# Patient Record
Sex: MALE | Race: WHITE | Employment: FULL TIME | URBAN - METROPOLITAN AREA
[De-identification: names, ages, dates, MRNs, and addresses within clinical notes are randomized per-mention and may not be internally consistent; named-entity substitution may affect disease eponyms.]

---

## 2017-03-20 ENCOUNTER — HOSPITAL ENCOUNTER (OUTPATIENT)
Dept: CT IMAGING | Age: 72
Discharge: HOME | End: 2017-03-20
Attending: UROLOGY
Payer: COMMERCIAL

## 2017-03-20 DIAGNOSIS — C67.9 BLADDER CARCINOMA (HCC): ICD-10-CM

## 2017-03-20 LAB — CREAT BLD-MCNC: 1 MG/DL (ref 0.6–1.3)

## 2017-03-20 PROCEDURE — 74178 CT ABD&PLV WO CNTR FLWD CNTR: CPT

## 2017-03-20 PROCEDURE — 82565 ASSAY OF CREATININE: CPT

## 2017-03-20 PROCEDURE — 74011250636 HC RX REV CODE- 250/636: Performed by: UROLOGY

## 2017-03-20 PROCEDURE — 74011636320 HC RX REV CODE- 636/320: Performed by: UROLOGY

## 2017-03-20 RX ORDER — SODIUM CHLORIDE 0.9 % (FLUSH) 0.9 %
10 SYRINGE (ML) INJECTION
Status: COMPLETED | OUTPATIENT
Start: 2017-03-20 | End: 2017-03-20

## 2017-03-20 RX ORDER — SODIUM CHLORIDE 9 MG/ML
50 INJECTION, SOLUTION INTRAVENOUS
Status: COMPLETED | OUTPATIENT
Start: 2017-03-20 | End: 2017-03-20

## 2017-03-20 RX ADMIN — SODIUM CHLORIDE 50 ML/HR: 900 INJECTION, SOLUTION INTRAVENOUS at 16:41

## 2017-03-20 RX ADMIN — Medication 10 ML: at 16:41

## 2017-03-20 RX ADMIN — IOPAMIDOL 100 ML: 612 INJECTION, SOLUTION INTRAVENOUS at 16:41

## 2017-05-10 ENCOUNTER — HOSPITAL ENCOUNTER (OUTPATIENT)
Dept: GENERAL RADIOLOGY | Age: 72
Discharge: HOME | End: 2017-05-10
Attending: UROLOGY
Payer: COMMERCIAL

## 2017-05-10 ENCOUNTER — HOSPITAL ENCOUNTER (OUTPATIENT)
Dept: PREADMISSION TESTING | Age: 72
Discharge: HOME | End: 2017-05-10
Payer: COMMERCIAL

## 2017-05-10 VITALS
SYSTOLIC BLOOD PRESSURE: 131 MMHG | WEIGHT: 200 LBS | DIASTOLIC BLOOD PRESSURE: 73 MMHG | HEIGHT: 68 IN | TEMPERATURE: 98 F | BODY MASS INDEX: 30.31 KG/M2 | HEART RATE: 59 BPM | OXYGEN SATURATION: 98 %

## 2017-05-10 LAB
ALBUMIN SERPL BCP-MCNC: 3.7 G/DL (ref 3.5–5)
ALBUMIN/GLOB SERPL: 1.1 {RATIO} (ref 1.1–2.2)
ALP SERPL-CCNC: 84 U/L (ref 45–117)
ALT SERPL-CCNC: 24 U/L (ref 12–78)
ANION GAP BLD CALC-SCNC: 5 MMOL/L (ref 5–15)
APPEARANCE UR: CLEAR
APTT PPP: 25.5 SEC (ref 22.1–32.5)
AST SERPL W P-5'-P-CCNC: 18 U/L (ref 15–37)
ATRIAL RATE: 63 BPM
BACTERIA URNS QL MICRO: NEGATIVE /HPF
BASOPHILS # BLD AUTO: 0 K/UL (ref 0–0.1)
BASOPHILS # BLD: 1 % (ref 0–1)
BILIRUB SERPL-MCNC: 0.6 MG/DL (ref 0.2–1)
BILIRUB UR QL: NEGATIVE
BUN SERPL-MCNC: 12 MG/DL (ref 6–20)
BUN/CREAT SERPL: 11 (ref 12–20)
CALCIUM SERPL-MCNC: 9 MG/DL (ref 8.5–10.1)
CALCULATED P AXIS, ECG09: 76 DEGREES
CALCULATED R AXIS, ECG10: 10 DEGREES
CALCULATED T AXIS, ECG11: 50 DEGREES
CHLORIDE SERPL-SCNC: 106 MMOL/L (ref 97–108)
CO2 SERPL-SCNC: 29 MMOL/L (ref 21–32)
COLOR UR: ABNORMAL
CREAT SERPL-MCNC: 1.07 MG/DL (ref 0.7–1.3)
DIAGNOSIS, 93000: NORMAL
EOSINOPHIL # BLD: 0.1 K/UL (ref 0–0.4)
EOSINOPHIL NFR BLD: 1 % (ref 0–7)
EPITH CASTS URNS QL MICRO: ABNORMAL /LPF
ERYTHROCYTE [DISTWIDTH] IN BLOOD BY AUTOMATED COUNT: 14.1 % (ref 11.5–14.5)
GLOBULIN SER CALC-MCNC: 3.4 G/DL (ref 2–4)
GLUCOSE SERPL-MCNC: 100 MG/DL (ref 65–100)
GLUCOSE UR STRIP.AUTO-MCNC: NEGATIVE MG/DL
HCT VFR BLD AUTO: 42.7 % (ref 36.6–50.3)
HGB BLD-MCNC: 14 G/DL (ref 12.1–17)
HGB UR QL STRIP: ABNORMAL
INR PPP: 1 (ref 0.9–1.1)
KETONES UR QL STRIP.AUTO: NEGATIVE MG/DL
LEUKOCYTE ESTERASE UR QL STRIP.AUTO: ABNORMAL
LYMPHOCYTES # BLD AUTO: 25 % (ref 12–49)
LYMPHOCYTES # BLD: 1.1 K/UL (ref 0.8–3.5)
MCH RBC QN AUTO: 28.7 PG (ref 26–34)
MCHC RBC AUTO-ENTMCNC: 32.8 G/DL (ref 30–36.5)
MCV RBC AUTO: 87.5 FL (ref 80–99)
MONOCYTES # BLD: 0.4 K/UL (ref 0–1)
MONOCYTES NFR BLD AUTO: 10 % (ref 5–13)
NEUTS SEG # BLD: 2.7 K/UL (ref 1.8–8)
NEUTS SEG NFR BLD AUTO: 63 % (ref 32–75)
NITRITE UR QL STRIP.AUTO: NEGATIVE
P-R INTERVAL, ECG05: 164 MS
PH UR STRIP: 6 [PH] (ref 5–8)
PLATELET # BLD AUTO: 196 K/UL (ref 150–400)
POTASSIUM SERPL-SCNC: 4.5 MMOL/L (ref 3.5–5.1)
PROT SERPL-MCNC: 7.1 G/DL (ref 6.4–8.2)
PROT UR STRIP-MCNC: NEGATIVE MG/DL
PROTHROMBIN TIME: 10.4 SEC (ref 9–11.1)
Q-T INTERVAL, ECG07: 412 MS
QRS DURATION, ECG06: 78 MS
QTC CALCULATION (BEZET), ECG08: 421 MS
RBC # BLD AUTO: 4.88 M/UL (ref 4.1–5.7)
RBC #/AREA URNS HPF: ABNORMAL /HPF (ref 0–5)
SODIUM SERPL-SCNC: 140 MMOL/L (ref 136–145)
SP GR UR REFRACTOMETRY: 1.02 (ref 1–1.03)
THERAPEUTIC RANGE,PTTT: NORMAL SECS (ref 58–77)
UROBILINOGEN UR QL STRIP.AUTO: 0.2 EU/DL (ref 0.2–1)
VENTRICULAR RATE, ECG03: 63 BPM
WBC # BLD AUTO: 4.2 K/UL (ref 4.1–11.1)
WBC URNS QL MICRO: ABNORMAL /HPF (ref 0–4)

## 2017-05-10 PROCEDURE — 71020 XR CHEST PA LAT: CPT

## 2017-05-10 PROCEDURE — 85610 PROTHROMBIN TIME: CPT | Performed by: UROLOGY

## 2017-05-10 PROCEDURE — 87086 URINE CULTURE/COLONY COUNT: CPT | Performed by: UROLOGY

## 2017-05-10 PROCEDURE — 81001 URINALYSIS AUTO W/SCOPE: CPT | Performed by: UROLOGY

## 2017-05-10 PROCEDURE — 85025 COMPLETE CBC W/AUTO DIFF WBC: CPT | Performed by: UROLOGY

## 2017-05-10 PROCEDURE — 93005 ELECTROCARDIOGRAM TRACING: CPT

## 2017-05-10 PROCEDURE — 36415 COLL VENOUS BLD VENIPUNCTURE: CPT | Performed by: UROLOGY

## 2017-05-10 PROCEDURE — 80053 COMPREHEN METABOLIC PANEL: CPT | Performed by: UROLOGY

## 2017-05-10 PROCEDURE — 85730 THROMBOPLASTIN TIME PARTIAL: CPT | Performed by: UROLOGY

## 2017-05-10 RX ORDER — TIMOLOL MALEATE 6.8 MG/ML
1 SOLUTION/ DROPS OPHTHALMIC DAILY
COMMUNITY

## 2017-05-11 LAB
BACTERIA SPEC CULT: NORMAL
CC UR VC: NORMAL
SERVICE CMNT-IMP: NORMAL

## 2017-05-20 ENCOUNTER — ANESTHESIA EVENT (OUTPATIENT)
Dept: SURGERY | Age: 72
End: 2017-05-20
Payer: COMMERCIAL

## 2017-05-22 ENCOUNTER — HOSPITAL ENCOUNTER (OUTPATIENT)
Age: 72
Setting detail: OBSERVATION
Discharge: HOME | End: 2017-05-23
Attending: UROLOGY | Admitting: UROLOGY
Payer: COMMERCIAL

## 2017-05-22 ENCOUNTER — ANESTHESIA (OUTPATIENT)
Dept: SURGERY | Age: 72
End: 2017-05-22
Payer: COMMERCIAL

## 2017-05-22 PROCEDURE — 74011000258 HC RX REV CODE- 258: Performed by: UROLOGY

## 2017-05-22 PROCEDURE — 99218 HC RM OBSERVATION: CPT

## 2017-05-22 PROCEDURE — 88305 TISSUE EXAM BY PATHOLOGIST: CPT | Performed by: UROLOGY

## 2017-05-22 PROCEDURE — 77030011640 HC PAD GRND REM COVD -A: Performed by: UROLOGY

## 2017-05-22 PROCEDURE — 74011250637 HC RX REV CODE- 250/637: Performed by: UROLOGY

## 2017-05-22 PROCEDURE — 74011000250 HC RX REV CODE- 250

## 2017-05-22 PROCEDURE — 77030008684 HC TU ET CUF COVD -B: Performed by: ANESTHESIOLOGY

## 2017-05-22 PROCEDURE — 77030013079 HC BLNKT BAIR HGGR 3M -A: Performed by: ANESTHESIOLOGY

## 2017-05-22 PROCEDURE — 77030012893

## 2017-05-22 PROCEDURE — 76010000149 HC OR TIME 1 TO 1.5 HR: Performed by: UROLOGY

## 2017-05-22 PROCEDURE — 77030018846 HC SOL IRR STRL H20 ICUM -A: Performed by: UROLOGY

## 2017-05-22 PROCEDURE — 77030018830 HC SOL IRR GLYC ICUM-A: Performed by: UROLOGY

## 2017-05-22 PROCEDURE — 74011250636 HC RX REV CODE- 250/636

## 2017-05-22 PROCEDURE — 77030032490 HC SLV COMPR SCD KNE COVD -B: Performed by: UROLOGY

## 2017-05-22 PROCEDURE — 76060000033 HC ANESTHESIA 1 TO 1.5 HR: Performed by: UROLOGY

## 2017-05-22 PROCEDURE — 77030021707 HC SET IRR FLD WRMR 3M -B: Performed by: UROLOGY

## 2017-05-22 PROCEDURE — 77030026438 HC STYL ET INTUB CARD -A: Performed by: ANESTHESIOLOGY

## 2017-05-22 PROCEDURE — 77030011274 HC ELECTRD CUT LP RWOL -F: Performed by: UROLOGY

## 2017-05-22 PROCEDURE — 76210000016 HC OR PH I REC 1 TO 1.5 HR: Performed by: UROLOGY

## 2017-05-22 RX ORDER — LATANOPROST 50 UG/ML
1 SOLUTION/ DROPS OPHTHALMIC
Status: DISCONTINUED | OUTPATIENT
Start: 2017-05-22 | End: 2017-05-23 | Stop reason: HOSPADM

## 2017-05-22 RX ORDER — MIDAZOLAM HYDROCHLORIDE 1 MG/ML
1 INJECTION, SOLUTION INTRAMUSCULAR; INTRAVENOUS AS NEEDED
Status: DISCONTINUED | OUTPATIENT
Start: 2017-05-22 | End: 2017-05-22 | Stop reason: HOSPADM

## 2017-05-22 RX ORDER — SODIUM CHLORIDE 9 MG/ML
25 INJECTION, SOLUTION INTRAVENOUS CONTINUOUS
Status: DISCONTINUED | OUTPATIENT
Start: 2017-05-22 | End: 2017-05-22 | Stop reason: HOSPADM

## 2017-05-22 RX ORDER — ROPIVACAINE HYDROCHLORIDE 5 MG/ML
30 INJECTION, SOLUTION EPIDURAL; INFILTRATION; PERINEURAL AS NEEDED
Status: DISCONTINUED | OUTPATIENT
Start: 2017-05-22 | End: 2017-05-22 | Stop reason: HOSPADM

## 2017-05-22 RX ORDER — FENTANYL CITRATE 50 UG/ML
25 INJECTION, SOLUTION INTRAMUSCULAR; INTRAVENOUS
Status: DISCONTINUED | OUTPATIENT
Start: 2017-05-22 | End: 2017-05-22 | Stop reason: HOSPADM

## 2017-05-22 RX ORDER — DIPHENHYDRAMINE HYDROCHLORIDE 50 MG/ML
12.5 INJECTION, SOLUTION INTRAMUSCULAR; INTRAVENOUS AS NEEDED
Status: DISCONTINUED | OUTPATIENT
Start: 2017-05-22 | End: 2017-05-22 | Stop reason: SDUPTHER

## 2017-05-22 RX ORDER — SODIUM CHLORIDE, SODIUM LACTATE, POTASSIUM CHLORIDE, CALCIUM CHLORIDE 600; 310; 30; 20 MG/100ML; MG/100ML; MG/100ML; MG/100ML
100 INJECTION, SOLUTION INTRAVENOUS CONTINUOUS
Status: DISCONTINUED | OUTPATIENT
Start: 2017-05-22 | End: 2017-05-22 | Stop reason: HOSPADM

## 2017-05-22 RX ORDER — ONDANSETRON 2 MG/ML
INJECTION INTRAMUSCULAR; INTRAVENOUS AS NEEDED
Status: DISCONTINUED | OUTPATIENT
Start: 2017-05-22 | End: 2017-05-22 | Stop reason: HOSPADM

## 2017-05-22 RX ORDER — SODIUM CHLORIDE 0.9 % (FLUSH) 0.9 %
5-10 SYRINGE (ML) INJECTION EVERY 8 HOURS
Status: DISCONTINUED | OUTPATIENT
Start: 2017-05-22 | End: 2017-05-23 | Stop reason: HOSPADM

## 2017-05-22 RX ORDER — HYDROCODONE BITARTRATE AND ACETAMINOPHEN 5; 325 MG/1; MG/1
1 TABLET ORAL
Status: DISCONTINUED | OUTPATIENT
Start: 2017-05-22 | End: 2017-05-23 | Stop reason: HOSPADM

## 2017-05-22 RX ORDER — SODIUM CHLORIDE, SODIUM LACTATE, POTASSIUM CHLORIDE, CALCIUM CHLORIDE 600; 310; 30; 20 MG/100ML; MG/100ML; MG/100ML; MG/100ML
INJECTION, SOLUTION INTRAVENOUS
Status: DISCONTINUED | OUTPATIENT
Start: 2017-05-22 | End: 2017-05-22 | Stop reason: HOSPADM

## 2017-05-22 RX ORDER — ONDANSETRON 2 MG/ML
4 INJECTION INTRAMUSCULAR; INTRAVENOUS AS NEEDED
Status: DISCONTINUED | OUTPATIENT
Start: 2017-05-22 | End: 2017-05-22 | Stop reason: SDUPTHER

## 2017-05-22 RX ORDER — FENTANYL CITRATE 50 UG/ML
INJECTION, SOLUTION INTRAMUSCULAR; INTRAVENOUS AS NEEDED
Status: DISCONTINUED | OUTPATIENT
Start: 2017-05-22 | End: 2017-05-22 | Stop reason: HOSPADM

## 2017-05-22 RX ORDER — BACITRACIN ZINC 500 UNIT/G
OINTMENT (GRAM) TOPICAL 3 TIMES DAILY
Status: DISCONTINUED | OUTPATIENT
Start: 2017-05-22 | End: 2017-05-23 | Stop reason: HOSPADM

## 2017-05-22 RX ORDER — SODIUM CHLORIDE 0.9 % (FLUSH) 0.9 %
5-10 SYRINGE (ML) INJECTION AS NEEDED
Status: DISCONTINUED | OUTPATIENT
Start: 2017-05-22 | End: 2017-05-22 | Stop reason: HOSPADM

## 2017-05-22 RX ORDER — DIPHENHYDRAMINE HYDROCHLORIDE 50 MG/ML
12.5 INJECTION, SOLUTION INTRAMUSCULAR; INTRAVENOUS AS NEEDED
Status: DISCONTINUED | OUTPATIENT
Start: 2017-05-22 | End: 2017-05-22 | Stop reason: HOSPADM

## 2017-05-22 RX ORDER — SODIUM CHLORIDE 0.9 % (FLUSH) 0.9 %
5-10 SYRINGE (ML) INJECTION AS NEEDED
Status: DISCONTINUED | OUTPATIENT
Start: 2017-05-22 | End: 2017-05-23 | Stop reason: HOSPADM

## 2017-05-22 RX ORDER — HYDROMORPHONE HYDROCHLORIDE 1 MG/ML
0.5 INJECTION, SOLUTION INTRAMUSCULAR; INTRAVENOUS; SUBCUTANEOUS
Status: DISCONTINUED | OUTPATIENT
Start: 2017-05-22 | End: 2017-05-22 | Stop reason: HOSPADM

## 2017-05-22 RX ORDER — NALOXONE HYDROCHLORIDE 0.4 MG/ML
0.4 INJECTION, SOLUTION INTRAMUSCULAR; INTRAVENOUS; SUBCUTANEOUS AS NEEDED
Status: DISCONTINUED | OUTPATIENT
Start: 2017-05-22 | End: 2017-05-23 | Stop reason: HOSPADM

## 2017-05-22 RX ORDER — LIDOCAINE HYDROCHLORIDE 20 MG/ML
INJECTION, SOLUTION EPIDURAL; INFILTRATION; INTRACAUDAL; PERINEURAL AS NEEDED
Status: DISCONTINUED | OUTPATIENT
Start: 2017-05-22 | End: 2017-05-22 | Stop reason: HOSPADM

## 2017-05-22 RX ORDER — SODIUM CHLORIDE 0.9 % (FLUSH) 0.9 %
5-10 SYRINGE (ML) INJECTION AS NEEDED
Status: DISCONTINUED | OUTPATIENT
Start: 2017-05-22 | End: 2017-05-22 | Stop reason: SDUPTHER

## 2017-05-22 RX ORDER — CEFAZOLIN SODIUM 1 G/3ML
INJECTION, POWDER, FOR SOLUTION INTRAMUSCULAR; INTRAVENOUS AS NEEDED
Status: DISCONTINUED | OUTPATIENT
Start: 2017-05-22 | End: 2017-05-22 | Stop reason: HOSPADM

## 2017-05-22 RX ORDER — FENTANYL CITRATE 50 UG/ML
50 INJECTION, SOLUTION INTRAMUSCULAR; INTRAVENOUS AS NEEDED
Status: DISCONTINUED | OUTPATIENT
Start: 2017-05-22 | End: 2017-05-22 | Stop reason: HOSPADM

## 2017-05-22 RX ORDER — LIDOCAINE HYDROCHLORIDE 10 MG/ML
0.1 INJECTION, SOLUTION EPIDURAL; INFILTRATION; INTRACAUDAL; PERINEURAL AS NEEDED
Status: DISCONTINUED | OUTPATIENT
Start: 2017-05-22 | End: 2017-05-22 | Stop reason: HOSPADM

## 2017-05-22 RX ORDER — SODIUM CHLORIDE, SODIUM LACTATE, POTASSIUM CHLORIDE, CALCIUM CHLORIDE 600; 310; 30; 20 MG/100ML; MG/100ML; MG/100ML; MG/100ML
100 INJECTION, SOLUTION INTRAVENOUS CONTINUOUS
Status: DISCONTINUED | OUTPATIENT
Start: 2017-05-22 | End: 2017-05-22 | Stop reason: SDUPTHER

## 2017-05-22 RX ORDER — SODIUM CHLORIDE 0.9 % (FLUSH) 0.9 %
5-10 SYRINGE (ML) INJECTION EVERY 8 HOURS
Status: DISCONTINUED | OUTPATIENT
Start: 2017-05-22 | End: 2017-05-22 | Stop reason: HOSPADM

## 2017-05-22 RX ORDER — FENTANYL CITRATE 50 UG/ML
25 INJECTION, SOLUTION INTRAMUSCULAR; INTRAVENOUS
Status: DISCONTINUED | OUTPATIENT
Start: 2017-05-22 | End: 2017-05-22 | Stop reason: SDUPTHER

## 2017-05-22 RX ORDER — OXYBUTYNIN CHLORIDE 5 MG/1
5 TABLET ORAL 3 TIMES DAILY
Status: DISCONTINUED | OUTPATIENT
Start: 2017-05-22 | End: 2017-05-23 | Stop reason: HOSPADM

## 2017-05-22 RX ORDER — MIDAZOLAM HYDROCHLORIDE 1 MG/ML
0.5 INJECTION, SOLUTION INTRAMUSCULAR; INTRAVENOUS
Status: DISCONTINUED | OUTPATIENT
Start: 2017-05-22 | End: 2017-05-22 | Stop reason: HOSPADM

## 2017-05-22 RX ORDER — SUCCINYLCHOLINE CHLORIDE 20 MG/ML
INJECTION INTRAMUSCULAR; INTRAVENOUS AS NEEDED
Status: DISCONTINUED | OUTPATIENT
Start: 2017-05-22 | End: 2017-05-22 | Stop reason: HOSPADM

## 2017-05-22 RX ORDER — DEXTROSE MONOHYDRATE AND SODIUM CHLORIDE 5; .45 G/100ML; G/100ML
75 INJECTION, SOLUTION INTRAVENOUS CONTINUOUS
Status: DISCONTINUED | OUTPATIENT
Start: 2017-05-22 | End: 2017-05-23 | Stop reason: HOSPADM

## 2017-05-22 RX ORDER — MIDAZOLAM HYDROCHLORIDE 1 MG/ML
0.5 INJECTION, SOLUTION INTRAMUSCULAR; INTRAVENOUS
Status: DISCONTINUED | OUTPATIENT
Start: 2017-05-22 | End: 2017-05-22 | Stop reason: SDUPTHER

## 2017-05-22 RX ORDER — TIMOLOL MALEATE 5 MG/ML
1 SOLUTION/ DROPS OPHTHALMIC DAILY
Status: DISCONTINUED | OUTPATIENT
Start: 2017-05-23 | End: 2017-05-23 | Stop reason: HOSPADM

## 2017-05-22 RX ORDER — ROCURONIUM BROMIDE 10 MG/ML
INJECTION, SOLUTION INTRAVENOUS AS NEEDED
Status: DISCONTINUED | OUTPATIENT
Start: 2017-05-22 | End: 2017-05-22 | Stop reason: HOSPADM

## 2017-05-22 RX ORDER — MORPHINE SULFATE 10 MG/ML
2 INJECTION, SOLUTION INTRAMUSCULAR; INTRAVENOUS
Status: DISCONTINUED | OUTPATIENT
Start: 2017-05-22 | End: 2017-05-22 | Stop reason: SDUPTHER

## 2017-05-22 RX ORDER — ONDANSETRON 2 MG/ML
4 INJECTION INTRAMUSCULAR; INTRAVENOUS AS NEEDED
Status: DISCONTINUED | OUTPATIENT
Start: 2017-05-22 | End: 2017-05-22 | Stop reason: HOSPADM

## 2017-05-22 RX ORDER — MORPHINE SULFATE 10 MG/ML
2 INJECTION, SOLUTION INTRAMUSCULAR; INTRAVENOUS
Status: DISCONTINUED | OUTPATIENT
Start: 2017-05-22 | End: 2017-05-22 | Stop reason: HOSPADM

## 2017-05-22 RX ORDER — CEFAZOLIN SODIUM IN 0.9 % NACL 2 G/50 ML
2 INTRAVENOUS SOLUTION, PIGGYBACK (ML) INTRAVENOUS
Status: DISCONTINUED | OUTPATIENT
Start: 2017-05-22 | End: 2017-05-22 | Stop reason: HOSPADM

## 2017-05-22 RX ORDER — PROPOFOL 10 MG/ML
INJECTION, EMULSION INTRAVENOUS AS NEEDED
Status: DISCONTINUED | OUTPATIENT
Start: 2017-05-22 | End: 2017-05-22 | Stop reason: HOSPADM

## 2017-05-22 RX ORDER — HYDROMORPHONE HYDROCHLORIDE 1 MG/ML
0.5 INJECTION, SOLUTION INTRAMUSCULAR; INTRAVENOUS; SUBCUTANEOUS
Status: DISCONTINUED | OUTPATIENT
Start: 2017-05-22 | End: 2017-05-22 | Stop reason: SDUPTHER

## 2017-05-22 RX ADMIN — SUCCINYLCHOLINE CHLORIDE 140 MG: 20 INJECTION INTRAMUSCULAR; INTRAVENOUS at 10:38

## 2017-05-22 RX ADMIN — FENTANYL CITRATE 50 MCG: 50 INJECTION, SOLUTION INTRAMUSCULAR; INTRAVENOUS at 10:22

## 2017-05-22 RX ADMIN — DEXTROSE MONOHYDRATE AND SODIUM CHLORIDE 75 ML/HR: 5; .45 INJECTION, SOLUTION INTRAVENOUS at 19:00

## 2017-05-22 RX ADMIN — PROPOFOL 20 MG: 10 INJECTION, EMULSION INTRAVENOUS at 10:28

## 2017-05-22 RX ADMIN — PROPOFOL 20 MG: 10 INJECTION, EMULSION INTRAVENOUS at 10:22

## 2017-05-22 RX ADMIN — PROPOFOL 150 MG: 10 INJECTION, EMULSION INTRAVENOUS at 10:38

## 2017-05-22 RX ADMIN — OXYBUTYNIN CHLORIDE 5 MG: 5 TABLET ORAL at 21:50

## 2017-05-22 RX ADMIN — Medication: at 21:47

## 2017-05-22 RX ADMIN — CEFAZOLIN SODIUM 2 G: 1 INJECTION, POWDER, FOR SOLUTION INTRAMUSCULAR; INTRAVENOUS at 10:40

## 2017-05-22 RX ADMIN — ONDANSETRON 4 MG: 2 INJECTION INTRAMUSCULAR; INTRAVENOUS at 11:25

## 2017-05-22 RX ADMIN — OXYBUTYNIN CHLORIDE 5 MG: 5 TABLET ORAL at 19:00

## 2017-05-22 RX ADMIN — Medication: at 18:00

## 2017-05-22 RX ADMIN — FENTANYL CITRATE 50 MCG: 50 INJECTION, SOLUTION INTRAMUSCULAR; INTRAVENOUS at 10:38

## 2017-05-22 RX ADMIN — Medication 10 ML: at 21:49

## 2017-05-22 RX ADMIN — LIDOCAINE HYDROCHLORIDE 80 MG: 20 INJECTION, SOLUTION EPIDURAL; INFILTRATION; INTRACAUDAL; PERINEURAL at 10:38

## 2017-05-22 RX ADMIN — SODIUM CHLORIDE, SODIUM LACTATE, POTASSIUM CHLORIDE, CALCIUM CHLORIDE: 600; 310; 30; 20 INJECTION, SOLUTION INTRAVENOUS at 10:00

## 2017-05-22 RX ADMIN — ROCURONIUM BROMIDE 10 MG: 10 INJECTION, SOLUTION INTRAVENOUS at 10:38

## 2017-05-22 NOTE — PERIOP NOTES
Patient: Deannie Meckel. MRN: [id number]  SSN: [id number]   YOB: 1945  Age: 70 y.o. Sex: male     Patient is status post Procedure(s):  Transurethral Resection of Large Bladder Tumor      .     Surgeon(s) and Role:     * Toni Mccracken MD - Primary    Local/Dose/Irrigation: None                                         Dressing/Packing:     Splint/Cast:  ]    Other:

## 2017-05-22 NOTE — IP AVS SNAPSHOT
[address] 
[phone] Patient: Avni Whyte. MRN: [id number] ABI:1/[birth year]15 You are allergic to the following Allergen Reactions Nsaids (Non-Steroidal Anti-Inflammatory Drug) Other (comments) As per physcian order due to glaucoma and CKD Recent Documentation Height Weight BMI Smoking Status 1.727 m 90.7 kg 30.41 kg/m2 Never Smoker Emergency Contacts Name Discharge Info Relation Home Work Mobile RuiMilagros hamlin DISCHARGE CAREGIVER [3] Spouse [3] [phone] About your hospitalization You were admitted on:  May 22, 2017 You last received care in the:  [place] [address] You were discharged on:  May 23, 2017 Unit phone number:  [phone] Why you were hospitalized Your primary diagnosis was:  Not on File Providers Seen During Your Hospitalizations Provider Role Specialty Primary office phone Debora Stout MD Attending Provider Urology [phone] Your Primary Care Physician (PCP) Primary Care Physician Office Phone Office Fax Kermit Donnelly [phone] Follow-up Information Follow up With Details Comments Contact Info Stefan Jovel MD Go on 5/30/2017 For hospital discharge follow-up appointment at 11:15 AM. [address] [place] [address] 
[address] [facility] [address] 
[phone] Your Appointments Tuesday May 30, 2017 11:15 AM EDT ACUTE CARE with Stefan Jovel MD  
[address] [id number] [phone] Current Discharge Medication List  
  
START taking these medications Dose & Instructions Dispensing Information Comments Morning Noon Evening Bedtime  
 cephALEXin 500 mg capsule Commonly known as:  Baldev Haney Your last dose was: Your next dose is:    
   
   
 Dose:  500 mg Take 1 Cap by mouth four (4) times daily for 5 days. Quantity:  20 Cap Refills:  0 HYDROcodone-acetaminophen 7.5-325 mg per tablet Commonly known as:  Madhuri Staple Your last dose was: Your next dose is:    
   
   
 Dose:  1 Tab Take 1 Tab by mouth every six (6) hours as needed for Pain. Max Daily Amount: 4 Tabs. Quantity:  20 Tab Refills:  o CONTINUE these medications which have NOT CHANGED Dose & Instructions Dispensing Information Comments Morning Noon Evening Bedtime ISTALOL 0.5 % Drpd ophthalmic solution Generic drug:  timolol maleate Your last dose was: Your next dose is:    
   
   
 Dose:  1 Drop Administer 1 Drop to right eye daily. Refills:  0 MULTIPLE VITAMIN PO Your last dose was: Your next dose is:    
   
   
 Dose:  1 Tab Take 1 Tab by mouth daily. Refills:  0  
     
   
   
   
  
 simvastatin 20 mg tablet Commonly known as:  ZOCOR Your last dose was: Your next dose is: TAKE 1 TABLET BY MOUTH AT BEDTIME Quantity:  90 Tab Refills:  3 TRAVATAN Z 0.004 % ophthalmic solution Generic drug:  travoprost  
   
Your last dose was: Your next dose is:    
   
   
 Dose:  1 Drop Administer 1 Drop to both eyes every evening. Refills:  0  
     
   
   
   
  
 VITAMIN D3 1,000 unit tablet Generic drug:  cholecalciferol Your last dose was: Your next dose is:    
   
   
 Dose:  2000 Units Take 2,000 Units by mouth daily. Refills:  0 ZyrTEC 10 mg tablet Generic drug:  cetirizine Your last dose was: Your next dose is:    
   
   
 Dose:  10 mg Take 10 mg by mouth daily. Refills:  0 STOP taking these medications aspirin delayed-release 81 mg tablet Where to Get Your Medications Information on where to get these meds will be given to you by the nurse or doctor. ! Ask your nurse or doctor about these medications  
  cephALEXin 500 mg capsule HYDROcodone-acetaminophen 7.5-325 mg per tablet Discharge Instructions Patient Discharge Instructions Michelle Pedro. / [id number] : 1945 Admitted 2017 Discharged: 2017 Take Home Medications · It is important that you take the medication exactly as they are prescribed. · Keep your medication in the bottles provided by the pharmacist and keep a list of the medication names, dosages, and times to be taken in your wallet. · Do not take other medications without consulting your doctor. What to do at [facility] Recommended activity: No Lifting for 6 weeks. Follow-up with [facility]. Call for an appointment (if not already scheduled)            [phone]. Information obtained by : 
I understand that if any problems occur once I am at home I am to contact my physician. I understand and acknowledge receipt of the instructions indicated above. Physician's or R.N.'s Signature                                                                  Date/Time Patient or Representative Signature                                                          Date/Time DISCHARGE SUMMARY from Nurse The following personal items are in your possession at time of discharge: 
 
Dental Appliances: None Visual Aid: Glasses Home Medications: None Jewelry: None Clothing: At bedside Other Valuables: None PATIENT INSTRUCTIONS: 
 
After general anesthesia or intravenous sedation, for 24 hours or while taking prescription Narcotics: · Limit your activities · Do not drive and operate hazardous machinery · Do not make important personal or business decisions · Do  not drink alcoholic beverages · If you have not urinated within 8 hours after discharge, please contact your surgeon on call. Report the following to your surgeon: 
· Excessive pain, swelling, redness or odor of or around the surgical area · Temperature over 100.5 · Nausea and vomiting lasting longer than 4 hours or if unable to take medications · Any signs of decreased circulation or nerve impairment to extremity: change in color, persistent  numbness, tingling, coldness or increase pain · Any questions These are general instructions for a healthy lifestyle: No smoking/ No tobacco products/ Avoid exposure to second hand smoke Surgeon General's Warning:  Quitting smoking now greatly reduces serious risk to your health. Obesity, smoking, and sedentary lifestyle greatly increases your risk for illness A healthy diet, regular physical exercise & weight monitoring are important for maintaining a healthy lifestyle You may be retaining fluid if you have a history of heart failure or if you experience any of the following symptoms:  Weight gain of 3 pounds or more overnight or 5 pounds in a week, increased swelling in our hands or feet or shortness of breath while lying flat in bed. Please call your doctor as soon as you notice any of these symptoms; do not wait until your next office visit. Recognize signs and symptoms of STROKE: 
 
F-face looks uneven A-arms unable to move or move unevenly S-speech slurred or non-existent T-time-call 911 as soon as signs and symptoms begin-DO NOT go Back to bed or wait to see if you get better-TIME IS BRAIN.  
 
Warning Signs of HEART ATTACK  
 
 Call 911 if you have these symptoms: 
? Chest discomfort. Most heart attacks involve discomfort in the center of the chest that lasts more than a few minutes, or that goes away and comes back. It can feel like uncomfortable pressure, squeezing, fullness, or pain. ? Discomfort in other areas of the upper body. Symptoms can include pain or discomfort in one or both arms, the back, neck, jaw, or stomach. ? Shortness of breath with or without chest discomfort. ? Other signs may include breaking out in a cold sweat, nausea, or lightheadedness. Don't wait more than five minutes to call [address]! Fast action can save your life. Calling 911 is almost always the fastest way to get lifesaving treatment. Emergency Medical Services staff can begin treatment when they arrive  up to an hour sooner than if someone gets to the hospital by car. The discharge information has been reviewed with the patient. The patient verbalized understanding. Discharge medications reviewed with the patient and appropriate educational materials and side effects teaching were provided. Discharge Orders None Introducing [facility] SERVICES! [facility] introduces Intrallect patient portal. Now you can access parts of your medical record, email your doctor's office, and request medication refills online. 1. In your internet browser, go to [email or web address] 2. Click on the First Time User? Click Here link in the Sign In box. You will see the New Member Sign Up page. 3. Enter your Intrallect Access Code exactly as it appears below. You will not need to use this code after youve completed the sign-up process. If you do not sign up before the expiration date, you must request a new code. · Intrallect Access Code: [email or web address] Expires: 6/13/2017  3:46 PM 
 
4.  Enter the last four digits of your Social Security Number (xxxx) and Date of Birth (mm/dd/yyyy) as indicated and click Submit. You will be taken to the next sign-up page. 5. Create a [email or web address] ID. This will be your [email or web address] login ID and cannot be changed, so think of one that is secure and easy to remember. 6. Create a [email or web address] password. You can change your password at any time. 7. Enter your Password Reset Question and Answer. This can be used at a later time if you forget your password. 8. Enter your e-mail address. You will receive e-mail notification when new information is available in [address] 9. Click Sign Up. You can now view and download portions of your medical record. 10. Click the Download Summary menu link to download a portable copy of your medical information. If you have questions, please visit the Frequently Asked Questions section of the [email or web address] website. Remember, [email or web address] is NOT to be used for urgent needs. For medical emergencies, dial 911. Now available from your iPhone and Android! General Information Please provide this summary of care documentation to your next provider. Patient Signature:  ____________________________________________________________ Date:  ____________________________________________________________  
  
Nguyễn Rell Provider Signature:  ____________________________________________________________ Date:  ____________________________________________________________

## 2017-05-22 NOTE — PERIOP NOTES
Called to the Surgical Waiting and spoke with the patient's wife to let them know that we had started the procedure at [phone]. Also that we would update them as needed.

## 2017-05-22 NOTE — BRIEF OP NOTE
BRIEF OPERATIVE NOTE    Date of Procedure: 5/22/2017   Preoperative Diagnosis: BLADDER CA  Postoperative Diagnosis: BLADDER CA    Procedure(s):  Transurethral Resection of Large Bladder Tumor        Surgeon(s) and Role:     * Brittney Liu MD - Primary         Assistant Staff:       Surgical Staff:  Circ-1: Pavan Perez, RN  Scrub RN-1: Haydee Segovia RN  Event Time In   Incision Start 1040   Incision Close 1115     Anesthesia: General   Estimated Blood Loss: 100  Specimens:   ID Type Source Tests Collected by Time Destination   1 : Dome Lesion Preservative Bladder  Brittney Liu MD 5/22/2017 1046 Pathology   2 : Resection of [facility] Preservative Bladder  Brittney Liu MD 5/22/2017 1059 Pathology   3 : Left Lateral Wall Preservative Bladder  Brittney Liu MD 5/22/2017 1110 Pathology   4 : Floor of BLadder Preservative Bladder  Brittney Liu MD 5/22/2017 1110 Pathology   5 : Right Floor of Bladder Preservative Bladder  Brittney Liu MD 5/22/2017 1111 Pathology   6 : Right Posterior Wall Preservative Bladder  Brittney Liu MD 5/22/2017 1111 Pathology   7 : Prostatic Urethra @ 5 'o clock Allison Pritchard MD 5/22/2017 1112 Pathology      Findings: multiple tumors bladder.   large   Complications: 0  Implants: * No implants in log *

## 2017-05-22 NOTE — OP NOTES
[address] [facility] [address], [address]   OP NOTE       Name:  Aidee Yap   MR#:  [id number]   :  1945   Account #:  [de-identified]    Surgery Date:  2017   Date of Adm:  2017       PREOPERATIVE DIAGNOSIS: Transitional cell carcinoma of the   bladder. POSTOPERATIVE DIAGNOSIS: Transitional cell carcinoma of the   bladder. PROCEDURES PERFORMED: Repeat Transurethral resection of   large bladder tumor. COMPLICATIONS: None. SPECIMENS REMOVED: Bladder lesion labeled by site, prostatic   urethra. COMPLICATIONS: None. ESTIMATED BLOOD LOSS: 100 mL. ANESTHESIA: General.      DESCRIPTION OF PROCEDURE: After anesthesia, the patient was   prepped and draped in lithotomy. The 21 cystoscope was inserted and   the bladder was carefully examined. There was erythema and   irregularity involving the prostatic urethra. There were reddened   irregular appearing areas in the posterior wall floor, right floor   dome, and left lateral wall. The largest was the lesion in the dome,   which had some papillary configuration, some irregularity, all of these   were associated with some necrosis related to previous transurethral   resection and previous intravesical BCG. Using the rigid cold cup   biopsy forceps, some samples were taken from the dome lesion and   then the resectoscope was inserted and the areas in question were all   resected deeply in to perivesical fat in some areas and muscularis   propria. Hemostasis was obtained with electrocautery. A large sample   of the prostatic urethra was then taken using pure cutting current in the   5 o'clock position and this was associated with a significant amount of   bleeding, which was then controlled using electrocautery. All the   pieces were irrigated and submitted based on their anatomic location. Good quality hemostasis was obtained, and a 24-Nauruan Gamble was   inserted and irrigation which returned clear. The patient was then   reacted from anesthesia and transferred to recovery in stable   condition.         MD Emanuel Mai / Reggie.Osvaldo   D:  05/22/2017   11:38   T:  05/22/2017   12:04   Job #:  [id number]

## 2017-05-22 NOTE — PERIOP NOTES
TRANSFER - OUT REPORT:    Verbal report given to Yuliya(name) on [employer].  being transferred to [facility](unit) for routine post - op       Report consisted of patients Situation, Background, Assessment and   Recommendations(SBAR). Time Pre op antibiotic given:1040  Anesthesia Stop time: 3265  Gamble Present on Transfer to floor:yes  Order for Gamble on Chart:yes    Information from the following report(s) SBAR, Kardex, OR Summary, Procedure Summary, Intake/Output, MAR, Recent Results and Med Rec Status was reviewed with the receiving nurse. Opportunity for questions and clarification was provided. Is the patient on 02?  no       L/Min        Other     Is the patient on a monitor? NO    Is the nurse transporting with the patient? NO    Surgical Waiting Area notified of patient's transfer from PACU? YES      The following personal items collected during your admission accompanied patient upon transfer:   Dental Appliance:    Vision: Visual Aid: Glasses  Hearing Aid:    Jewelry:    Clothing:    Other Valuables:    Valuables sent to safe:        Glasses on patient. Clothes sent to floor.

## 2017-05-22 NOTE — IP AVS SNAPSHOT
Current Discharge Medication List  
  
START taking these medications Dose & Instructions Dispensing Information Comments Morning Noon Evening Bedtime  
 cephALEXin 500 mg capsule Commonly known as:  Salimacorazon Reza Your last dose was: Your next dose is:    
   
   
 Dose:  500 mg Take 1 Cap by mouth four (4) times daily for 5 days. Quantity:  20 Cap Refills:  0 HYDROcodone-acetaminophen 7.5-325 mg per tablet Commonly known as:  Susana Elsa Your last dose was: Your next dose is:    
   
   
 Dose:  1 Tab Take 1 Tab by mouth every six (6) hours as needed for Pain. Max Daily Amount: 4 Tabs. Quantity:  20 Tab Refills:  o CONTINUE these medications which have NOT CHANGED Dose & Instructions Dispensing Information Comments Morning Noon Evening Bedtime ISTALOL 0.5 % Drpd ophthalmic solution Generic drug:  timolol maleate Your last dose was: Your next dose is:    
   
   
 Dose:  1 Drop Administer 1 Drop to right eye daily. Refills:  0 MULTIPLE VITAMIN PO Your last dose was: Your next dose is:    
   
   
 Dose:  1 Tab Take 1 Tab by mouth daily. Refills:  0  
     
   
   
   
  
 simvastatin 20 mg tablet Commonly known as:  ZOCOR Your last dose was: Your next dose is: TAKE 1 TABLET BY MOUTH AT BEDTIME Quantity:  90 Tab Refills:  3 TRAVATAN Z 0.004 % ophthalmic solution Generic drug:  travoprost  
   
Your last dose was: Your next dose is:    
   
   
 Dose:  1 Drop Administer 1 Drop to both eyes every evening. Refills:  0  
     
   
   
   
  
 VITAMIN D3 1,000 unit tablet Generic drug:  cholecalciferol Your last dose was: Your next dose is:    
   
   
 Dose:  2000 Units Take 2,000 Units by mouth daily. Refills:  0 ZyrTEC 10 mg tablet Generic drug:  cetirizine Your last dose was: Your next dose is:    
   
   
 Dose:  10 mg Take 10 mg by mouth daily. Refills:  0 STOP taking these medications   
 aspirin delayed-release 81 mg tablet Where to Get Your Medications Information on where to get these meds will be given to you by the nurse or doctor. ! Ask your nurse or doctor about these medications  
  cephALEXin 500 mg capsule HYDROcodone-acetaminophen 7.5-325 mg per tablet

## 2017-05-22 NOTE — H&P
Urology History and Physical    Patient: Claudell Mohair. 70 y.o. male     Referring Physician:  No ref. provider found    Chief Complaint: No chief complaint on file. History of Present Illness: tcc bladder for repeat tur see office notes    History:    Past Medical History:   Diagnosis Date    Allergic rhinitis 1/30/14    [facility] at [facility]    Arthritis     HANDS    Bladder cancer ([facility] [address] 06/02/2016    Dr. Mica James 8/10/16 f/u note path report too    Chronic kidney disease     Chronic kidney disease, stage II (mild)     Diverticula of colon 7/18/12    mia monterroso    Diverticulosis of colon     Elevated PSA 10/2011    Dr Mica James    Glaucoma     Hypercholesterolemia     near syncope 10/06/15    notes from [place] [address] Other ill-defined conditions     in clinical trial at [place] [address] for prostate cancer    Pneumonia     [facility]2012      6/3/16 [facility] notes  [facility] 8/10/16 note    Prostatitis     Lud[place]    Prostatitis 07/2014    dionteeman    Tinea cruris 3/09    and rosacea    Varicose veins     Vitamin D deficiency      Family History   Problem Relation Age of Onset    Heart Disease Mother     Cancer Mother      lung    Asthma Mother     Diabetes Father     Thyroid Disease Sister     Cancer Sister 61     breast and throat    Stroke Paternal Grandfather     MS Sister     Cancer Sister      THYROID    Anesth Problems Neg Hx      Social History     Social History    Marital status:      Spouse name: N/A    Number of children: N/A    Years of education: N/A     Occupational History    Not on file. Social History Main Topics    Smoking status: Never Smoker    Smokeless tobacco: Never Used    Alcohol use No    Drug use: No    Sexual activity: Yes     Partners: Female     Other Topics Concern    Not on file     Social History Narrative       Allergies:    Allergies   Allergen Reactions    Nsaids (Non-Steroidal Anti-Inflammatory Drug) Other (comments)     As per physcian order due to glaucoma and CKD       Current Medications:  Current Facility-Administered Medications   Medication Dose Route Frequency    ceFAZolin in 0.9% NS (ANCEF) IVPB soln 2 g  2 g IntraVENous ON CALL TO OR    lactated ringers infusion  100 mL/hr IntraVENous CONTINUOUS    0.9% sodium chloride infusion  25 mL/hr IntraVENous CONTINUOUS    sodium chloride (NS) flush 5-10 mL  5-10 mL IntraVENous Q8H    sodium chloride (NS) flush 5-10 mL  5-10 mL IntraVENous PRN    lidocaine (PF) (XYLOCAINE) 10 mg/mL (1 %) injection 0.1 mL  0.1 mL SubCUTAneous PRN    fentaNYL citrate (PF) injection 50 mcg  50 mcg IntraVENous PRN    midazolam (VERSED) injection 1 mg  1 mg IntraVENous PRN    midazolam (VERSED) injection 1 mg  1 mg IntraVENous PRN    ropivacaine (PF) (NAROPIN) 5 mg/mL (0.5 %) injection 150 mg  30 mL Intra artICUlar PRN    lactated ringers infusion  100 mL/hr IntraVENous CONTINUOUS    0.9% sodium chloride infusion  25 mL/hr IntraVENous CONTINUOUS    sodium chloride (NS) flush 5-10 mL  5-10 mL IntraVENous Q8H    sodium chloride (NS) flush 5-10 mL  5-10 mL IntraVENous PRN    lidocaine (PF) (XYLOCAINE) 10 mg/mL (1 %) injection 0.1 mL  0.1 mL SubCUTAneous PRN    fentaNYL citrate (PF) injection 50 mcg  50 mcg IntraVENous PRN    midazolam (VERSED) injection 1 mg  1 mg IntraVENous PRN    midazolam (VERSED) injection 1 mg  1 mg IntraVENous PRN    ropivacaine (PF) (NAROPIN) 5 mg/mL (0.5 %) injection 150 mg  30 mL Intra artICUlar PRN        Physical Exam:  Blood pressure 135/67, pulse 64, temperature 98.3 °F (36.8 °C), resp. rate 16, height 5' 8\" (1.727 m), weight 90.7 kg (200 lb), SpO2 (!) 64 %. GENERAL: alert, cooperative, no distress, appears stated age, LUNG: clear to auscultation bilaterally, HEART: regular rate and rhythm, S1, S2 normal, no murmur, click, rub or gallop, ABDOMEN: soft, non-tender.  Bowel sounds normal. No masses,  no organomegaly, EXTREMITIES:  extremities normal, atraumatic, no cyanosis or edema    Findings/Diagnosis: tcc bladder    Alerts:    Hospital Problems  Date Reviewed: 8/26/2016    None          Laboratory:    No results for input(s): HGB, HCT, WBC, PLT, INR, BUN, CREA, K, CRCLT, HGBEXT, HCTEXT, PLTEXT in the last 72 hours. No lab exists for component: PTT, PT, INREXT      Plan of Care/Planned Procedure:  Risks, benefits, and alternatives reviewed with patient and he agrees to proceed with the procedure. Full dictated report to follow.

## 2017-05-22 NOTE — ANESTHESIA PREPROCEDURE EVALUATION
Anesthetic History   No history of anesthetic complications            Review of Systems / Medical History  Patient summary reviewed, nursing notes reviewed and pertinent labs reviewed    Pulmonary  Within defined limits                 Neuro/Psych   Within defined limits           Cardiovascular  Within defined limits                Exercise tolerance: >4 METS     GI/Hepatic/Renal  Within defined limits       Renal disease: CRI       Endo/Other  Within defined limits      Arthritis and cancer     Other Findings              Physical Exam    Airway  Mallampati: II  TM Distance: 4 - 6 cm  Neck ROM: normal range of motion   Mouth opening: Normal     Cardiovascular  Regular rate and rhythm,  S1 and S2 normal,  no murmur, click, rub, or gallop             Dental  No notable dental hx       Pulmonary  Breath sounds clear to auscultation               Abdominal  GI exam deferred       Other Findings            Anesthetic Plan    ASA: 2  Anesthesia type: general          Induction: Intravenous  Anesthetic plan and risks discussed with: Patient

## 2017-05-22 NOTE — ANESTHESIA POSTPROCEDURE EVALUATION
Post-Anesthesia Evaluation and Assessment    Patient: Tayla Ta. MRN: [id number]  SSN: [id number]    YOB: 1945  Age: 70 y.o. Sex: male       Cardiovascular Function/Vital Signs  Visit Vitals    /72 (BP 1 Location: Left arm, BP Patient Position: At rest;Supine)    Pulse 72    Temp 36.5 °C (97.7 °F)    Resp 9    Ht 5' 8\" (1.727 m)    Wt 90.7 kg (200 lb)    SpO2 99%    BMI 30.41 kg/m2       Patient is status post general anesthesia for Procedure(s):  Transurethral Resection of Large Bladder Tumor      . Nausea/Vomiting: None    Postoperative hydration reviewed and adequate. Pain:  Pain Scale 1: Numeric (0 - 10) (05/22/17 1138)  Pain Intensity 1: 0 (05/22/17 1138)   Managed    Neurological Status:   Neuro (WDL): Within Defined Limits (05/22/17 1138)   At baseline    Mental Status and Level of Consciousness: Arousable    Pulmonary Status:   O2 Device: Room air (05/22/17 1215)   Adequate oxygenation and airway patent    Complications related to anesthesia: None    Post-anesthesia assessment completed.  No concerns    Signed By: Mau Mccoy MD     May 22, 2017

## 2017-05-23 VITALS
RESPIRATION RATE: 18 BRPM | DIASTOLIC BLOOD PRESSURE: 70 MMHG | SYSTOLIC BLOOD PRESSURE: 133 MMHG | WEIGHT: 200 LBS | BODY MASS INDEX: 30.31 KG/M2 | HEART RATE: 69 BPM | OXYGEN SATURATION: 94 % | HEIGHT: 68 IN | TEMPERATURE: 98.4 F

## 2017-05-23 LAB
ANION GAP BLD CALC-SCNC: 7 MMOL/L (ref 5–15)
BUN SERPL-MCNC: 8 MG/DL (ref 6–20)
BUN/CREAT SERPL: 8 (ref 12–20)
CALCIUM SERPL-MCNC: 8.4 MG/DL (ref 8.5–10.1)
CHLORIDE SERPL-SCNC: 106 MMOL/L (ref 97–108)
CO2 SERPL-SCNC: 27 MMOL/L (ref 21–32)
CREAT SERPL-MCNC: 1.04 MG/DL (ref 0.7–1.3)
GLUCOSE SERPL-MCNC: 112 MG/DL (ref 65–100)
HGB BLD-MCNC: 14.4 G/DL (ref 12.1–17)
POTASSIUM SERPL-SCNC: 3.9 MMOL/L (ref 3.5–5.1)
SODIUM SERPL-SCNC: 140 MMOL/L (ref 136–145)

## 2017-05-23 PROCEDURE — 77010033678 HC OXYGEN DAILY

## 2017-05-23 PROCEDURE — 99218 HC RM OBSERVATION: CPT

## 2017-05-23 PROCEDURE — 77030010545

## 2017-05-23 PROCEDURE — 80048 BASIC METABOLIC PNL TOTAL CA: CPT | Performed by: UROLOGY

## 2017-05-23 PROCEDURE — 77030018836 HC SOL IRR NACL ICUM -A

## 2017-05-23 PROCEDURE — 74011250637 HC RX REV CODE- 250/637: Performed by: UROLOGY

## 2017-05-23 PROCEDURE — 85018 HEMOGLOBIN: CPT | Performed by: UROLOGY

## 2017-05-23 PROCEDURE — 36415 COLL VENOUS BLD VENIPUNCTURE: CPT | Performed by: UROLOGY

## 2017-05-23 RX ORDER — CEPHALEXIN 500 MG/1
500 CAPSULE ORAL 4 TIMES DAILY
Qty: 20 CAP | Refills: 0 | Status: SHIPPED | OUTPATIENT
Start: 2017-05-23 | End: 2017-05-28

## 2017-05-23 RX ORDER — HYDROCODONE BITARTRATE AND ACETAMINOPHEN 7.5; 325 MG/1; MG/1
1 TABLET ORAL
Qty: 20 TAB | Status: SHIPPED | OUTPATIENT
Start: 2017-05-23 | End: 2017-06-14 | Stop reason: ALTCHOICE

## 2017-05-23 RX ADMIN — OXYBUTYNIN CHLORIDE 5 MG: 5 TABLET ORAL at 09:28

## 2017-05-23 RX ADMIN — Medication 10 ML: at 05:10

## 2017-05-23 NOTE — PROGRESS NOTES
Chart reviewed. Pt has past medical history of arthritis, bladder CA, CKD, diverticula of colon, glaucoma, hypercholesterolemia, pneumonia, prostate CA. Pt is POD1 of a repeat transurethral resection of large bladder tumor by Dr. Yefri Ragsdale. CM met with pt to introduce role of CM and discuss discharge needs. Pt lives with his wife [place] in a private residence in [place]. Pt is independent with ADLs and uses no DME. Confirmed insurance is [facility]. Pt gets medications at [facility] at [facility] [employer], [facility] [employer]. Confirmed insurance is [facility]. PCP is Alfredo Allan MD [phone]) with [facility] who pt stated he last saw in July. CM will schedule hospital discharge follow-up appointment with PCP. CM provided pt with observation notice. Obtained signature and placed on pt hard chart. Opportunity for questions and clarification provided. Wife will transport pt home via car at discharge. CM will be available if needs arise. Cm scheduled follow-up appointment with PCP for Tuesday, May 30th at 11:15 AM.    Care Management Interventions  PCP Verified by CM: Yes Alfredo Allan MD)  Mode of Transport at Discharge:  Other (see comment) (family)  Transition of Care Consult (CM Consult): Discharge Planning  MyChart Signup: No  Discharge Durable Medical Equipment: No  Physical Therapy Consult: No  Occupational Therapy Consult: No  Speech Therapy Consult: No  Current Support Network: Lives with Spouse, Own Home  Confirm Follow Up Transport: Family  Plan discussed with Pt/Family/Caregiver: Yes  Discharge Location  Discharge Placement: Home     MOJGAN Alvarez/EUFEMIA

## 2017-05-23 NOTE — PROGRESS NOTES
1203 Patient and family educated with parks teaching, discharge instructions, Rx. Patient and wife verbalized understanding with adequate teach back. Patient signed a copy of the discharge instructions that were then placed on the hard chart.

## 2017-05-23 NOTE — PROGRESS NOTES
Problem: Discharge Planning  Goal: *Discharge to safe environment  Outcome: Progressing Towards Goal  Discharge disposition: Home.      MOJGAN Vidal/EUFEMIA

## 2017-05-23 NOTE — DISCHARGE INSTRUCTIONS
Patient Discharge Instructions    Winsome Shyann / [id number] : 1945    Admitted 2017 Discharged: 2017     Take Home Medications       · It is important that you take the medication exactly as they are prescribed. · Keep your medication in the bottles provided by the pharmacist and keep a list of the medication names, dosages, and times to be taken in your wallet. · Do not take other medications without consulting your doctor. What to do at Home      Recommended activity: No Lifting for 6 weeks. Follow-up with [place]  [facility]. Call for an appointment (if not already scheduled)            [phone]. Information obtained by :  I understand that if any problems occur once I am at home I am to contact my physician. I understand and acknowledge receipt of the instructions indicated above. Physician's or R.N.'s Signature                                                                  Date/Time                                                                                                                                              Patient or Representative Signature                                                          Date/Time      DISCHARGE SUMMARY from Nurse    The following personal items are in your possession at time of discharge:    Dental Appliances: None  Visual Aid: Glasses     Home Medications: None  Jewelry: None  Clothing:  At bedside  Other Valuables: None             PATIENT INSTRUCTIONS:    After general anesthesia or intravenous sedation, for 24 hours or while taking prescription Narcotics:  · Limit your activities  · Do not drive and operate hazardous machinery  · Do not make important personal or business decisions  · Do  not drink alcoholic beverages  · If you have not urinated within 8 hours after discharge, please contact your surgeon on call. Report the following to your surgeon:  · Excessive pain, swelling, redness or odor of or around the surgical area  · Temperature over 100.5  · Nausea and vomiting lasting longer than 4 hours or if unable to take medications  · Any signs of decreased circulation or nerve impairment to extremity: change in color, persistent  numbness, tingling, coldness or increase pain  · Any questions      These are general instructions for a healthy lifestyle:    No smoking/ No tobacco products/ Avoid exposure to second hand smoke    Surgeon General's Warning:  Quitting smoking now greatly reduces serious risk to your health. Obesity, smoking, and sedentary lifestyle greatly increases your risk for illness    A healthy diet, regular physical exercise & weight monitoring are important for maintaining a healthy lifestyle    You may be retaining fluid if you have a history of heart failure or if you experience any of the following symptoms:  Weight gain of 3 pounds or more overnight or 5 pounds in a week, increased swelling in our hands or feet or shortness of breath while lying flat in bed. Please call your doctor as soon as you notice any of these symptoms; do not wait until your next office visit. Recognize signs and symptoms of STROKE:    F-face looks uneven    A-arms unable to move or move unevenly    S-speech slurred or non-existent    T-time-call 911 as soon as signs and symptoms begin-DO NOT go       Back to bed or wait to see if you get better-TIME IS BRAIN. Warning Signs of HEART ATTACK     Call 911 if you have these symptoms:   Chest discomfort. Most heart attacks involve discomfort in the center of the chest that lasts more than a few minutes, or that goes away and comes back. It can feel like uncomfortable pressure, squeezing, fullness, or pain.  Discomfort in other areas of the upper body.  Symptoms can include pain or discomfort in one or both arms, the back, neck, jaw, or stomach.  Shortness of breath with or without chest discomfort.  Other signs may include breaking out in a cold sweat, nausea, or lightheadedness. Don't wait more than five minutes to call 911 - MINUTES MATTER! Fast action can save your life. Calling 911 is almost always the fastest way to get lifesaving treatment. Emergency Medical Services staff can begin treatment when they arrive -- up to an hour sooner than if someone gets to the hospital by car. The discharge information has been reviewed with the patient. The patient verbalized understanding. Discharge medications reviewed with the patient and appropriate educational materials and side effects teaching were provided.

## 2017-05-24 NOTE — PROGRESS NOTES
Spiritual Care Partner Volunteer visited patient in [address] [place] [address] on 5/23/17. Documented by:  Xiomy Pang M.Div.    Paging Service [phone])

## 2017-06-14 ENCOUNTER — OFFICE VISIT (OUTPATIENT)
Dept: FAMILY MEDICINE CLINIC | Age: 72
End: 2017-06-14

## 2017-06-14 VITALS
DIASTOLIC BLOOD PRESSURE: 71 MMHG | TEMPERATURE: 97.1 F | HEART RATE: 63 BPM | WEIGHT: 199.6 LBS | HEIGHT: 68 IN | OXYGEN SATURATION: 95 % | RESPIRATION RATE: 16 BRPM | BODY MASS INDEX: 30.25 KG/M2 | SYSTOLIC BLOOD PRESSURE: 126 MMHG

## 2017-06-14 DIAGNOSIS — E78.00 PURE HYPERCHOLESTEROLEMIA: Primary | ICD-10-CM

## 2017-06-14 DIAGNOSIS — C67.9 MALIGNANT NEOPLASM OF URINARY BLADDER, UNSPECIFIED SITE (HCC): ICD-10-CM

## 2017-06-14 RX ORDER — SIMVASTATIN 20 MG/1
TABLET, FILM COATED ORAL
Qty: 90 TAB | Refills: 0 | Status: SHIPPED | OUTPATIENT
Start: 2017-06-14 | End: 2017-08-30 | Stop reason: SDUPTHER

## 2017-06-14 NOTE — PROGRESS NOTES
Chief Complaint   Patient presents with   [facility] Follow Up     [facility] on 5/22/17 had surgery for bladder cancer. 1. Have you been to the ER, urgent care clinic since your last visit? Hospitalized since your last visit? Yes When: 5/22/17 Where: [facility] Reason for visit: Bladder surgery. 2. Have you seen or consulted any other health care providers outside of the [address] [place] [address] since your last visit? Include any pap smears or colon screening. Yes When: 3/17 Where: [facility] in [place] Reason for visit: Tests for prostate cancer. I have reviewed Health Maintenance with the patient and updated. Advance Care Planning information reviewed and given to the patient at a previous visit. The patient was counseled on the dangers of tobacco use, and Patient is a non smoker. Reviewed strategies to maximize success, including Continue not to smoke.

## 2017-06-14 NOTE — PROGRESS NOTES
Discharged 5/23. Saw urol 5/30. To start BCG July 7. Sees urol in f/u in Sept.  Has [facility] and lab appt in August.  Goes to [facility] annually to get MRI and monitor prostate cancer. Visit Vitals    /71 (BP 1 Location: Right arm, BP Patient Position: Sitting)    Pulse 63    Temp 97.1 °F (36.2 °C) (Oral)    Resp 16    Ht 5' 8\" (1.727 m)    Wt 199 lb 9.6 oz (90.5 kg)    SpO2 95%    BMI 30.35 kg/m2       Patient alert and cooperative. Reviewed above. Assessment:  1. Recent bladder cancer diagnosis. 2. Previous prostate cancer diagnosis, being followed by active surveillance at [place] [address]. Plan:  1. Patient to start BCG treatments next month for bladder cancer. 2. Has physical and labs scheduled in August.  3. Follow otherwise here prn.

## 2017-08-30 ENCOUNTER — OFFICE VISIT (OUTPATIENT)
Dept: FAMILY MEDICINE CLINIC | Age: 72
End: 2017-08-30

## 2017-08-30 VITALS
SYSTOLIC BLOOD PRESSURE: 126 MMHG | HEART RATE: 69 BPM | HEIGHT: 67 IN | RESPIRATION RATE: 16 BRPM | DIASTOLIC BLOOD PRESSURE: 73 MMHG | WEIGHT: 197.1 LBS | OXYGEN SATURATION: 95 % | BODY MASS INDEX: 30.93 KG/M2 | TEMPERATURE: 96.9 F

## 2017-08-30 DIAGNOSIS — E78.00 PURE HYPERCHOLESTEROLEMIA: ICD-10-CM

## 2017-08-30 DIAGNOSIS — Z00.00 LABORATORY EXAM ORDERED AS PART OF ROUTINE GENERAL MEDICAL EXAMINATION: ICD-10-CM

## 2017-08-30 DIAGNOSIS — C61 PROSTATE CANCER (HCC): ICD-10-CM

## 2017-08-30 DIAGNOSIS — B35.3 TINEA PEDIS, UNSPECIFIED LATERALITY: ICD-10-CM

## 2017-08-30 DIAGNOSIS — E55.9 VITAMIN D DEFICIENCY: ICD-10-CM

## 2017-08-30 DIAGNOSIS — N18.2 CHRONIC KIDNEY DISEASE, STAGE 2, MILDLY DECREASED GFR: ICD-10-CM

## 2017-08-30 DIAGNOSIS — H40.9 GLAUCOMA, UNSPECIFIED GLAUCOMA, UNSPECIFIED LATERALITY: ICD-10-CM

## 2017-08-30 DIAGNOSIS — Z00.00 PHYSICAL EXAM: Primary | ICD-10-CM

## 2017-08-30 DIAGNOSIS — C67.9 MALIGNANT NEOPLASM OF URINARY BLADDER, UNSPECIFIED SITE (HCC): ICD-10-CM

## 2017-08-30 DIAGNOSIS — B35.1 TOENAIL FUNGUS: ICD-10-CM

## 2017-08-30 RX ORDER — SIMVASTATIN 20 MG/1
TABLET, FILM COATED ORAL
Qty: 90 TAB | Refills: 3 | Status: SHIPPED | OUTPATIENT
Start: 2017-08-30 | End: 2018-10-17 | Stop reason: SDUPTHER

## 2017-08-30 RX ORDER — CHOLECALCIFEROL (VITAMIN D3) 125 MCG
1 CAPSULE ORAL DAILY
COMMUNITY
End: 2019-11-26 | Stop reason: ALTCHOICE

## 2017-08-30 RX ORDER — ASPIRIN 81 MG/1
81 TABLET ORAL DAILY
COMMUNITY

## 2017-08-30 NOTE — PROGRESS NOTES
Chief Complaint   Patient presents with    Complete Physical    Labs     Fasting     1. Have you been to the ER, urgent care clinic since your last visit? Hospitalized since your last visit? Yes When: 5/17 Where: [facility] Reason for visit: Bladder surgery for Cancer    2. Have you seen or consulted any other health care providers outside of the [address] [place] [address] since your last visit? Include any pap smears or colon screening. Yes When: 8/17 Where: [facility] Reason for visit: Prostate Cancer    I have reviewed Health Maintenance with the patient and updated. Advance Care Planning information reviewed and given to the patient. The patient was counseled on the dangers of tobacco use, and Patient is a non smoker. Reviewed strategies to maximize success, including Continue not to smoke. Complete Physical Exam Male  Pre-Visit Questions:    1. Do you follow a low fat or low salt diet ? n  2. Do you follow an exercise program? y  3. Have you had your tetanus booster in the last 10 years? y  3. Have you ever had a Pneumonia vaccine? y  11. Do you smoke? n  6. Do you consider yourself overweight? y  7. Do you perform Testicular self exam?y  8. Is there a family history of CAD< age 48? n  5. Is there a family history of Cancer? y  8. Do you have any Cancer risks? y  6. Have you had a colonoscopy? y  15. Have you been to your eye doctor past year?   y  15. Have you been to your dentist in the last 6 months?  y  15.   Have you had your flu shot for this season?  y

## 2017-08-30 NOTE — PROGRESS NOTES
HISTORY OF PRESENT ILLNESS  Richard Lindquist is a 70 y.o. male. HPI   Here for [facility]. Eye doctor 2x/yr for glaucoma. Dentist 2 x annually. [facility] for f/u bladder cancer, prostate cancer. Colonoscopy '12. Knows due 5 yr repeat. Exercises daily walking. 4 bladder surgeries, 2 BCG txs since 8/2016. No other signif hx past yr. Patient Active Problem List   Diagnosis Code    Glaucoma H40.9    Pure hypercholesterolemia E78.00    Vitamin D deficiency E55.9    Chronic kidney disease, stage 2, mildly decreased GFR N18.2    Tinea pedis B35.3    Toenail fungus B35.1    Prostate cancer ([facility] C61    Malignant neoplasm of urinary bladder (HCC) C67.9     Current Outpatient Prescriptions   Medication Sig Dispense Refill    aspirin delayed-release 81 mg tablet Take  by mouth daily.  PRENATAL VITS/IRON FUM/FOLIC (M-VIT PO) Take  by mouth daily. Multi vit      cholecalciferol, vitamin D3, (VITAMIN D3) 2,000 unit tab Take  by mouth daily.  simvastatin (ZOCOR) 20 mg tablet TAKE 1 TABLET BY MOUTH AT BEDTIME 90 Tab 0    timolol maleate (ISTALOL) 0.5 % drpd ophthalmic solution Administer 1 Drop to right eye daily.  travoprost (TRAVATAN Z) 0.004 % ophthalmic solution Administer 1 Drop to both eyes every evening.  cetirizine (ZYRTEC) 10 mg tablet Take 10 mg by mouth daily.        Allergies   Allergen Reactions    Nsaids (Non-Steroidal Anti-Inflammatory Drug) Other (comments)     As per physcian order due to glaucoma and CKD     Past Medical History:   Diagnosis Date    Allergic rhinitis 1/30/14    [facility]    Arthritis     HANDS    Bladder cancer ([facility] 06/02/2016    Dr. Jonnathan Mckeon 8/10/16 f/u note path report too    Bladder cancer ([facility] 06/2016    Chronic kidney disease     Chronic kidney disease, stage II (mild)     Diverticula of colon 7/18/12    mia [place]    Diverticulosis of colon     Elevated PSA 10/2011    Dr Jonnathan Mckeon    Glaucoma     9/21/16 note from [facility] Hypercholesterolemia     near syncope 10/06/15    notes from [facility] Other ill-defined conditions     in clinical trial at [place] [address] for prostate cancer    Pneumonia     Prostate cancer (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/2014    luderica    Tinea cruris 3/09    and rosacea    Varicose veins     Vitamin D deficiency      Past Surgical History:   Procedure Laterality Date    ABDOMEN SURGERY PROC UNLISTED  00    umbilical hernia    CT HEART W/O CONT WITH CALCIUM  3/3/04    score 65.57    ENDOSCOPY, COLON, DIAGNOSTIC  8/06    5 yrs    HX APPENDECTOMY      HX OTHER SURGICAL  7/18/12    colonoscopy 5 year repeat    HX OTHER SURGICAL  4/2012    cyst on groin removed Dr Felix Meade    [facility]  10/21/15    echocardiogram due to syncope    HX UROLOGICAL  '90    hydrocele    HX UROLOGICAL      TURB X 3    HX UROLOGICAL  05/2017    Biopsy and tumor [facility] bladder [facility]     Family History   Problem Relation Age of Onset    Heart Disease Mother     Cancer Mother      lung    Asthma Mother     Diabetes Father     Thyroid Disease Sister     Cancer Sister 61     breast and throat    Stroke Paternal Grandfather     MS Sister     Cancer Sister      THYROID    Anesth Problems Neg Hx      Social History   Substance Use Topics    Smoking status: Never Smoker    Smokeless tobacco: Never Used    Alcohol use No      Lab Results  Component Value Date/Time   WBC 4.2 05/10/2017 09:02 AM   HGB 14.4 05/23/2017 03:11 AM   HCT 42.7 05/10/2017 09:02 AM   PLATELET 578 35/63/8484 09:02 AM   MCV 87.5 05/10/2017 09:02 AM     Lab Results  Component Value Date/Time   Glucose 112 05/23/2017 03:11 AM   LDL, calculated 71 06/29/2016 08:34 AM   Creatinine (POC) 1.0 03/20/2017 04:19 PM   Creatinine 1.04 05/23/2017 03:11 AM      Lab Results  Component Value Date/Time   Cholesterol, total 136 06/29/2016 08:34 AM   HDL Cholesterol 43 06/29/2016 08:34 AM   LDL, calculated 71 06/29/2016 08:34 AM   Triglyceride 110 06/29/2016 08:34 AM   Lab Results  Component Value Date/Time   ALT (SGPT) 24 05/10/2017 09:02 AM   AST (SGOT) 18 05/10/2017 09:02 AM   Alk. phosphatase 84 05/10/2017 09:02 AM   Bilirubin, direct 0.13 03/28/2012 08:00 AM   Bilirubin, total 0.6 05/10/2017 09:02 AM   Albumin 3.7 05/10/2017 09:02 AM   Protein, total 7.1 05/10/2017 09:02 AM   INR 1.0 05/10/2017 09:02 AM   Prothrombin time 10.4 05/10/2017 09:02 AM   PLATELET 135 15/88/4988 09:02 AM       Lab Results  Component Value Date/Time   GFR est non-AA >60 05/23/2017 03:11 AM   GFRNA, POC >60 03/20/2017 04:19 PM   GFR est AA >60 05/23/2017 03:11 AM   GFRAA, POC >60 03/20/2017 04:19 PM   Creatinine 1.04 05/23/2017 03:11 AM   Creatinine (POC) 1.0 03/20/2017 04:19 PM   BUN 8 05/23/2017 03:11 AM   Sodium 140 05/23/2017 03:11 AM   Potassium 3.9 05/23/2017 03:11 AM   Chloride 106 05/23/2017 03:11 AM   CO2 27 05/23/2017 03:11 AM   Magnesium 1.7 02/02/2014 04:10 PM   Lab Results  Component Value Date/Time   TSH 1.80 10/09/2015 08:25 PM      Lab Results   Component Value Date/Time    Glucose 112 05/23/2017 03:11 AM      Lab Results   Component Value Date/Time    Prostate Specific Ag 4.4 10/05/2012 08:59 AM    Prostate Specific Ag 5.2 10/03/2011 09:02 AM       Fasting for labs. Review of Systems   Constitutional: Positive for weight loss. HENT: Negative. Eyes: Negative. Respiratory: Negative. Cardiovascular: Negative. Gastrointestinal: Negative. Genitourinary: Negative. Musculoskeletal: Negative. Skin: Negative. Neurological: Negative. Endo/Heme/Allergies: Positive for environmental allergies. Psychiatric/Behavioral: Negative.         Physical Exam   Vitals:    08/30/17 0946   BP: 126/73   Pulse: 69   Resp: 16   Temp: 96.9 °F (36.1 °C)   TempSrc: Oral   SpO2: 95%   Weight: 197 lb 1.6 oz (89.4 kg)   Height: 5' 8\" (1.727 m)       General  alert, cooperative, no distress, appears stated age   Head  Normocephalic, without obvious abnormality, atraumatic   Eyes  conjunctivae/corneas clear. PERRL. Fundi benign   Ears  Canals with cerumen, TMs clear   Nose Passages patent. Mucosa normal. No drainage or sinus tenderness. Throat Lips, mucosa, and tongue normal. Teeth and gums normal.  Post pharynx neg. Neck supple, nontender, no adenopathy, thyroid: not enlarged, no masses/nodules, no carotid bruits   Back   symmetric, no curvature. Dec ROM. No CVA tenderness   Lungs   clear to auscultation bilaterally   Chest wall  no tenderness   Heart  regular rate and rhythm, no murmur, click, rub or gallop   Abdomen   soft, non-tender. Bowel sounds normal. No masses,  No organomegaly. Ventral hernia. Genitalia  Deferred per urol   Rectal  Deferred per urol   Extremities extremities normal, atraumatic, no cyanosis or edema. Unchanged fibroma right forearm. Pulses 2+ and symmetric   Skin [place] t. Pedis. Neurologic Romberg neg, nml heel, toe and Tandem gait. DTRs 2+ symmetric except absent Achilles. ASSESSMENT and PLAN    ICD-10-CM ICD-9-CM    1. Physical exam Z00.00 V70.9 VITAMIN D, 25 HYDROXY      TSH 3RD GENERATION      LIPID PANEL   2. Pure hypercholesterolemia E78.00 272.0 simvastatin (ZOCOR) 20 mg tablet      LIPID PANEL   3. Malignant neoplasm of urinary bladder, unspecified site (HCC) C67.9 188.9    4. Toenail fungus B35.1 110.1    5. [facility] C61 185    6. Tinea pedis, unspecified laterality B35.3 110.4    7. Chronic kidney disease, stage 2, mildly decreased GFR N18.2 585.2 VITAMIN D, 25 HYDROXY   8. Vitamin D deficiency E55.9 268.9 VITAMIN D, 25 HYDROXY   9. Glaucoma, unspecified glaucoma, unspecified laterality H40.9 365.9    10.  Laboratory exam ordered as part of routine general medical examination Z00.00 V72.62 VITAMIN D, 25 HYDROXY      TSH 3RD GENERATION      LIPID PANEL     Follow-up Disposition: Not on File

## 2017-08-30 NOTE — MR AVS SNAPSHOT
Visit Information Date & Time Provider Department Dept. Phone Encounter #  
 8/30/2017  9:40 AM Darinel Olivas MD [facility] [phone] [id number] Upcoming Health Maintenance Date Due INFLUENZA AGE 9 TO ADULT 11/28/2017* MEDICARE YEARLY EXAM 8/31/2018 GLAUCOMA SCREENING Q2Y 9/21/2018 COLONOSCOPY 7/18/2022 DTaP/Tdap/Td series (2 - Td) 10/5/2022 *Topic was postponed. The date shown is not the original due date. Allergies as of 8/30/2017  Review Complete On: 8/30/2017 By: Darinel Olivas MD  
  
 Severity Noted Reaction Type Reactions Nsaids (Non-steroidal Anti-inflammatory Drug)  02/02/2014    Other (comments) As per physcian order due to glaucoma and CKD Current Immunizations  Reviewed on 8/30/2017 Name Date Influenza High Dose Vaccine PF 11/19/2016, 10/3/2015 Influenza Vaccine 11/22/2013 Influenza Vaccine Split 9/30/2010 Pneumococcal Conjugate (PCV-13) 11/1/2015 Pneumococcal Polysaccharide (PPSV-23) 9/30/2010, 9/12/2005 TD Vaccine 8/23/2002 TDAP Vaccine 10/5/2012 Zoster 9/30/2009 Reviewed by Naty Marte RN on 8/30/2017 at  9:55 AM  
You Were Diagnosed With   
  
 Codes Comments Physical exam    -  Primary ICD-10-CM: Z00.00 ICD-9-CM: V70.9 Pure hypercholesterolemia     ICD-10-CM: E78.00 ICD-9-CM: 272.0 Malignant neoplasm of urinary bladder, unspecified site [facility])     ICD-10-CM: C67.9 ICD-9-CM: 188. 9 Toenail fungus     ICD-10-CM: B35.1 ICD-9-CM: 110.1 Prostate cancer [facility])     ICD-10-CM: L65 ICD-9-CM: 580 Tinea pedis, unspecified laterality     ICD-10-CM: B35.3 ICD-9-CM: 110.4 Chronic kidney disease, stage 2, mildly decreased GFR     ICD-10-CM: N18.2 ICD-9-CM: 896. 2 Vitamin D deficiency     ICD-10-CM: E55.9 ICD-9-CM: 268.9 Glaucoma, unspecified glaucoma, unspecified laterality     ICD-10-CM: H40.9 ICD-9-CM: 365.9 Vitals BP Pulse Temp Resp Height(growth percentile) Weight(growth percentile) 126/73 (BP 1 Location: Right arm, BP Patient Position: Sitting) 69 96.9 °F (36.1 °C) (Oral) 16 5' 8\" (1.727 m) 197 lb 1.6 oz (89.4 kg) SpO2 BMI Smoking Status 95% 29.97 kg/m2 Never Smoker Vitals History BMI and BSA Data Body Mass Index Body Surface Area  
 29.97 kg/m 2 2.07 m 2 Preferred Pharmacy Pharmacy Name Phone [facility][id number] - [address] [phone] Your Updated Medication List  
  
   
This list is accurate as of: 8/30/17 10:19 AM.  Always use your most recent med list.  
  
  
  
  
 aspirin delayed-release 81 mg tablet Take  by mouth daily. ISTALOL 0.5 % Drpd ophthalmic solution Generic drug:  timolol maleate Administer 1 Drop to right eye daily. M-VIT PO Take  by mouth daily. Multi vit  
  
 simvastatin 20 mg tablet Commonly known as:  ZOCOR  
TAKE 1 TABLET BY MOUTH AT BEDTIME  
  
 TRAVATAN Z 0.004 % ophthalmic solution Generic drug:  travoprost  
Administer 1 Drop to both eyes every evening. VITAMIN D3 2,000 unit Tab Generic drug:  cholecalciferol (vitamin D3) Take  by mouth daily. ZyrTEC 10 mg tablet Generic drug:  cetirizine Take 10 mg by mouth daily. Introducing [facility]! Misha Mccloud introduces [employer] patient portal. Now you can access parts of your medical record, email your doctor's office, and request medication refills online. 1. In your internet browser, go to [email or web address] 2. Click on the First Time User? Click Here link in the Sign In box. You will see the New Member Sign Up page. 3. Enter your [employer] Access Code exactly as it appears below. You will not need to use this code after youve completed the sign-up process. If you do not sign up before the expiration date, you must request a new code. · [employer]ch[employer]ch[employer] publishing Access Code: [email or web address] Expires: 11/28/2017 10:19 AM 
 
4. Enter the last four digits of your Social Security Number (xxxx) and Date of Birth (mm/dd/yyyy) as indicated and click Submit. You will be taken to the next sign-up page. 5. Create a [employer] publishing ID. This will be your [employer] publishing login ID and cannot be changed, so think of one that is secure and easy to remember. 6. Create a [employer] publishing password. You can change your password at any time. 7. Enter your Password Reset Question and Answer. This can be used at a later time if you forget your password. 8. Enter your e-mail address. You will receive e-mail notification when new information is available in [address] 9. Click Sign Up. You can now view and download portions of your medical record. 10. Click the Download Summary menu link to download a portable copy of your medical information. If you have questions, please visit the Frequently Asked Questions section of the [employer] publishing website. Remember, [employer] is NOT to be used for urgent needs. For medical emergencies, dial 911. Now available from your iPhone and Android! Please provide this summary of care documentation to your next provider. Your primary care clinician is listed as [id number] If you have any questions after today's visit, please call [phone].

## 2017-08-30 NOTE — LETTER
9/5/2017 4:55 PM 
 
Mr. Jonny Mcmillan. [address] [id number] Dear Jonny Mcmillan.: 
 
Please find your most recent results below. Resulted Orders VITAMIN D, 25 HYDROXY Result Value Ref Range VITAMIN D, 25-HYDROXY 31.2 30.0 - 100.0 ng/mL Comment:  
   Vitamin D deficiency has been defined by the [address] [place] [address] practice guideline as a 
level of serum 25-OH vitamin D less than 20 ng/mL (1,2). The Endocrine Society went on to further define vitamin D 
insufficiency as a level between 21 and 29 ng/mL (2). 1. IOM ([place] of Medicine). 2010. Dietary reference 
   intakes for calcium and D. [address] [facility]: The 
   [employer]. 2. Gurinder MF, Megan GRACIA, Kannan ALMANZA, et al. 
   Evaluation, treatment, and prevention of vitamin D 
   deficiency: an Endocrine Society clinical practice 
   guideline. JCEM. 2011 Jul; 96(7):1911-30. Narrative Performed at:  [address]  [id number] : Brionna Lowe MD, Phone:  [phone] TSH 3RD GENERATION Result Value Ref Range TSH 1.640 0.450 - 4.500 uIU/mL Narrative Performed at:  [address]  [id number] : Brionna Lowe MD, Phone:  [phone] LIPID PANEL Result Value Ref Range Cholesterol, total 148 100 - 199 mg/dL Triglyceride 131 0 - 149 mg/dL HDL Cholesterol 46 >39 mg/dL VLDL, calculated 26 5 - 40 mg/dL LDL, calculated 76 0 - 99 mg/dL Narrative Performed at:  [address]  [id number] : Brionna Lowe MD, Phone:  [phone] CVD REPORT Result Value Ref Range INTERPRETATION Note Comment:  
   Supplement report is available. Narrative Performed at:  [address] [address] [place], [place]  [id number] : Tabatha Lucero PhD, Phone:  [phone] Please call me if you have any questions: [phone] Sincerely, Karl Howard MD

## 2017-08-31 LAB
25(OH)D3+25(OH)D2 SERPL-MCNC: 31.2 NG/ML (ref 30–100)
CHOLEST SERPL-MCNC: 148 MG/DL (ref 100–199)
HDLC SERPL-MCNC: 46 MG/DL
INTERPRETATION, 910389: NORMAL
LDLC SERPL CALC-MCNC: 76 MG/DL (ref 0–99)
TRIGL SERPL-MCNC: 131 MG/DL (ref 0–149)
TSH SERPL DL<=0.005 MIU/L-ACNC: 1.64 UIU/ML (ref 0.45–4.5)
VLDLC SERPL CALC-MCNC: 26 MG/DL (ref 5–40)

## 2018-11-21 DIAGNOSIS — E78.00 PURE HYPERCHOLESTEROLEMIA: ICD-10-CM

## 2018-11-21 RX ORDER — SIMVASTATIN 20 MG/1
TABLET, FILM COATED ORAL
Qty: 15 TAB | Refills: 0 | Status: SHIPPED | OUTPATIENT
Start: 2018-11-21 | End: 2018-12-06 | Stop reason: SDUPTHER

## 2018-12-06 DIAGNOSIS — E78.00 PURE HYPERCHOLESTEROLEMIA: ICD-10-CM

## 2018-12-06 RX ORDER — SIMVASTATIN 20 MG/1
TABLET, FILM COATED ORAL
Qty: 15 TAB | Refills: 0 | Status: SHIPPED | OUTPATIENT
Start: 2018-12-06 | End: 2018-12-19 | Stop reason: SDUPTHER

## 2018-12-06 NOTE — TELEPHONE ENCOUNTER
PCP: Divine Nieto MD    Last appt: 8/30/2017  No future appointments.     Requested Prescriptions     Pending Prescriptions Disp Refills    simvastatin (ZOCOR) 20 mg tablet [Pharmacy Med Name: SIMVASTATIN 20 MG TABLET] 15 Tab 0     Sig: TAKE 1 TABLET BY MOUTH EVERYDAY AT BEDTIME(NEEDS APPT)       Prior labs and Blood pressures:  BP Readings from Last 3 Encounters:   08/30/17 126/73   06/14/17 126/71   05/23/17 133/70     Lab Results   Component Value Date/Time    Sodium 140 05/23/2017 03:11 AM    Potassium 3.9 05/23/2017 03:11 AM    Chloride 106 05/23/2017 03:11 AM    CO2 27 05/23/2017 03:11 AM    Anion gap 7 05/23/2017 03:11 AM    Glucose 112 (H) 05/23/2017 03:11 AM    BUN 8 05/23/2017 03:11 AM    Creatinine 1.04 05/23/2017 03:11 AM    BUN/Creatinine ratio 8 (L) 05/23/2017 03:11 AM    GFR est AA >60 05/23/2017 03:11 AM    GFR est non-AA >60 05/23/2017 03:11 AM    Calcium 8.4 (L) 05/23/2017 03:11 AM     No results found for: HBA1C, HGBE8, TTB7ZPVX, PNL2FQVD  Lab Results   Component Value Date/Time    Cholesterol, total 148 08/30/2017 10:28 AM    HDL Cholesterol 46 08/30/2017 10:28 AM    LDL, calculated 76 08/30/2017 10:28 AM    VLDL, calculated 26 08/30/2017 10:28 AM    Triglyceride 131 08/30/2017 10:28 AM     Lab Results   Component Value Date/Time    Vitamin D 25-Hydroxy 25.5 (L) 10/03/2011 09:02 AM    VITAMIN D, 25-HYDROXY 31.2 08/30/2017 10:28 AM       Lab Results   Component Value Date/Time    TSH 1.640 08/30/2017 10:28 AM

## 2018-12-18 DIAGNOSIS — C61 PROSTATE CANCER (HCC): ICD-10-CM

## 2018-12-18 DIAGNOSIS — N18.2 CHRONIC KIDNEY DISEASE, STAGE 2, MILDLY DECREASED GFR: ICD-10-CM

## 2018-12-18 DIAGNOSIS — E55.9 VITAMIN D DEFICIENCY: ICD-10-CM

## 2018-12-18 DIAGNOSIS — E78.00 PURE HYPERCHOLESTEROLEMIA: ICD-10-CM

## 2018-12-18 DIAGNOSIS — C67.9 MALIGNANT NEOPLASM OF URINARY BLADDER, UNSPECIFIED SITE (HCC): Primary | ICD-10-CM

## 2018-12-20 LAB
25(OH)D3+25(OH)D2 SERPL-MCNC: 29.9 NG/ML (ref 30–100)
ALBUMIN SERPL-MCNC: 4.1 G/DL (ref 3.5–4.8)
ALBUMIN/GLOB SERPL: 1.4 {RATIO} (ref 1.2–2.2)
ALP SERPL-CCNC: 82 IU/L (ref 39–117)
ALT SERPL-CCNC: 28 IU/L (ref 0–44)
APPEARANCE UR: CLEAR
AST SERPL-CCNC: 26 IU/L (ref 0–40)
BACTERIA #/AREA URNS HPF: NORMAL /[HPF]
BASOPHILS # BLD AUTO: 0 X10E3/UL (ref 0–0.2)
BASOPHILS NFR BLD AUTO: 1 %
BILIRUB SERPL-MCNC: 0.4 MG/DL (ref 0–1.2)
BILIRUB UR QL STRIP: NEGATIVE
BUN SERPL-MCNC: 14 MG/DL (ref 8–27)
BUN/CREAT SERPL: 12 (ref 10–24)
CALCIUM SERPL-MCNC: 9.4 MG/DL (ref 8.6–10.2)
CASTS URNS QL MICRO: NORMAL /LPF
CHLORIDE SERPL-SCNC: 105 MMOL/L (ref 96–106)
CHOLEST SERPL-MCNC: 157 MG/DL (ref 100–199)
CO2 SERPL-SCNC: 24 MMOL/L (ref 20–29)
COLOR UR: YELLOW
CREAT SERPL-MCNC: 1.13 MG/DL (ref 0.76–1.27)
EOSINOPHIL # BLD AUTO: 0.1 X10E3/UL (ref 0–0.4)
EOSINOPHIL NFR BLD AUTO: 2 %
EPI CELLS #/AREA URNS HPF: NORMAL /HPF
ERYTHROCYTE [DISTWIDTH] IN BLOOD BY AUTOMATED COUNT: 13.9 % (ref 12.3–15.4)
GLOBULIN SER CALC-MCNC: 2.9 G/DL (ref 1.5–4.5)
GLUCOSE SERPL-MCNC: 99 MG/DL (ref 65–99)
GLUCOSE UR QL: NEGATIVE
HCT VFR BLD AUTO: 44.5 % (ref 37.5–51)
HDLC SERPL-MCNC: 34 MG/DL
HGB BLD-MCNC: 14.6 G/DL (ref 13–17.7)
HGB UR QL STRIP: ABNORMAL
IMM GRANULOCYTES # BLD: 0 X10E3/UL (ref 0–0.1)
IMM GRANULOCYTES NFR BLD: 0 %
INTERPRETATION, 910389: NORMAL
KETONES UR QL STRIP: NEGATIVE
LDLC SERPL CALC-MCNC: 55 MG/DL (ref 0–99)
LEUKOCYTE ESTERASE UR QL STRIP: NEGATIVE
LYMPHOCYTES # BLD AUTO: 1.4 X10E3/UL (ref 0.7–3.1)
LYMPHOCYTES NFR BLD AUTO: 32 %
MCH RBC QN AUTO: 28.9 PG (ref 26.6–33)
MCHC RBC AUTO-ENTMCNC: 32.8 G/DL (ref 31.5–35.7)
MCV RBC AUTO: 88 FL (ref 79–97)
MICRO URNS: ABNORMAL
MONOCYTES # BLD AUTO: 0.3 X10E3/UL (ref 0.1–0.9)
MONOCYTES NFR BLD AUTO: 7 %
MUCOUS THREADS URNS QL MICRO: PRESENT
NEUTROPHILS # BLD AUTO: 2.6 X10E3/UL (ref 1.4–7)
NEUTROPHILS NFR BLD AUTO: 58 %
NITRITE UR QL STRIP: NEGATIVE
PH UR STRIP: 5 [PH] (ref 5–7.5)
PLATELET # BLD AUTO: 219 X10E3/UL (ref 150–379)
POTASSIUM SERPL-SCNC: 4.2 MMOL/L (ref 3.5–5.2)
PROT SERPL-MCNC: 7 G/DL (ref 6–8.5)
PROT UR QL STRIP: NEGATIVE
PSA SERPL-MCNC: 8.5 NG/ML (ref 0–4)
RBC # BLD AUTO: 5.05 X10E6/UL (ref 4.14–5.8)
RBC #/AREA URNS HPF: NORMAL /HPF
SODIUM SERPL-SCNC: 143 MMOL/L (ref 134–144)
SP GR UR: 1.03 (ref 1–1.03)
TRIGL SERPL-MCNC: 339 MG/DL (ref 0–149)
UROBILINOGEN UR STRIP-MCNC: 0.2 MG/DL (ref 0.2–1)
VLDLC SERPL CALC-MCNC: 68 MG/DL (ref 5–40)
WBC # BLD AUTO: 4.4 X10E3/UL (ref 3.4–10.8)
WBC #/AREA URNS HPF: NORMAL /HPF

## 2018-12-20 NOTE — PROGRESS NOTES
Spoke with patient after obtaining 2 patient identifiers  Patient made aware of lab results with no questions. Patient verbalized understanding.

## 2019-03-06 ENCOUNTER — OFFICE VISIT (OUTPATIENT)
Dept: FAMILY MEDICINE CLINIC | Age: 74
End: 2019-03-06

## 2019-03-06 VITALS
DIASTOLIC BLOOD PRESSURE: 71 MMHG | RESPIRATION RATE: 18 BRPM | OXYGEN SATURATION: 96 % | SYSTOLIC BLOOD PRESSURE: 143 MMHG | BODY MASS INDEX: 32.47 KG/M2 | HEIGHT: 67 IN | HEART RATE: 80 BPM | TEMPERATURE: 99 F | WEIGHT: 206.9 LBS

## 2019-03-06 DIAGNOSIS — N18.2 CHRONIC KIDNEY DISEASE, STAGE 2, MILDLY DECREASED GFR: ICD-10-CM

## 2019-03-06 DIAGNOSIS — Z12.11 COLON CANCER SCREENING: ICD-10-CM

## 2019-03-06 DIAGNOSIS — C67.9 MALIGNANT NEOPLASM OF URINARY BLADDER, UNSPECIFIED SITE (HCC): ICD-10-CM

## 2019-03-06 DIAGNOSIS — E78.00 PURE HYPERCHOLESTEROLEMIA: ICD-10-CM

## 2019-03-06 DIAGNOSIS — R68.89 FLU-LIKE SYMPTOMS: ICD-10-CM

## 2019-03-06 DIAGNOSIS — C61 PROSTATE CANCER (HCC): ICD-10-CM

## 2019-03-06 DIAGNOSIS — Z00.00 PHYSICAL EXAM: Primary | ICD-10-CM

## 2019-03-06 LAB
FLUAV+FLUBV AG NOSE QL IA.RAPID: NEGATIVE POS/NEG
FLUAV+FLUBV AG NOSE QL IA.RAPID: NEGATIVE POS/NEG
VALID INTERNAL CONTROL?: YES

## 2019-03-06 RX ORDER — SIMVASTATIN 20 MG/1
TABLET, FILM COATED ORAL
Qty: 90 TAB | Refills: 3 | Status: SHIPPED | OUTPATIENT
Start: 2019-03-06 | End: 2019-11-20

## 2019-03-06 NOTE — PROGRESS NOTES
HISTORY OF PRESENT ILLNESS  Joyce Cole is a 68 y.o. male. HPI   Here for [facility]. Yest developed HA, scratchy throat, cough. Eye doctor past yr. Dentist 2 x annually. [facility] for prostate cancer annually. Urol for blaader cancer q 3 mos. Does BCG txs. Colonoscopy '12. Knows overdue 5 yr f/u. Walks daily. No signif hx past yr. Derm annually for AKs. Patient Active Problem List   Diagnosis Code    Glaucoma H40.9    Pure hypercholesterolemia E78.00    Vitamin D deficiency E55.9    Chronic kidney disease, stage 2, mildly decreased GFR N18.2    Tinea pedis B35.3    Toenail fungus B35.1    Prostate cancer ([facility] C61    Malignant neoplasm of urinary bladder (HCC) C67.9     Current Outpatient Medications   Medication Sig Dispense Refill    simvastatin (ZOCOR) 20 mg tablet TAKE 1 TABLET BY MOUTH EVERYDAY AT BEDTIME(NEEDS APPT) 90 Tab 3    aspirin delayed-release 81 mg tablet Take  by mouth daily.  PRENATAL VITS/IRON FUM/FOLIC (M-VIT PO) Take  by mouth daily. Multi vit      cholecalciferol, vitamin D3, (VITAMIN D3) 2,000 unit tab Take  by mouth daily.  timolol maleate (ISTALOL) 0.5 % drpd ophthalmic solution Administer 1 Drop to right eye daily.  travoprost (TRAVATAN Z) 0.004 % ophthalmic solution Administer 1 Drop to both eyes every evening.  cetirizine (ZYRTEC) 10 mg tablet Take 10 mg by mouth daily.        Allergies   Allergen Reactions    Nsaids (Non-Steroidal Anti-Inflammatory Drug) Other (comments)     As per physcian order due to glaucoma and CKD     Past Medical History:   Diagnosis Date    Allergic rhinitis 1/30/14    [facility]    Arthritis     HANDS    Bladder cancer ([facility] 06/02/2016    Dr. Kanika Meade 8/10/16 f/u note path report too    Bladder cancer ([facility] 06/2016    Chronic kidney disease     Chronic kidney disease, stage II (mild)     Diverticula of colon 7/18/12    mia monterroso    Diverticulosis of colon     Elevated PSA 10/2011    Dr Kanika Meade  Glaucoma     9/21/16 note from [address] [facility] Hypercholesterolemia     near syncope 10/06/15    notes from [facility] Other ill-defined conditions(799.89)     in clinical trial at [place] [address] for prostate cancer    Pneumonia     Prostate cancer ([facility] 11/2012      6/3/16 [facility] notes  [facility] 8/10/16 note    Prostatitis     Lud[place]    Prostatitis 07/2014    ludeman    Tinea cruris 3/09    and rosacea    Varicose veins     Vitamin D deficiency      Past Surgical History:   Procedure Laterality Date    ABDOMEN SURGERY PROC UNLISTED  00    umbilical hernia    CT HEART W/O CONT WITH CALCIUM  3/3/04    score 65.57    ENDOSCOPY, COLON, DIAGNOSTIC  8/06    5 yrs    HX APPENDECTOMY      HX OTHER SURGICAL  7/18/12    colonoscopy 5 year repeat    HX OTHER SURGICAL  4/2012    cyst on groin removed Dr Alexsander Cali    [facility]  10/21/15    echocardiogram due to syncope    HX UROLOGICAL  '90    hydrocele    HX UROLOGICAL      TURB X 3    HX UROLOGICAL  05/2017    Biopsy and tumor removed from his bladder at [address] [place] [address]     Family History   Problem Relation Age of Onset    Heart Disease Mother     Cancer Mother         lung    Asthma Mother     Diabetes Father     Thyroid Disease Sister     Cancer Sister 61        breast and throat    Stroke Paternal Grandfather     MS Sister     Cancer Sister         THYROID    Anesth Problems Neg Hx      Social History     Tobacco Use    Smoking status: Never Smoker    Smokeless tobacco: Never Used   Substance Use Topics    Alcohol use: No      Lab Results   Component Value Date/Time    WBC 4.4 12/19/2018 08:11 AM    HGB 14.6 12/19/2018 08:11 AM    HCT 44.5 12/19/2018 08:11 AM    PLATELET 495 72/80/1202 08:11 AM    MCV 88 12/19/2018 08:11 AM     Lab Results   Component Value Date/Time    Glucose 99 12/19/2018 08:11 AM    LDL, calculated 55 12/19/2018 08:11 AM    Creatinine (POC) 1.0 03/20/2017 04:19 PM    Creatinine 1.13 12/19/2018 08:11 AM      Lab Results Component Value Date/Time    Cholesterol, total 157 12/19/2018 08:11 AM    HDL Cholesterol 34 (L) 12/19/2018 08:11 AM    LDL, calculated 55 12/19/2018 08:11 AM    Triglyceride 339 (H) 12/19/2018 08:11 AM     Lab Results   Component Value Date/Time    ALT (SGPT) 28 12/19/2018 08:11 AM    AST (SGOT) 26 12/19/2018 08:11 AM    Alk. phosphatase 82 12/19/2018 08:11 AM    Bilirubin, direct 0.13 03/28/2012 08:00 AM    Bilirubin, total 0.4 12/19/2018 08:11 AM    Albumin 4.1 12/19/2018 08:11 AM    Protein, total 7.0 12/19/2018 08:11 AM    INR 1.0 05/10/2017 09:02 AM    Prothrombin time 10.4 05/10/2017 09:02 AM    PLATELET 769 80/57/8058 08:11 AM     Lab Results   Component Value Date/Time    GFR est non-AA 64 12/19/2018 08:11 AM    GFRNA, POC >60 03/20/2017 04:19 PM    GFR est AA 74 12/19/2018 08:11 AM    GFRAA, POC >60 03/20/2017 04:19 PM    Creatinine 1.13 12/19/2018 08:11 AM    Creatinine (POC) 1.0 03/20/2017 04:19 PM    BUN 14 12/19/2018 08:11 AM    Sodium 143 12/19/2018 08:11 AM    Potassium 4.2 12/19/2018 08:11 AM    Chloride 105 12/19/2018 08:11 AM    CO2 24 12/19/2018 08:11 AM    Magnesium 1.7 02/02/2014 04:10 PM     Lab Results   Component Value Date/Time    TSH 1.640 08/30/2017 10:28 AM      Lab Results   Component Value Date/Time    Glucose 99 12/19/2018 08:11 AM       Lab Results   Component Value Date/Time    Prostate Specific Ag 8.5 (H) 12/19/2018 08:11 AM    Prostate Specific Ag 4.4 (H) 10/05/2012 08:59 AM    Prostate Specific Ag 5.2 (H) 10/03/2011 09:02 AM    PSA, External 6.10 06/03/2016         Review of Systems   Constitutional: Positive for fever. HENT: Negative. Eyes:        Glaucoma. Respiratory: Negative. Cardiovascular: Negative. Gastrointestinal: Negative. Genitourinary: Positive for frequency. Musculoskeletal: Negative. Skin: Negative. Neurological: Negative. Endo/Heme/Allergies: Positive for environmental allergies. Psychiatric/Behavioral: Negative.         Physical Exam   Vitals:    03/06/19 0818   BP: 143/71   Pulse: 80   Resp: 18   Temp: 99 °F (37.2 °C)   TempSrc: Oral   SpO2: 96%   Weight: 206 lb 14.4 oz (93.8 kg)   Height: 5' 7\" (1.702 m)       General  alert, cooperative, no distress, appears stated age   Head  Normocephalic, without obvious abnormality, atraumatic   Eyes  conjunctivae/corneas clear. PERRL. Fundi benign   Ears  Canals with cerumen, TMs clear   Nose Passages patent. Mucosa normal. No drainage or sinus tenderness. Throat Lips, mucosa, and tongue normal. Teeth and gums normal.  Post pharynx neg. Neck supple, nontender, no adenopathy, thyroid: not enlarged, no masses/nodules, no carotid bruits   Back   symmetric, no curvature. Dec ROM. No CVA tenderness   Lungs   clear to auscultation bilaterally   Chest wall  no tenderness   Heart  regular rate and rhythm, no murmur, click, rub or gallop   Abdomen   soft, non-tender. Bowel sounds normal. No masses,  No organomegaly. Mod ventral hernia. Genitalia  Testes descended bilat w/o masses. No hernias   Rectal  Deferred per urol   Extremities extremities normal, atraumatic, no cyanosis or edema   Pulses 1-2+ and symmetric   Skin No rashes or lesions       Neurologic Romberg neg, nml heel, toe and Tandem gait. DTRs 1-2+ symmetric         ASSESSMENT and PLAN    ICD-10-CM ICD-9-CM    1. Physical exam Z00.00 V70.9    2. Pure hypercholesterolemia E78.00 272.0 simvastatin (ZOCOR) 20 mg tablet   3. Flu-like symptoms R68.89 780.99 AMB POC ANANT INFLUENZA A/B TEST   4. Colon cancer screening Z12.11 V76.51 REFERRAL TO GASTROENTEROLOGY   5. Malignant neoplasm of urinary bladder, unspecified site (HCC) C67.9 188.9    6. Chronic kidney disease, stage 2, mildly decreased GFR N18.2 585.2    7. Prostate cancer [facility]) C61 185      Follow-up Disposition:  Return in about 1 year (around 3/6/2020) for [facility], labs.

## 2019-03-06 NOTE — PROGRESS NOTES
====Vasu Florez Invitation====    Patient was invited to [facility] on this date and given the information folder for review. Recommended appointment with [facility] Vikki facilitator for ACP conversation regarding advance directives. [] Yes  [x] No  Referral sent to [facility] Choices team member or Coordinator for follow-up    [x] Yes  [] No  Patient scheduled an appointment.        Site of Referral:  [facility]

## 2019-03-06 NOTE — PROGRESS NOTES
Reviewed record in preparation for visit and have obtained necessary documentation. Identified pt with two pt identifiers(name and ). Health Maintenance Due   Topic    Shingrix Vaccine Age 49> (1 of 2)    Influenza Age 5 to Adult     GLAUCOMA SCREENING Q2Y          Chief Complaint   Patient presents with    Physical     Rm 1/ fasting/ last labs /    Cold Symptoms     Zyretec    Cough     sinus drainage,             Learning Assessment:  :     Learning Assessment 2015   PRIMARY LEARNER Patient   HIGHEST LEVEL OF EDUCATION - PRIMARY LEARNER  > 4 YEARS OF COLLEGE   BARRIERS PRIMARY LEARNER NONE   CO-LEARNER CAREGIVER No   PRIMARY LANGUAGE ENGLISH    NEED No   LEARNER PREFERENCE PRIMARY OTHER (COMMENT)   ANSWERED BY patient   RELATIONSHIP SELF       Depression Screening:  :     3 most recent PHQ Screens 3/6/2019   Little interest or pleasure in doing things Not at all   Feeling down, depressed, irritable, or hopeless Not at all   Total Score PHQ 2 0       Fall Risk Assessment:  :     Fall Risk Assessment, last 12 mths 3/6/2019   Able to walk? Yes   Fall in past 12 months? No       Abuse Screening:  :     No flowsheet data found. Coordination of Care Questionnaire:  :     1) Have you been to an emergency room, urgent care clinic since your last visit? no   Hospitalized since your last visit? no             2) Have you seen or consulted any other health care providers outside of [address] [place] [address] since your last visit? yes  (Include any pap smears or colon screenings in this section.)  [facility]    3) Do you have an Advance Directive on file? no    4) Are you interested in receiving information on Advance Directives? NO      Patient is accompanied by self I have received verbal consent from Ashok Rodriguez. to discuss any/all medical information while they are present in the room.

## 2019-03-07 ENCOUNTER — TELEPHONE (OUTPATIENT)
Dept: FAMILY MEDICINE CLINIC | Age: 74
End: 2019-03-07

## 2019-03-07 DIAGNOSIS — R68.89 FLU-LIKE SYMPTOMS: Primary | ICD-10-CM

## 2019-03-07 RX ORDER — OSELTAMIVIR PHOSPHATE 75 MG/1
75 CAPSULE ORAL 2 TIMES DAILY
Qty: 10 CAP | Refills: 0 | Status: SHIPPED | OUTPATIENT
Start: 2019-03-07 | End: 2019-03-12

## 2019-03-07 NOTE — TELEPHONE ENCOUNTER
Writer called patient back to notify him that Dr. Daniel Morales sent in Tamiflu to the [facility] on file. Patient did not answer. Writer left  requesting phone call back.

## 2019-03-07 NOTE — TELEPHONE ENCOUNTER
Patient called into the office stating that he was seen yesterday and that he was advised to call into the office if he did not feel any better. Patient stated that the provider would send Riddhi-flu into the pharmacy if he did not feel any better.

## 2019-11-13 ENCOUNTER — APPOINTMENT (OUTPATIENT)
Dept: GENERAL RADIOLOGY | Age: 74
DRG: 864 | End: 2019-11-13
Attending: EMERGENCY MEDICINE
Payer: COMMERCIAL

## 2019-11-13 ENCOUNTER — HOSPITAL ENCOUNTER (INPATIENT)
Age: 74
LOS: 7 days | Discharge: HOME HEALTH | DRG: 864 | End: 2019-11-20
Attending: EMERGENCY MEDICINE | Admitting: INTERNAL MEDICINE
Payer: COMMERCIAL

## 2019-11-13 DIAGNOSIS — R50.9 FEVER, UNSPECIFIED FEVER CAUSE: Primary | ICD-10-CM

## 2019-11-13 DIAGNOSIS — T50.A95A: ICD-10-CM

## 2019-11-13 LAB
ALBUMIN SERPL-MCNC: 3.3 G/DL (ref 3.5–5)
ALBUMIN/GLOB SERPL: 0.8 {RATIO} (ref 1.1–2.2)
ALP SERPL-CCNC: 249 U/L (ref 45–117)
ALT SERPL-CCNC: 44 U/L (ref 12–78)
ANION GAP SERPL CALC-SCNC: 5 MMOL/L (ref 5–15)
APPEARANCE UR: CLEAR
AST SERPL-CCNC: 30 U/L (ref 15–37)
BACTERIA URNS QL MICRO: NEGATIVE /HPF
BASOPHILS # BLD: 0 K/UL (ref 0–0.1)
BASOPHILS NFR BLD: 1 % (ref 0–1)
BILIRUB DIRECT SERPL-MCNC: 0.3 MG/DL (ref 0–0.2)
BILIRUB SERPL-MCNC: 0.8 MG/DL (ref 0.2–1)
BILIRUB UR QL CFM: NEGATIVE
BUN SERPL-MCNC: 9 MG/DL (ref 6–20)
BUN/CREAT SERPL: 8 (ref 12–20)
CALCIUM SERPL-MCNC: 8.7 MG/DL (ref 8.5–10.1)
CHLORIDE SERPL-SCNC: 105 MMOL/L (ref 97–108)
CO2 SERPL-SCNC: 25 MMOL/L (ref 21–32)
COLOR UR: ABNORMAL
CREAT SERPL-MCNC: 1.14 MG/DL (ref 0.7–1.3)
DIFFERENTIAL METHOD BLD: ABNORMAL
EOSINOPHIL # BLD: 0 K/UL (ref 0–0.4)
EOSINOPHIL NFR BLD: 0 % (ref 0–7)
EPITH CASTS URNS QL MICRO: ABNORMAL /LPF
ERYTHROCYTE [DISTWIDTH] IN BLOOD BY AUTOMATED COUNT: 13.4 % (ref 11.5–14.5)
FLUAV AG NPH QL IA: NEGATIVE
FLUBV AG NOSE QL IA: NEGATIVE
GLOBULIN SER CALC-MCNC: 4.2 G/DL (ref 2–4)
GLUCOSE SERPL-MCNC: 99 MG/DL (ref 65–100)
GLUCOSE UR STRIP.AUTO-MCNC: NEGATIVE MG/DL
HCT VFR BLD AUTO: 40.1 % (ref 36.6–50.3)
HGB BLD-MCNC: 13.4 G/DL (ref 12.1–17)
HGB UR QL STRIP: ABNORMAL
HYALINE CASTS URNS QL MICRO: ABNORMAL /LPF (ref 0–5)
IMM GRANULOCYTES # BLD AUTO: 0 K/UL (ref 0–0.04)
IMM GRANULOCYTES NFR BLD AUTO: 0 % (ref 0–0.5)
KETONES UR QL STRIP.AUTO: NEGATIVE MG/DL
LACTATE BLD-SCNC: 0.94 MMOL/L (ref 0.4–2)
LEUKOCYTE ESTERASE UR QL STRIP.AUTO: ABNORMAL
LYMPHOCYTES # BLD: 0.6 K/UL (ref 0.8–3.5)
LYMPHOCYTES NFR BLD: 13 % (ref 12–49)
MCH RBC QN AUTO: 28.6 PG (ref 26–34)
MCHC RBC AUTO-ENTMCNC: 33.4 G/DL (ref 30–36.5)
MCV RBC AUTO: 85.5 FL (ref 80–99)
MONOCYTES # BLD: 0.4 K/UL (ref 0–1)
MONOCYTES NFR BLD: 8 % (ref 5–13)
MUCOUS THREADS URNS QL MICRO: ABNORMAL /LPF
NEUTS SEG # BLD: 3.7 K/UL (ref 1.8–8)
NEUTS SEG NFR BLD: 78 % (ref 32–75)
NITRITE UR QL STRIP.AUTO: NEGATIVE
NRBC # BLD: 0 K/UL (ref 0–0.01)
NRBC BLD-RTO: 0 PER 100 WBC
PH UR STRIP: 5 [PH] (ref 5–8)
PLATELET # BLD AUTO: 239 K/UL (ref 150–400)
PMV BLD AUTO: 8.5 FL (ref 8.9–12.9)
POTASSIUM SERPL-SCNC: 4 MMOL/L (ref 3.5–5.1)
PROT SERPL-MCNC: 7.5 G/DL (ref 6.4–8.2)
PROT UR STRIP-MCNC: 30 MG/DL
RBC # BLD AUTO: 4.69 M/UL (ref 4.1–5.7)
RBC #/AREA URNS HPF: ABNORMAL /HPF (ref 0–5)
RBC MORPH BLD: ABNORMAL
SODIUM SERPL-SCNC: 135 MMOL/L (ref 136–145)
SP GR UR REFRACTOMETRY: 1.03 (ref 1–1.03)
UA: UC IF INDICATED,UAUC: ABNORMAL
UROBILINOGEN UR QL STRIP.AUTO: 0.2 EU/DL (ref 0.2–1)
WBC # BLD AUTO: 4.7 K/UL (ref 4.1–11.1)
WBC MORPH BLD: ABNORMAL
WBC URNS QL MICRO: ABNORMAL /HPF (ref 0–4)

## 2019-11-13 PROCEDURE — 94760 N-INVAS EAR/PLS OXIMETRY 1: CPT

## 2019-11-13 PROCEDURE — 83605 ASSAY OF LACTIC ACID: CPT

## 2019-11-13 PROCEDURE — 80048 BASIC METABOLIC PNL TOTAL CA: CPT

## 2019-11-13 PROCEDURE — 87804 INFLUENZA ASSAY W/OPTIC: CPT

## 2019-11-13 PROCEDURE — 81001 URINALYSIS AUTO W/SCOPE: CPT

## 2019-11-13 PROCEDURE — 87086 URINE CULTURE/COLONY COUNT: CPT

## 2019-11-13 PROCEDURE — 74011000258 HC RX REV CODE- 258: Performed by: EMERGENCY MEDICINE

## 2019-11-13 PROCEDURE — 74011250637 HC RX REV CODE- 250/637: Performed by: HOSPITALIST

## 2019-11-13 PROCEDURE — 99218 HC RM OBSERVATION: CPT

## 2019-11-13 PROCEDURE — 36415 COLL VENOUS BLD VENIPUNCTURE: CPT

## 2019-11-13 PROCEDURE — 74011250636 HC RX REV CODE- 250/636: Performed by: HOSPITALIST

## 2019-11-13 PROCEDURE — 85025 COMPLETE CBC W/AUTO DIFF WBC: CPT

## 2019-11-13 PROCEDURE — 99284 EMERGENCY DEPT VISIT MOD MDM: CPT

## 2019-11-13 PROCEDURE — 74011000250 HC RX REV CODE- 250: Performed by: HOSPITALIST

## 2019-11-13 PROCEDURE — 87040 BLOOD CULTURE FOR BACTERIA: CPT

## 2019-11-13 PROCEDURE — 80076 HEPATIC FUNCTION PANEL: CPT

## 2019-11-13 PROCEDURE — 65270000029 HC RM PRIVATE

## 2019-11-13 PROCEDURE — 74011250636 HC RX REV CODE- 250/636: Performed by: EMERGENCY MEDICINE

## 2019-11-13 PROCEDURE — 71045 X-RAY EXAM CHEST 1 VIEW: CPT

## 2019-11-13 PROCEDURE — 74011250637 HC RX REV CODE- 250/637: Performed by: EMERGENCY MEDICINE

## 2019-11-13 RX ORDER — TIMOLOL MALEATE 5 MG/ML
1 SOLUTION/ DROPS OPHTHALMIC DAILY
Status: DISCONTINUED | OUTPATIENT
Start: 2019-11-13 | End: 2019-11-20 | Stop reason: HOSPADM

## 2019-11-13 RX ORDER — ENOXAPARIN SODIUM 100 MG/ML
40 INJECTION SUBCUTANEOUS EVERY 24 HOURS
Status: DISCONTINUED | OUTPATIENT
Start: 2019-11-13 | End: 2019-11-20 | Stop reason: HOSPADM

## 2019-11-13 RX ORDER — ASPIRIN 81 MG/1
81 TABLET ORAL
Status: DISCONTINUED | OUTPATIENT
Start: 2019-11-14 | End: 2019-11-20 | Stop reason: HOSPADM

## 2019-11-13 RX ORDER — ACETAMINOPHEN 325 MG/1
650 TABLET ORAL
Status: DISCONTINUED | OUTPATIENT
Start: 2019-11-13 | End: 2019-11-16

## 2019-11-13 RX ORDER — ONDANSETRON 2 MG/ML
4 INJECTION INTRAMUSCULAR; INTRAVENOUS
Status: DISCONTINUED | OUTPATIENT
Start: 2019-11-13 | End: 2019-11-20 | Stop reason: HOSPADM

## 2019-11-13 RX ORDER — DOCUSATE SODIUM 100 MG/1
100 CAPSULE, LIQUID FILLED ORAL 2 TIMES DAILY
Status: DISCONTINUED | OUTPATIENT
Start: 2019-11-13 | End: 2019-11-20 | Stop reason: HOSPADM

## 2019-11-13 RX ORDER — SODIUM CHLORIDE 0.9 % (FLUSH) 0.9 %
5-40 SYRINGE (ML) INJECTION EVERY 8 HOURS
Status: DISCONTINUED | OUTPATIENT
Start: 2019-11-13 | End: 2019-11-20 | Stop reason: HOSPADM

## 2019-11-13 RX ORDER — MELATONIN
2000 DAILY
Status: DISCONTINUED | OUTPATIENT
Start: 2019-11-14 | End: 2019-11-20 | Stop reason: HOSPADM

## 2019-11-13 RX ORDER — SODIUM CHLORIDE 0.9 % (FLUSH) 0.9 %
5-40 SYRINGE (ML) INJECTION AS NEEDED
Status: DISCONTINUED | OUTPATIENT
Start: 2019-11-13 | End: 2019-11-20 | Stop reason: HOSPADM

## 2019-11-13 RX ORDER — PRAVASTATIN SODIUM 40 MG/1
40 TABLET ORAL
Status: DISCONTINUED | OUTPATIENT
Start: 2019-11-13 | End: 2019-11-16

## 2019-11-13 RX ORDER — LATANOPROST 50 UG/ML
1 SOLUTION/ DROPS OPHTHALMIC EVERY EVENING
Status: DISCONTINUED | OUTPATIENT
Start: 2019-11-13 | End: 2019-11-20 | Stop reason: HOSPADM

## 2019-11-13 RX ORDER — SWAB
1 SWAB, NON-MEDICATED MISCELLANEOUS DAILY
Status: DISCONTINUED | OUTPATIENT
Start: 2019-11-14 | End: 2019-11-20 | Stop reason: HOSPADM

## 2019-11-13 RX ORDER — LANOLIN ALCOHOL/MO/W.PET/CERES
3 CREAM (GRAM) TOPICAL
Status: DISCONTINUED | OUTPATIENT
Start: 2019-11-13 | End: 2019-11-20 | Stop reason: HOSPADM

## 2019-11-13 RX ORDER — ACETAMINOPHEN 500 MG
1000 TABLET ORAL
COMMUNITY
End: 2019-11-20

## 2019-11-13 RX ORDER — CETIRIZINE HCL 10 MG
10 TABLET ORAL DAILY
Status: DISCONTINUED | OUTPATIENT
Start: 2019-11-14 | End: 2019-11-20 | Stop reason: HOSPADM

## 2019-11-13 RX ORDER — ASPIRIN 81 MG/1
81 TABLET ORAL DAILY
Status: DISCONTINUED | OUTPATIENT
Start: 2019-11-13 | End: 2019-11-13

## 2019-11-13 RX ADMIN — ENOXAPARIN SODIUM 40 MG: 40 INJECTION SUBCUTANEOUS at 20:13

## 2019-11-13 RX ADMIN — Medication 10 ML: at 13:56

## 2019-11-13 RX ADMIN — PRAVASTATIN SODIUM 40 MG: 40 TABLET ORAL at 21:32

## 2019-11-13 RX ADMIN — ACETAMINOPHEN 650 MG: 325 TABLET ORAL at 20:13

## 2019-11-13 RX ADMIN — LATANOPROST 1 DROP: 50 SOLUTION OPHTHALMIC at 17:16

## 2019-11-13 RX ADMIN — CEFTRIAXONE 1 G: 1 INJECTION, POWDER, FOR SOLUTION INTRAMUSCULAR; INTRAVENOUS at 10:30

## 2019-11-13 RX ADMIN — Medication 1 CAPSULE: at 13:56

## 2019-11-13 RX ADMIN — ACETAMINOPHEN 650 MG: 325 TABLET ORAL at 13:59

## 2019-11-13 NOTE — PROGRESS NOTES
Saw patient in ER. Formal consult note to follow. Recommend admission, ID consultation, AFB urine testing, consideration of anti-TB meds but defer to ID (e.g. INH, rifampin) for prolonged course. Should not receive BCG treatments again based on this.     Ciera Musa

## 2019-11-13 NOTE — H&P
Hospitalist Admission Note    NAME: Oneil Read. :  1945   MRN:  [id number]     Date/Time:  2019 11:32 AM    Patient PCP: Vinod Whittaker MD   Urologist:  Dr. Maria C Angel and Dr. Rivera Pod  ______________________________________________________________________   Assessment & Plan:  Fever  --Unclear etiology. Question prostatitis failed outpatient therapy or possibly related to BCG therapy. --No clear source of infection on exam.  Flu test negative. Chest x-ray negative. --will change from ciprofloxacin, started by [facility] for UTI, to Rocephin empirically. --Get infectious disease consult as requested by urologist.  Blood cultures obtained in the emergency room. --Observation status as he is not septic and nontoxic appearring    Prostate cancer  --In a study followed by [facility]. No metastasis per patient. History of bladder cancer, currently in remission per patient  --Has been getting BCG preventive therapy for the past 3 years according to the patient. Last treatment was on     Hyperlipidemia  Continue Zocor  Body mass index is 30 kg/m². Code: Full  DVT prophylaxis: Lovenox  Surrogate decision maker: Wife        Subjective:   CHIEF COMPLAINT:  Fever    HISTORY OF PRESENT ILLNESS:     Oneil Luevano is a 76 y.o. male presents with fever for 13 days. He has been getting treatment for bladder cancer with BCG for the past 3 years and is currently on prophylactic treatment. Last treatment was on . The next day he started having fever 100 to 101. Was seen at urologist office and started on Cipro for UTI. He took Cipro for 7 days and finished it last Friday. He continued to have fever and saw urologist again 3 days ago and was restarted on Cipro. His fever curve has been increasing going from 100-101 to 102.4 last night and again this morning. The fever would resolve with Tylenol.   Has mild headache with fever but no neck pain or stiffness. He has been having night sweats for the past 13 days along with decreased appetite and weight loss of 9 pounds. With the Cipro he has had some nausea, voice hoarseness and insomnia. He denies any vomiting abdominal pain, no dysuria or urinary frequency, no change in the color of his urine. Denies any back pain no joint pain or rash. He has not had any foreign travel in the past 6 months. He did have a sore spot in the back of his left ear for the past 3 days but it is getting better. We were asked to admit for work up and evaluation of the above problems.      Past Medical History:   Diagnosis Date    [facility] 1/30/14    [facility]    Arthritis     HANDS    Bladder cancer ([facility] [address] 06/02/2016    Dr. Gretchen Broderick 8/10/16 f/u note path report too    Bladder cancer ([facility] [address] 06/2016    Chronic kidney disease     Chronic kidney disease, stage II (mild)     Diverticula of colon 7/18/12    mia monterroso    Diverticulosis of colon     Elevated PSA 10/2011    Dr Gretchen Broderick    Glaucoma     9/21/16 note from [id number] [place] [address] Hypercholesterolemia     near syncope 10/06/15    notes from [place] [facility] Other ill-defined conditions(799.89)     in clinical trial at [place] [id number] for prostate cancer    Pneumonia     Prostate cancer ([facility] [address] 11/2012      6/3/16 [facility] notes  [facility] 8/10/16 note    Prostatitis     fAtab    Prostatitis 07/2014    aftab    Tinea cruris 3/09    and rosacea    Varicose veins     Vitamin D deficiency       Past Surgical History:   Procedure Laterality Date    ABDOMEN SURGERY PROC UNLISTED  00    umbilical hernia    CT HEART W/O CONT WITH CALCIUM  3/3/04    score 65.57    ENDOSCOPY, COLON, DIAGNOSTIC  8/06    5 yrs    HX APPENDECTOMY      HX OTHER SURGICAL  7/18/12    colonoscopy 5 year repeat    HX OTHER SURGICAL  4/2012    cyst on groin removed Dr Freda Kellogg OTHER SURGICAL  10/21/15    echocardiogram due to syncope    HX UROLOGICAL  '90    hydrocele  HX UROLOGICAL      TURB X 3    HX UROLOGICAL  05/2017    Biopsy and tumor removed from his bladder at [facility]     Social History     Tobacco Use    Smoking status: Never Smoker    Smokeless tobacco: Never Used   Substance Use Topics    Alcohol use: No      Family History   Problem Relation Age of Onset    Heart Disease Mother     Cancer Mother         lung    Asthma Mother     Diabetes Father     Thyroid Disease Sister     Cancer Sister 61        breast and throat    Stroke Paternal Grandfather     MS Sister     Cancer Sister         THYROID    Anesth Problems Neg Hx       Allergies   Allergen Reactions    Nsaids (Non-Steroidal Anti-Inflammatory Drug) Other (comments)     As per physcifabrice order due to glaucoma and CKD        Prior to Admission medications    Medication Sig Start Date End Date Taking? Authorizing Provider   Bifidobacterium Infantis (ALIGN) 4 mg cap Take 1 Tab by mouth daily. Yes Provider, Historical   acetaminophen (TYLENOL EXTRA STRENGTH) 500 mg tablet Take 1,000 mg by mouth every six (6) hours as needed for Pain. Yes Provider, Historical   simvastatin (ZOCOR) 20 mg tablet TAKE 1 TABLET BY MOUTH EVERYDAY AT BEDTIME(NEEDS APPT) 3/6/19  Yes Read, Yoko Cameron MD   aspirin delayed-release 81 mg tablet Take 81 mg by mouth daily. Yes Provider, Historical   PRENATAL VITS/IRON FUM/FOLIC (M-VIT PO) Take 1 Tab by mouth daily. Multi vit    Yes Provider, Historical   cholecalciferol, vitamin D3, (VITAMIN D3) 2,000 unit tab Take 1 Tab by mouth daily. Yes Provider, Historical   timolol maleate (ISTALOL) 0.5 % drpd ophthalmic solution Administer 1 Drop to right eye daily. Yes Provider, Historical   travoprost (TRAVATAN Z) 0.004 % ophthalmic solution Administer 1 Drop to both eyes every evening. Yes Other, MD Abby   cetirizine (ZYRTEC) 10 mg tablet Take 10 mg by mouth daily. Yes Other, MD Abby     REVIEW OF SYSTEMS:  POSITIVE= Bold.   Negative = normal text  General:  fever, chills, sweats, generalized weakness, weight loss/gain, loss of appetite  Eyes:  blurred vision, eye pain, loss of vision, diplopia  Ear Nose and Throat:  rhinorrhea, pharyngitis, voice hoarseness  Respiratory:   cough, sputum production, SOB, wheezing, OWEN, pleuritic pain  Cardiology:  chest pain, palpitations, orthopnea, PND, edema, syncope   Gastrointestinal:  abdominal pain, N/V, dysphagia, diarrhea, constipation, bleeding  Genitourinary:  frequency, urgency, dysuria, hematuria, incontinence  Muskuloskeletal :  arthralgia, myalgia  Hematology:  easy bruising, bleeding, lymphadenopathy  Dermatological:  rash, ulceration, pruritis  Endocrine:  hot flashes or polydipsia  Neurological:  Headache only when having fever, dizziness, confusion, focal weakness, paresthesia, memory loss, gait disturbance  Psychological: anxiety, depression, agitation      Objective:   VITALS:    Visit Vitals  /54   Pulse 66   Temp 98.9 °F (37.2 °C)   Resp 16   Ht 5' 8\" (1.727 m)   Wt 89.5 kg (197 lb 5 oz)   SpO2 97%   BMI 30.00 kg/m²     Temp (24hrs), Av.9 °F (37.2 °C), Min:98.9 °F (37.2 °C), Max:98.9 °F (37.2 °C)    Body mass index is 30 kg/m². PHYSICAL EXAM:    General:    Alert, cooperative, no distress, appears stated age. Nontoxic appearing   HEENT: Atraumatic, anicteric sclerae, pink conjunctivae. Left TM not visualized   due to cerumen. No oral ulcers, mucosa moist, throat clear. Hearing   intact. Neck:  Supple, symmetrical,  thyroid: non tender. No cervical    lymphadenopathy  Lungs:   Clear to auscultation bilaterally. No Wheezing or Rhonchi. No rales. Chest wall:  No tenderness  No Accessory muscle use. Heart:   Regular  rhythm,  No  murmur   No gallop. No edema. Abdomen:   Soft, non-tender. Not distended. Bowel sounds normal. No masses  Extremities: No cyanosis. No clubbing  Skin:     Not pale Not Jaundiced  Small pimple nodule behind left ear lobe   without redness or tenderness   Psych:  Good insight.   Not depressed. Not anxious or agitated. Neurologic: EOMs intact. No facial asymmetry. No aphasia or slurred speech. Symmetrical strength, Alert and oriented X 3. IMAGING RESULTS:   []       I have personally reviewed the actual   []     CXR  []     CT scan  CXR:  CT :  EKG:   ______________________________________________________________________  Care Plan discussed with:    Comments   Patient y    Family  y wife   RN     Care Manager                    Consultant:      ________________________________________________________________________  Prophylaxis:  GI none   DVT lovenox   ________________________________________________________________________  Recommended Disposition:   Home with Family y   HH/PT/OT/RN    SNF/LTC    NIKHIL    ________________________________________________________________________  Code Status:  Full Code y   DNR/DNI    ________________________________________________________________________  TOTAL TIME:  60 minutes    ______________________________________________________________________  Martha Aquino MD      Procedures: see electronic medical records for all procedures/Xrays and details which were not copied into this note but were reviewed prior to creation of Plan.     LAB DATA REVIEWED:    Recent Results (from the past 24 hour(s))   METABOLIC PANEL, BASIC    Collection Time: 11/13/19  8:56 AM   Result Value Ref Range    Sodium 135 (L) 136 - 145 mmol/L    Potassium 4.0 3.5 - 5.1 mmol/L    Chloride 105 97 - 108 mmol/L    CO2 25 21 - 32 mmol/L    Anion gap 5 5 - 15 mmol/L    Glucose 99 65 - 100 mg/dL    BUN 9 6 - 20 MG/DL    Creatinine 1.14 0.70 - 1.30 MG/DL    BUN/Creatinine ratio 8 (L) 12 - 20      GFR est AA >60 >60 ml/min/1.73m2    GFR est non-AA >60 >60 ml/min/1.73m2    Calcium 8.7 8.5 - 10.1 MG/DL   CBC WITH AUTOMATED DIFF    Collection Time: 11/13/19  8:56 AM   Result Value Ref Range    WBC 4.7 4.1 - 11.1 K/uL    RBC 4.69 4.10 - 5.70 M/uL    HGB 13.4 12.1 - 17.0 g/dL    HCT 40.1 36.6 - 50.3 %    MCV 85.5 80.0 - 99.0 FL    MCH 28.6 26.0 - 34.0 PG    MCHC 33.4 30.0 - 36.5 g/dL    RDW 13.4 11.5 - 14.5 %    PLATELET 775 021 - 565 K/uL    MPV 8.5 (L) 8.9 - 12.9 FL    NRBC 0.0 0  WBC    ABSOLUTE NRBC 0.00 0.00 - 0.01 K/uL    NEUTROPHILS 78 (H) 32 - 75 %    LYMPHOCYTES 13 12 - 49 %    MONOCYTES 8 5 - 13 %    EOSINOPHILS 0 0 - 7 %    BASOPHILS 1 0 - 1 %    IMMATURE GRANULOCYTES 0 0.0 - 0.5 %    ABS. NEUTROPHILS 3.7 1.8 - 8.0 K/UL    ABS. LYMPHOCYTES 0.6 (L) 0.8 - 3.5 K/UL    ABS. MONOCYTES 0.4 0.0 - 1.0 K/UL    ABS. EOSINOPHILS 0.0 0.0 - 0.4 K/UL    ABS. BASOPHILS 0.0 0.0 - 0.1 K/UL    ABS. IMM.  GRANS. 0.0 0.00 - 0.04 K/UL    DF SMEAR SCANNED      RBC COMMENTS NORMOCYTIC, NORMOCHROMIC      WBC COMMENTS TOXIC GRANULATION     URINALYSIS W/ REFLEX CULTURE    Collection Time: 11/13/19  8:56 AM   Result Value Ref Range    Color DARK YELLOW      Appearance CLEAR CLEAR      Specific gravity 1.028 1.003 - 1.030      pH (UA) 5.0 5.0 - 8.0      Protein 30 (A) NEG mg/dL    Glucose NEGATIVE  NEG mg/dL    Ketone NEGATIVE  NEG mg/dL    Blood TRACE (A) NEG      Urobilinogen 0.2 0.2 - 1.0 EU/dL    Nitrites NEGATIVE  NEG      Leukocyte Esterase SMALL (A) NEG      WBC 5-10 0 - 4 /hpf    RBC 0-5 0 - 5 /hpf    Epithelial cells FEW FEW /lpf    Bacteria NEGATIVE  NEG /hpf    UA:UC IF INDICATED URINE CULTURE ORDERED (A) CNI      Mucus 2+ (A) NEG /lpf    Hyaline cast 0-2 0 - 5 /lpf   BILIRUBIN, CONFIRM    Collection Time: 11/13/19  8:56 AM   Result Value Ref Range    Bilirubin UA, confirm NEGATIVE  NEG     POC LACTIC ACID    Collection Time: 11/13/19  9:03 AM   Result Value Ref Range    Lactic Acid (POC) 0.94 0.40 - 2.00 mmol/L   INFLUENZA A & B AG (RAPID TEST)    Collection Time: 11/13/19 10:09 AM   Result Value Ref Range    Influenza A Antigen NEGATIVE  NEG      Influenza B Antigen NEGATIVE  NEG

## 2019-11-13 NOTE — ED PROVIDER NOTES
EMERGENCY DEPARTMENT HISTORY AND PHYSICAL EXAM      Date: 11/13/2019  Patient Name: Marlo Camara. Patient Age and Sex: 76 y.o. male     History of Presenting Illness     Chief Complaint   Patient presents with    Fever     Fever daily for 14 days, 102.4 last night. some improvement with tylenol. Last dose at 0630 this am.  Pt has been on Cipro    Urinary Frequency     Frequency reported, last treatment for bladder CA on 10/31. History Provided By: Patient    HPI: Marlo Camara. is a 79-year-old male presenting with fever. Patient states that currently he is getting treatment for bladder cancer with BCG. Last treatment was on Halloween, October 31. Since then has been having fevers as high as 102.4 last night. Was placed on antibiotics shortly after the treatment, ciprofloxacin and he took that for 7 days. However he continued to have fevers and nausea despite this. Was then seen again by [facility] and placed on another 10 days of ciprofloxacin. Despite that has continued to have the fevers as high as 102.4. Patient denies any dysuria, hematuria but does state that his urine is darker and has been having some increased frequency. He denies any cough, diarrhea, rhinorrhea or congestion, rashes anywhere. There are no other complaints, changes, or physical findings at this time. PCP: Yue Miranda MD    No current facility-administered medications on file prior to encounter. Current Outpatient Medications on File Prior to Encounter   Medication Sig Dispense Refill    Bifidobacterium Infantis (ALIGN) 4 mg cap Take 1 Tab by mouth daily.  acetaminophen (TYLENOL EXTRA STRENGTH) 500 mg tablet Take 1,000 mg by mouth every six (6) hours as needed for Pain.  simvastatin (ZOCOR) 20 mg tablet TAKE 1 TABLET BY MOUTH EVERYDAY AT BEDTIME(NEEDS APPT) 90 Tab 3    aspirin delayed-release 81 mg tablet Take 81 mg by mouth daily.       PRENATAL VITS/IRON FUM/FOLIC (M-VIT PO) Take 1 Tab by mouth daily. Multi vit       cholecalciferol, vitamin D3, (VITAMIN D3) 2,000 unit tab Take 1 Tab by mouth daily.  timolol maleate (ISTALOL) 0.5 % drpd ophthalmic solution Administer 1 Drop to right eye daily.  travoprost (TRAVATAN Z) 0.004 % ophthalmic solution Administer 1 Drop to both eyes every evening.  cetirizine (ZYRTEC) 10 mg tablet Take 10 mg by mouth daily.          Past History     Past Medical History:  Past Medical History:   Diagnosis Date    Allergic rhinitis 1/30/14    treated at [facility]    Arthritis     HANDS    Bladder cancer ([facility] 06/02/2016    Dr. Sigifredo Gordon 8/10/16 f/u note path report too    Bladder cancer ([facility] 06/2016    Chronic kidney disease     Chronic kidney disease, stage II (mild)     Diverticula of colon 7/18/12    mia monterroso    Diverticulosis of colon     Elevated PSA 10/2011    Dr Sigifredo Gordon    Glaucoma     9/21/16 note from [address] [place] [address] Hypercholesterolemia     near syncope 10/06/15    notes from [facility] Other ill-defined conditions(799.89)     in clinical trial at [place] [address] for prostate cancer    Pneumonia     Prostate cancer ([facility] 11/2012      6/3/16 [facility] notes  [facility] 8/10/16 note    Prostatitis     Aft[place]    Prostatitis 07/2014    aftab    Tinea cruris 3/09    and rosacea    Varicose veins     Vitamin D deficiency        Past Surgical History:  Past Surgical History:   Procedure Laterality Date    ABDOMEN SURGERY PROC UNLISTED  00    umbilical hernia    CT HEART W/O CONT WITH CALCIUM  3/3/04    score 65.57    ENDOSCOPY, COLON, DIAGNOSTIC  8/06    5 yrs    HX APPENDECTOMY      HX OTHER SURGICAL  7/18/12    colonoscopy 5 year repeat    HX OTHER SURGICAL  4/2012    cyst on groin removed Dr Sigifredo Gordon    [facility]  10/21/15    echocardiogram due to syncope    HX UROLOGICAL  '90    hydrocele    HX UROLOGICAL      TURB X 3    HX UROLOGICAL  05/2017    Biopsy and tumor removed from his bladder at [facility]       Family History:  Family History   Problem Relation Age of Onset    Heart Disease Mother     Cancer Mother         lung    Asthma Mother     Diabetes Father     Thyroid Disease Sister     Cancer Sister 61        breast and throat    Stroke Paternal Grandfather     MS Sister     Cancer Sister         THYROID    Anesth Problems Neg Hx        Social History:  Social History     Tobacco Use    Smoking status: Never Smoker    Smokeless tobacco: Never Used   Substance Use Topics    Alcohol use: No    Drug use: No       Allergies: Allergies   Allergen Reactions    Nsaids (Non-Steroidal Anti-Inflammatory Drug) Other (comments)     As per physcian order due to glaucoma and CKD         Review of Systems   Review of Systems   Constitutional: Positive for fever. Negative for chills. Respiratory: Negative for cough and shortness of breath. Cardiovascular: Negative for chest pain. Gastrointestinal: Positive for nausea. Negative for abdominal pain, constipation, diarrhea and vomiting. Genitourinary: Positive for frequency. Negative for decreased urine volume, difficulty urinating, dysuria, flank pain, hematuria and urgency. Neurological: Negative for weakness and numbness. All other systems reviewed and are negative. Physical Exam   Physical Exam   Constitutional: He is oriented to person, place, and time. He appears well-developed and well-nourished. HENT:   Head: Normocephalic and atraumatic. Eyes: Conjunctivae and EOM are normal.   Neck: Normal range of motion. Neck supple. Cardiovascular: Normal rate and regular rhythm. Pulmonary/Chest: Effort normal and breath sounds normal. No respiratory distress. He has no wheezes. Abdominal: Soft. He exhibits no distension. There is no tenderness. Musculoskeletal: Normal range of motion. Neurological: He is alert and oriented to person, place, and time. Skin: Skin is warm and dry. Psychiatric: He has a normal mood and affect. Nursing note and vitals reviewed. Diagnostic Study Results     Labs -     Recent Results (from the past 12 hour(s))   METABOLIC PANEL, BASIC    Collection Time: 11/13/19  8:56 AM   Result Value Ref Range    Sodium 135 (L) 136 - 145 mmol/L    Potassium 4.0 3.5 - 5.1 mmol/L    Chloride 105 97 - 108 mmol/L    CO2 25 21 - 32 mmol/L    Anion gap 5 5 - 15 mmol/L    Glucose 99 65 - 100 mg/dL    BUN 9 6 - 20 MG/DL    Creatinine 1.14 0.70 - 1.30 MG/DL    BUN/Creatinine ratio 8 (L) 12 - 20      GFR est AA >60 >60 ml/min/1.73m2    GFR est non-AA >60 >60 ml/min/1.73m2    Calcium 8.7 8.5 - 10.1 MG/DL   CBC WITH AUTOMATED DIFF    Collection Time: 11/13/19  8:56 AM   Result Value Ref Range    WBC 4.7 4.1 - 11.1 K/uL    RBC 4.69 4.10 - 5.70 M/uL    HGB 13.4 12.1 - 17.0 g/dL    HCT 40.1 36.6 - 50.3 %    MCV 85.5 80.0 - 99.0 FL    MCH 28.6 26.0 - 34.0 PG    MCHC 33.4 30.0 - 36.5 g/dL    RDW 13.4 11.5 - 14.5 %    PLATELET 098 967 - 462 K/uL    MPV 8.5 (L) 8.9 - 12.9 FL    NRBC 0.0 0  WBC    ABSOLUTE NRBC 0.00 0.00 - 0.01 K/uL    NEUTROPHILS 78 (H) 32 - 75 %    LYMPHOCYTES 13 12 - 49 %    MONOCYTES 8 5 - 13 %    EOSINOPHILS 0 0 - 7 %    BASOPHILS 1 0 - 1 %    IMMATURE GRANULOCYTES 0 0.0 - 0.5 %    ABS. NEUTROPHILS 3.7 1.8 - 8.0 K/UL    ABS. LYMPHOCYTES 0.6 (L) 0.8 - 3.5 K/UL    ABS. MONOCYTES 0.4 0.0 - 1.0 K/UL    ABS. EOSINOPHILS 0.0 0.0 - 0.4 K/UL    ABS. BASOPHILS 0.0 0.0 - 0.1 K/UL    ABS. IMM.  GRANS. 0.0 0.00 - 0.04 K/UL    DF SMEAR SCANNED      RBC COMMENTS NORMOCYTIC, NORMOCHROMIC      WBC COMMENTS TOXIC GRANULATION     URINALYSIS W/ REFLEX CULTURE    Collection Time: 11/13/19  8:56 AM   Result Value Ref Range    Color DARK YELLOW      Appearance CLEAR CLEAR      Specific gravity 1.028 1.003 - 1.030      pH (UA) 5.0 5.0 - 8.0      Protein 30 (A) NEG mg/dL    Glucose NEGATIVE  NEG mg/dL    Ketone NEGATIVE  NEG mg/dL    Blood TRACE (A) NEG      Urobilinogen 0.2 0.2 - 1.0 EU/dL    Nitrites NEGATIVE  NEG Leukocyte Esterase SMALL (A) NEG      WBC 5-10 0 - 4 /hpf    RBC 0-5 0 - 5 /hpf    Epithelial cells FEW FEW /lpf    Bacteria NEGATIVE  NEG /hpf    UA:UC IF INDICATED URINE CULTURE ORDERED (A) CNI      Mucus 2+ (A) NEG /lpf    Hyaline cast 0-2 0 - 5 /lpf   BILIRUBIN, CONFIRM    Collection Time: 11/13/19  8:56 AM   Result Value Ref Range    Bilirubin UA, confirm NEGATIVE  NEG     HEPATIC FUNCTION PANEL    Collection Time: 11/13/19  8:56 AM   Result Value Ref Range    Protein, total 7.5 6.4 - 8.2 g/dL    Albumin 3.3 (L) 3.5 - 5.0 g/dL    Globulin 4.2 (H) 2.0 - 4.0 g/dL    A-G Ratio 0.8 (L) 1.1 - 2.2      Bilirubin, total 0.8 0.2 - 1.0 MG/DL    Bilirubin, direct 0.3 (H) 0.0 - 0.2 MG/DL    Alk. phosphatase 249 (H) 45 - 117 U/L    AST (SGOT) 30 15 - 37 U/L    ALT (SGPT) 44 12 - 78 U/L   POC LACTIC ACID    Collection Time: 11/13/19  9:03 AM   Result Value Ref Range    Lactic Acid (POC) 0.94 0.40 - 2.00 mmol/L   INFLUENZA A & B AG (RAPID TEST)    Collection Time: 11/13/19 10:09 AM   Result Value Ref Range    Influenza A Antigen NEGATIVE  NEG      Influenza B Antigen NEGATIVE  NEG         Radiologic Studies -   XR CHEST PORT   Final Result   IMPRESSION: No evidence of acute cardiopulmonary process. CT Results  (Last 48 hours)    None        CXR Results  (Last 48 hours)               11/13/19 1035  XR CHEST PORT Final result    Impression:  IMPRESSION: No evidence of acute cardiopulmonary process. Narrative:  INDICATION: Fever       COMPARISON: 5/10/2017       FINDINGS: AP portable imaging of the chest performed at 10:13 AM demonstrates a   stable cardiomediastinal silhouette. There is no new airspace disease or pleural   effusion. Minimal left basilar scarring is unchanged. No significant osseous   abnormalities are seen. Medical Decision Making   I am the first provider for this patient.     I reviewed the vital signs, available nursing notes, past medical history, past surgical history, family history and social history. Vital Signs-Reviewed the patient's vital signs. Patient Vitals for the past 12 hrs:   Temp Pulse Resp BP SpO2   11/13/19 1327 99 °F (37.2 °C)       11/13/19 1230 98.3 °F (36.8 °C) 85 18 167/89 100 %   11/13/19 1145  72 16 139/64 98 %   11/13/19 1043 98.9 °F (37.2 °C)       11/13/19 1000  66 16 119/54 97 %   11/13/19 0932    117/52    11/13/19 0836 98.9 °F (37.2 °C) 78 16 125/67 97 %       Records Reviewed: Nursing Notes and Old Medical Records    Provider Notes (Medical Decision Making):   Patient presenting with fevers at home in the setting of recent bladder cancer treatment and urinary frequency. Vitals here are normal with normal blood pressure, temperature, and pulse. We will get POC lactate, lab work including WBC and urinalysis and reassess. ED Course:   Initial assessment performed. The patients presenting problems have been discussed, and they are in agreement with the care plan formulated and outlined with them. I have encouraged them to ask questions as they arise throughout their visit. ED Course as of Nov 13 1352   Wed Nov 13, 2019   1011 Spoke with Ramya Tinoco and plan is speak with urology, get blood cultures, cxr, flu screen    [JS]   [id number] with Dr. Elmira Prado, urology who states that he knows this patient. Possibly related to the BCG treatments versus this being sepsis. Agrees with work-up for sepsis of other cause and bring him into the hospital.  Would consider getting ID involved. When asked about urine cultures at their office, he will double check, but believes that the cultures came back negative. [JS]      ED Course User Index  [JS] Daisy Peters MD         Disposition:    Admission Note:  Patient is being admitted to the hospital by Dr. Ramya Tinoco, Service: Hospitalist.  The results of their tests and reasons for their admission have been discussed with them and available family.  They convey agreement and understanding for the need to be admitted and for their admission diagnosis. Diagnosis     Clinical Impression:   1. Fever, unspecified fever cause        Attestations:  Petra Tineo M.D. Please note that this dictation was completed with [employer], the computer voice recognition software. Quite often unanticipated grammatical, syntax, homophones, and other interpretive errors are inadvertently transcribed by the computer software. Please disregard these errors. Please excuse any errors that have escaped final proofreading. Thank you.

## 2019-11-13 NOTE — PROGRESS NOTES
Pharmacy Clarification of Prior to Admission Medication Regimen     The patient was interviewed regarding clarification of the prior to admission medication regimen. Patient's wife was present in room and obtained permission from patient to discuss drug regimen with visitor(s) present. Patient was questioned regarding use of any other inhalers, topical products, over the counter medications, herbal medications, vitamin products or ophthalmic/nasal/otic medication use. Information Obtained From: RX Query, Patient    Pertinent Pharmacy Findings: None    PTA medication list was corrected to the following:     Prior to Admission Medications   Prescriptions Last Dose Informant Patient Reported? Taking? Bifidobacterium Infantis (ALIGN) 4 mg cap 11/12/2019 at Unknown time Self Yes Yes   Sig: Take 1 Tab by mouth daily. PRENATAL VITS/IRON FUM/FOLIC (M-VIT PO) 23/94/6374 at Unknown time Self Yes Yes   Sig: Take 1 Tab by mouth daily. Multi vit    acetaminophen (TYLENOL EXTRA STRENGTH) 500 mg tablet 11/13/2019 at Unknown time Self Yes Yes   Sig: Take 1,000 mg by mouth every six (6) hours as needed for Pain. aspirin delayed-release 81 mg tablet 11/12/2019 at Unknown time Self Yes Yes   Sig: Take 81 mg by mouth daily. cetirizine (ZYRTEC) 10 mg tablet 11/12/2019 at Unknown time Self Yes Yes   Sig: Take 10 mg by mouth daily. cholecalciferol, vitamin D3, (VITAMIN D3) 2,000 unit tab 11/12/2019 at Unknown time Self Yes Yes   Sig: Take 1 Tab by mouth daily. simvastatin (ZOCOR) 20 mg tablet 11/12/2019 at Unknown time Self No Yes   Sig: TAKE 1 TABLET BY MOUTH EVERYDAY AT BEDTIME(NEEDS APPT)   timolol maleate (ISTALOL) 0.5 % drpd ophthalmic solution 11/12/2019 at Unknown time Self Yes Yes   Sig: Administer 1 Drop to right eye daily. travoprost (TRAVATAN Z) 0.004 % ophthalmic solution 11/12/2019 at Unknown time Self Yes Yes   Sig: Administer 1 Drop to both eyes every evening.       Facility-Administered Medications: None          Thank you,  Trini Stack  Medication History Pharmacy Technician

## 2019-11-13 NOTE — ED NOTES
Report to [facility] on oncology. Transport paged [address].

## 2019-11-14 LAB
BACTERIA SPEC CULT: NORMAL
SERVICE CMNT-IMP: NORMAL

## 2019-11-14 PROCEDURE — 94760 N-INVAS EAR/PLS OXIMETRY 1: CPT

## 2019-11-14 PROCEDURE — 74011250636 HC RX REV CODE- 250/636: Performed by: HOSPITALIST

## 2019-11-14 PROCEDURE — 74011250637 HC RX REV CODE- 250/637: Performed by: EMERGENCY MEDICINE

## 2019-11-14 PROCEDURE — 74011250637 HC RX REV CODE- 250/637: Performed by: HOSPITALIST

## 2019-11-14 PROCEDURE — 99218 HC RM OBSERVATION: CPT

## 2019-11-14 PROCEDURE — 74011000250 HC RX REV CODE- 250: Performed by: HOSPITALIST

## 2019-11-14 PROCEDURE — 87116 MYCOBACTERIA CULTURE: CPT

## 2019-11-14 PROCEDURE — 74011000258 HC RX REV CODE- 258: Performed by: HOSPITALIST

## 2019-11-14 PROCEDURE — 65270000015 HC RM PRIVATE ONCOLOGY

## 2019-11-14 RX ADMIN — PRAVASTATIN SODIUM 40 MG: 40 TABLET ORAL at 21:01

## 2019-11-14 RX ADMIN — Medication 10 ML: at 09:06

## 2019-11-14 RX ADMIN — ACETAMINOPHEN 650 MG: 325 TABLET ORAL at 02:27

## 2019-11-14 RX ADMIN — ACETAMINOPHEN 650 MG: 325 TABLET ORAL at 10:56

## 2019-11-14 RX ADMIN — LATANOPROST 1 DROP: 50 SOLUTION OPHTHALMIC at 18:05

## 2019-11-14 RX ADMIN — CETIRIZINE HYDROCHLORIDE 10 MG: 10 TABLET, FILM COATED ORAL at 08:56

## 2019-11-14 RX ADMIN — ENOXAPARIN SODIUM 40 MG: 40 INJECTION SUBCUTANEOUS at 21:01

## 2019-11-14 RX ADMIN — CEFTRIAXONE 1 G: 1 INJECTION, POWDER, FOR SOLUTION INTRAMUSCULAR; INTRAVENOUS at 09:05

## 2019-11-14 RX ADMIN — DOCUSATE SODIUM 100 MG: 100 CAPSULE, LIQUID FILLED ORAL at 18:04

## 2019-11-14 RX ADMIN — VITAMIN D, TAB 1000IU (100/BT) 2 TABLET: 25 TAB at 08:56

## 2019-11-14 RX ADMIN — Medication 5 ML: at 21:01

## 2019-11-14 RX ADMIN — ACETAMINOPHEN 650 MG: 325 TABLET ORAL at 18:04

## 2019-11-14 RX ADMIN — ASPIRIN 81 MG: 81 TABLET, COATED ORAL at 21:01

## 2019-11-14 RX ADMIN — Medication 1 TABLET: at 08:56

## 2019-11-14 RX ADMIN — DOCUSATE SODIUM 100 MG: 100 CAPSULE, LIQUID FILLED ORAL at 09:00

## 2019-11-14 RX ADMIN — Medication 1 CAPSULE: at 08:56

## 2019-11-14 RX ADMIN — TIMOLOL MALEATE 1 DROP: 5 SOLUTION/ DROPS OPHTHALMIC at 09:05

## 2019-11-14 NOTE — PROGRESS NOTES
Bedside and Verbal shift change report given to Becky Alberts (oncoming nurse) by Phyllis Epstein RN (offgoing nurse). Report included the following information SBAR, Kardex, MAR and Recent Results.

## 2019-11-14 NOTE — PROGRESS NOTES
Problem: Knowledge Deficit  Goal: *Participate in the learning process  Outcome: Progressing Towards Goal  Goal: *Verbalize description of procedure  Outcome: Progressing Towards Goal  Goal: *Verbalizes understanding and describes medication purposes and frequencies  Outcome: Progressing Towards Goal  Goal: *Knowledge of discharge instructions  Outcome: Progressing Towards Goal  Goal: Interventions  Outcome: Progressing Towards Goal

## 2019-11-14 NOTE — PROGRESS NOTES
Hospitalist Progress Note    NAME: Marquis Haile. :  1945   MRN:  [id number]     I reviewed pertinent labs/imaging and discussed plan of care with Dr. Manfred Bailey who is in agreement. Assessment / Plan:  Fever  Urine culture 19:  NG at 1 day. Blood culture 19:  NG at 20 hours. Influenza A/B 19:  Negative. AFB culture 19:  Pending. CXR 19:  No evidence of acute cardiopulmonary process. 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 - Continue ceftriaxone 1 Gm IV q24h (day #1). - ID consult. Bladder cancer  Prostate cancer  - Patient has been receiving BCG treatments. - Patient is in a study for prostate cancer at the [place] [address]. Mild asymptomatic hyponatremia  - No tx indicated. - Monitor. Elevated Alk Phos  - Monitor. Dyslipidemia  - Continue pravastatin 40 mg po daily. Glaucoma  - Continue latanoprost 0.005% ophthalmic solution 1 gtt ou daily. - Continue timolol 0.5% ophthalmic solution 1 gtt OD daily. 30.0 - 39.9 Obese / Body mass index is 30 kg/m². Code status: Full  Prophylaxis: Lovenox  Recommended Disposition: Home w/Family     Subjective:     Chief Complaint / Reason for Physician Visit  Patient reports ongoing chills. Denies cough, GI distress, diarrhea. Plan of care discussed with RN. Review of Systems:  Symptom Y/N Comments  Symptom Y/N Comments   Fever/Chills Y   Chest Pain N    Poor Appetite N   Edema N    Cough N   Abdominal Pain N    Sputum N   Joint Pain N    SOB/OWEN N   Pruritis/Rash N    Nausea/vomit N   Tolerating PT/OT     Diarrhea N   Tolerating Diet Y    Constipation N   Other       Could NOT obtain due to:      Objective:     VITALS:   Last 24hrs VS reviewed since prior progress note.  Most recent are:  Patient Vitals for the past 24 hrs:   Temp Pulse Resp BP SpO2   19 0807 99.3 °F (37.4 °C) 80 16 144/70 99 %   19 0228 100.4 °F (38 °C) 78 17 138/71 100 %   19 2134 99.5 °F (37.5 °C)       19 (!) 102.3 °F (39.1 °C) 88 18 149/74 99 %   11/13/19 1602 99.3 °F (37.4 °C) 85 18 134/63 97 %       Intake/Output Summary (Last 24 hours) at 11/14/2019 1523  Last data filed at 11/13/2019 2321  Gross per 24 hour   Intake 400 ml   Output    Net 400 ml        PHYSICAL EXAM:  General:  A/A/O X 3. NAD. HEENT:  Normocephalic. Sclera anicteric. EOMI. Mucous membranes moist.    Chest:  Resps even/unlabored with symmetrical CWE. Air entry full. Lungs CTA. No use of accessory muscles. CV:  RRR. No peripheral edema. GI:  Abdomen soft/NT/ND. ABT X 4.    Neurologic:  Nonfocal.  CN II-XII grossly intact. Speech normal.     Psych:  Cooperative. No anxiety or agitation. Skin:  Intact. No rashes or jaundice. Reviewed most current lab test results and cultures  YES  Reviewed most current radiology test results   YES  Review and summation of old records today    NO  Reviewed patient's current orders and MAR    YES  PMH/SH reviewed - no change compared to H&P  ________________________________________________________________________  Care Plan discussed with:    Comments   Patient [address]     Consultant                        Multidiciplinary team rounds were held today with , nursing, pharmacist and clinical coordinator. Patient's plan of care was discussed; medications were reviewed and discharge planning was addressed. ____________________________________________________________________________________________________  Geremias Waterman NP     Procedures: see electronic medical records for all procedures/Xrays and details which were not copied into this note but were reviewed prior to creation of Plan. LABS:  I reviewed today's most current labs and imaging studies.   Pertinent labs include:  Recent Labs     11/13/19  0856   WBC 4.7   HGB 13.4   HCT 40.1        Recent Labs     11/13/19  0856   *   K 4.0      CO2 25   GLU 99   BUN 9 CREA 1.14   CA 8.7   ALB 3.3*   TBILI 0.8   SGOT 30   ALT 44       Signed: Sy Alonso NP

## 2019-11-14 NOTE — PROGRESS NOTES
Patient: Otf Groves. MRN: [id number]  SSN: [id number]    YOB: 1945  Age: 76 y.o. Sex: male        ADMITTED: 2019 to Ryley Germain MD by Estefanía Bishop MD for Fever [R50.9]      Otf Groves. is being treated for continued fevers, chills, night sweats s/p recent maintenance BCG treatments despite ciprofloxacin therapy since 10/31/2019. Current temp 99.3, max temp. 102.3 on 2019. WBC-4.7. BUN/Creat-wnl. On rocephin, UCX/BCX pending. Patient denies pain, is emptying his bladder w/no problems, no gross hematuria. Vitals: Temp (24hrs), Av.8 °F (37.7 °C), Min:98.3 °F (36.8 °C), Max:102.3 °F (39.1 °C)    Blood pressure 144/70, pulse 80, temperature 99.3 °F (37.4 °C), resp. rate 16, height 5' 8\" (1.727 m), weight 89.5 kg (197 lb 5 oz), SpO2 99 %. Intake and Output:   1901 -  0700  In: 450 [P.O.:400; I.V.:50]  Out: -   No intake/output data recorded. DICK Output lats 24 hrs: No data found. DICK Output last 8 hrs: No data found. BM over last 24 hrs: No data found.     Exam:   Appearance: Well-developed no acute distress  Conjunctiva: Conjunctiva and lids normal  External ears/nose: Normal no lesions or deformities  Hearing: Grossly intact  Respiratory effort: Breathing easily  Lower extremity: No edema  Abdomen/flank: Soft, nontender, without masses, no CVA tenderness  Digits/nails: No clubbing cyanosis or petechiae  Skin inspection: Warm and dry  Sensation: No sensory deficits  Judgment/Insight: intact  Mood/Affect: normal    Labs:  CBC:   Lab Results   Component Value Date/Time    WBC 4.7 2019 08:56 AM    HCT 40.1 2019 08:56 AM    PLATELET 172  08:56 AM     BMP:   Lab Results   Component Value Date/Time    Glucose 99 2019 08:56 AM    Sodium 135 (L) 2019 08:56 AM    Potassium 4.0 2019 08:56 AM    Chloride 105 2019 08:56 AM    CO2 25 2019 08:56 AM    BUN 9 2019 08:56 AM    Creatinine 1.14 11/13/2019 08:56 AM    Calcium 8.7 11/13/2019 08:56 AM     Cultures: UCX/BCX pending    Assessment/Plan:     1. continued fevers despite ciprofloxacin therapy since 10/31/19,   2. concern for BCG sepsis.     -ID consulted, appreciate assistance with recommendations on BCG sepsis workup, possible anti-tuberculoid medications and timing of those meds if indicated (isoniazid, rifampin, ethambutol, etc)  -UCX/BCX pending, following  -Will not be a candidate for BCG treatments in the future based on this response          Signed By: Wally Khan NP - November 14, 2019

## 2019-11-14 NOTE — PROGRESS NOTES
JESSIKA:   1) Home with home health and f.u appts   2) Pt will need 2ND IM letter at time of discharge   3) Pt's wife will drive pt home at time of discharge     Reason for Admission: Fever                   RRAT Score: 17 MODERATE              Do you (patient/family) have any concerns for transition/discharge? No questions or concerns at this time                  Plan for utilizing home health: Per recommendation pending pt's progression during hospitalization stay    Current Advanced Directive/Advance Care Plan:  None on file, pt not interested in arranging any at this time             Transition of Care Plan:        Pt is a 76year old,  male, admitted with fever. Pt was alert and oriented when meeting with wife and CM, confirming address, emergency contact and PCP. Pt states he lives with his wife in a two level home, 4 steps to enter and 16 to get upstairs. Pt has no dme and drives at baseline. Pt has not had HH in the past and has not been to rehab. Pt's preferred pharmacy is the [facility], pt states no problems affording or accessing medications, secondary insurance to assist. Pt's wife will drive pt home at time of discharge and as needed. Pt's wife states they have a very strong family support system to assist as needed as well. Pt is currently being upgraded to inpatient status, he currently has prostate cancer as well. CM will continue to follow pt and remain available for support. Care Management Interventions  PCP Verified by CM:  Yes  Mode of Transport at Discharge: Self  Transition of Care Consult (CM Consult): Discharge Planning  MyChart Signup: No  Discharge Durable Medical Equipment: No  Physical Therapy Consult: No  Occupational Therapy Consult: No  Speech Therapy Consult: No  Current Support Network: Lives with Spouse  Confirm Follow Up Transport: Family  Plan discussed with Pt/Family/Caregiver: Yes  Freedom of Choice Offered: Yes  Discharge Location  Discharge Placement: Home with home health     Mana Adam, [place], [address]   [phone]

## 2019-11-14 NOTE — PROGRESS NOTES
Initial Nutrition Assessment:    INTERVENTIONS/RECOMMENDATIONS:   · Regular diet   · Please document % meals and supplements consumed in flowsheet I/O's under intake     ASSESSMENT:   Chart reviewed, medically noted for fevers, chills, night sweats s/p recent maintenance BCG treatments, prostate cancer, h/o bladder cancer and PMH shown below. Nutrition referral triggered due to MST score. Pt reports poor PO intake when he first developed fever which led to a 9 lbs weight loss x 1 week. After that first week he was forcing himself to eat. Prior to this fever his appetite was good without weight loss. His current appetite is fair. He has no obvious signs of muscle or fat wasting. Past Medical History:   Diagnosis Date    All[facility] rhinitis 1/30/14    [facility]    Arthritis     HANDS    Bladder cancer ([facility] 06/02/2016    Dr. Kiki Woodard 8/10/16 f/u note path report too    Bladder cancer ([facility] [address] [facility] 06/2016    Chronic kidney disease     Chronic kidney disease, stage II (mild)     Diverticula of colon 7/18/12    mia monterroso    Diverticulosis of colon     Elevated PSA 10/2011    Dr Kiki Woodard    Glaucoma     9/21/16 note from [address] [place] [address] Hypercholesterolemia     near syncope 10/06/15    notes from [facility] Other ill-defined conditions(799.89)     in clinical trial at [place] [address] for prostate cancer    Pneumonia     Prostate cancer ([facility] [address] [facility] 11/2012      6/3/16 [facility] notes  [facility] 8/10/16 note    Prostatitis     Aftab    Prostatitis 07/2014    aftab    Tinea cruris 3/09    and rosacea    Varicose veins     Vitamin D deficiency        Diet Order: Cardiac  % Eaten:  No data found.   Pertinent Medications: [x]Reviewed: vit D3, colace, lactobac,   Pertinent Labs: [x]Reviewed:   Food Allergies: [x]NKFA  []Other   Last BM: 11/14  Edema:  n/a      []RUE   []LUE   []RLE   []LLE      Pressure Injury:  n/a    [] Stage I   [] Stage II   [] Stage III   [] Stage IV      Wt Readings from Last 30 Encounters:   11/13/19 89.5 kg (197 lb 5 oz)   03/06/19 93.8 kg (206 lb 14.4 oz)   08/30/17 89.4 kg (197 lb 1.6 oz)   06/14/17 90.5 kg (199 lb 9.6 oz)   05/22/17 90.7 kg (200 lb)   05/10/17 90.7 kg (200 lb)   08/26/16 91.9 kg (202 lb 11.2 oz)   10/09/15 92.9 kg (204 lb 12.9 oz)   02/04/15 92.1 kg (203 lb 0.1 oz)   08/21/14 93 kg (205 lb)   02/02/14 88.5 kg (195 lb)   12/13/13 90.3 kg (199 lb 1.6 oz)   10/05/12 91 kg (200 lb 9.6 oz)   10/03/11 92.3 kg (203 lb 6.4 oz)   09/30/10 87.6 kg (193 lb 3.2 oz)   07/22/10 90.2 kg (198 lb 12.8 oz)   01/18/10 90.3 kg (199 lb)   09/30/09 88.5 kg (195 lb 3.2 oz)       Anthropometrics:   Height: 5' 8\" (172.7 cm) Weight: 89.5 kg (197 lb 5 oz)   IBW (%IBW):   ( ) UBW (%UBW):   (  %)   Last Weight Metrics:  Weight Loss Metrics 11/13/2019 3/6/2019 8/30/2017 6/14/2017 5/22/2017 5/10/2017 8/26/2016   Today's Wt 197 lb 5 oz 206 lb 14.4 oz 197 lb 1.6 oz 199 lb 9.6 oz 200 lb 200 lb 202 lb 11.2 oz   BMI 30 kg/m2 32.41 kg/m2 30.87 kg/m2 30.35 kg/m2 30.41 kg/m2 30.41 kg/m2 31.75 kg/m2       BMI: Body mass index is 30 kg/m². This BMI is indicative of:   []Underweight    []Normal    []Overweight    [x] Obesity   [] Extreme Obesity (BMI>40)     Estimated Nutrition Needs (Based on):   1925 Kcals/day(BMR: 1600 x 1.2) , 90 g(1 g/kg) Protein  Carbohydrate:  At Least 130 g/day  Fluids: 1925 mL/day (1ml/kcal) or per primary team    NUTRITION DIAGNOSES:   Problem:  Unintended weight loss      Etiology: related to decreased appetite caused by fever     Signs/Symptoms: as evidenced by 9 lbs weight loss in 1 week      NUTRITION INTERVENTIONS:  Meals/Snacks: General/healthful diet                  GOAL:   consume >50% of meals in 3-5 days    LEARNING NEEDS (Diet, Food/Nutrient-Drug Interaction):    [x] None Identified   [] Identified and Education Provided/Documented   [] Identified and Pt declined/was not appropriate     Cultureal, Oriental orthodox, OR Ethnic Dietary Needs:    [x] None Identified   [] Identified and Addressed     [x] Interdisciplinary Care Plan Reviewed/Documented    [x] Discharge Planning: General healthy diet      MONITORING /EVALUATION:      Food/Nutrient Intake Outcomes:  Total energy intake  Physical Signs/Symptoms Outcomes: Weight/weight change, Electrolyte and renal profile, GI    NUTRITION RISK:    [] High              [x] Moderate           []  Low  []  Minimal/Uncompromised    PT SEEN FOR:    []  MD Consult: []Calorie Count      []Diabetic Diet Education        []Diet Education     []Electrolyte Management     []General Nutrition Management and Supplements     []Management of Tube Feeding     []TPN Recommendations    [x]  RN Referral:  [x]MST score >=2     []Enteral/Parenteral Nutrition PTA     []Pregnant: Gestational DM or Multigestation     []Pressure Ulcer/Wound Care needs        []  Low BMI  []  LOS Referral       Israel Guzman RDN  Pager [phone]  Weekend Pager [phone]

## 2019-11-14 NOTE — CONSULTS
New Urology Consult Note    Service Providing Consult: Urology    Assessment:    Marquis Vu is a 76 y.o. male with fevers, chills, night sweats s/p recent maintenance BCG treatments with a reported history of difficult catheterization and possible gross hematuria. Continued fevers despite ciprofloxacin therapy since 10/31/19, concerning for BCG sepsis. Recommendations:  -- Recommend admission for further workup, hydration, antipyretics  -- Recommend ID consultation for assistance with recommendations on BCG sepsis workup, possible anti-tuberculoid medications and timing of those meds if indicated (isoniazid, rifampin, ethambutol, etc)  -- AFB urine testing, ordered  -- Will not be a candidate for BCG treatments in the future based on this response    Thank you for this consult. Please contact [facility] with any further questions/concerns. We'll continue to follow. Puneet Quinones MD      HPI -     Reason for Consult:  Fever, recent BCG treatments    Marquis Vu is seen in consultation for reasons noted above at the request of Arcelia Rasmussen MD.    This is a 76 y.o. male with a history of non muscle invasive bladder cancer s/p maintenance BCG late October 2019, prostate cancer, known to Dr. Gretchen Broderick of [facility]. Also with CKD II, diverticulosis, HLD. He has been seen twice in the urgent care clinic in the last 2 weeks with fever, chills & night sweats s/p last BCG treatment on 10/30/19 which apparently had a difficult catheterization and possible gross hematuria. We have treated him with ciprofloxacin. Urine cultures from 11/1/19 and 11/11/19 were both <10,000 CFU/mL of mixed urogenital monet. He presented to [id number] [facility] today with a fever last evening of 102.4 despite being on ciprofloxacin. Here is AFVSS in the ER. Upon writing this note I see that he just had another fever to 102.3 on the floor. WBC 4.7. Lactic acid 0.94. Cr 1.14.      Urine shows small leuk esterase, 5-10 WBC/HPF, 0-5 RBC/HPF, no bacteria. Urine and blood cultures from here are pending. CXR normal.     Patient denies dysuria, gross hematuria, nausea, emesis, back/flank/abdominal pain. Major complaints/symptoms are continued fevers, chills, night sweats.        Past Medical History:  Past Medical History:   Diagnosis Date    [facility]    [facility]    Arthritis     HANDS    Bladder cancer ([facility] 06/02/2016    Dr. Ernestina Davies 8/10/16 f/u note path report too    Bladder cancer ([facility] [address] [address] 06/2016    Chronic kidney disease     Chronic kidney disease, stage II (mild)     Diverticula of colon 7/18/12    mia monterroso    Diverticulosis of colon     Elevated PSA 10/2011    Dr Ernestina Davies    Glaucoma     9/21/16 note from [address] [place] [address] Hypercholesterolemia     near syncope 10/06/15    notes from [facility] Other ill-defined conditions(799.89)     in clinical trial at [place] [address] for prostate cancer    Pneumonia     Prostate cancer ([facility] [address] 11/2012      6/3/16 [facility] notes  [facility] 8/10/16 note    Prostatitis     Ludeman    Prostatitis 07/2014    ludeman    Tinea cruris 3/09    and rosacea    Varicose veins     Vitamin D deficiency        Past Surgical History:   Past Surgical History:   Procedure Laterality Date    ABDOMEN SURGERY PROC UNLISTED  00    umbilical hernia    CT HEART W/O CONT WITH CALCIUM  3/3/04    score 65.57    ENDOSCOPY, COLON, DIAGNOSTIC  8/06    5 yrs    HX APPENDECTOMY      HX OTHER SURGICAL  7/18/12    colonoscopy 5 year repeat    HX OTHER SURGICAL  4/2012    cyst on groin removed Dr Ernestina Davies    [facility]  10/21/15    echocardiogram due to syncope    HX UROLOGICAL  '90    hydrocele    HX UROLOGICAL      TURB X 3    HX UROLOGICAL  05/2017    Biopsy and tumor removed from his bladder at [facility]       Medication:  Current Facility-Administered Medications   Medication Dose Route Frequency Provider Last Rate Last Dose    acetaminophen (TYLENOL) tablet 650 mg  650 mg Oral Q6H PRN Lorenza Vernon MD   650 mg at 11/13/19 2013    sodium chloride (NS) flush 5-40 mL  5-40 mL IntraVENous Q8H Jake Vega MD   10 mL at 11/13/19 1356    sodium chloride (NS) flush 5-40 mL  5-40 mL IntraVENous PRN Jake Vega MD        ondansetron [facility]) injection 4 mg  4 mg IntraVENous Q6H PRN Jake Vega MD        docusate sodium (COLACE) capsule 100 mg  100 mg Oral BID Jake Vega MD        enoxaparin (LOVENOX) injection 40 mg  40 mg SubCUTAneous Q24H Jake Vega MD   40 mg at 11/13/19 2013    [START ON 11/14/2019] cetirizine (ZYRTEC) tablet 10 mg  10 mg Oral DAILY Jake Vega MD        [START ON 11/14/2019] cholecalciferol (VITAMIN D3) (1000 Units /25 mcg) tablet 2 Tab  2,000 Units Oral DAILY Jake Vega MD        [START ON 11/14/2019] prenatal vit-iron fumarate-fa (PRENATAL PLUS with IRON) tablet 1 Tab  1 Tab Oral DAILY Jake Vega MD        pravastatin (PRAVACHOL) tablet 40 mg  40 mg Oral QHS Jake Vega MD        timolol (TIMOPTIC) 0.5 % ophthalmic solution 1 Drop  1 Drop Right Eye DAILY Jake Vega MD   Stopped at 11/13/19 1300    latanoprost (XALATAN) 0.005 % ophthalmic solution 1 Drop  1 Drop Both Eyes QPM Jake Vega MD   1 Drop at 11/13/19 1716    melatonin tablet 3 mg  3 mg Oral QHS PRN Jake Vega MD        [START ON 11/14/2019] cefTRIAXone (ROCEPHIN) 1 g in 0.9% sodium chloride (MBP/ADV) 50 mL  1 g IntraVENous Q24H Jake Vega MD        [START ON 11/14/2019] aspirin delayed-release tablet 81 mg  81 mg Oral QHS Jake Vega MD        lactobac ac& pc-s.therm-b.anim (EMILY Q/RISAQUAD)  1 Cap Oral DAILY Jake Vega MD   1 Cap at 11/13/19 1356    influenza vaccine 2019-20 (6 mos+)(PF) (FLUARIX/FLULAVAL/FLUZONE QUAD) injection 0.5 mL  0.5 mL IntraMUSCular PRIOR TO DISCHARGE Jake Vega MD           Allergies:   Allergies   Allergen Reactions    Nsaids (Non-Steroidal Anti-Inflammatory Drug) Other (comments)     As per physcian order due to glaucoma and CKD       Social History:  Social History     Tobacco Use    Smoking status: Never Smoker    Smokeless tobacco: Never Used   Substance Use Topics    Alcohol use: No    Drug use: No       Family History  Family History   Problem Relation Age of Onset    Heart Disease Mother     Cancer Mother         lung    Asthma Mother     Diabetes Father     Thyroid Disease Sister     Cancer Sister 61        breast and throat    Stroke Paternal Grandfather     MS Sister     Cancer Sister         THYROID    Anesth Problems Neg Hx        Review of Systems  10 systems were reviewed and are negative except as noted specifically in the HPI. Objective     Vital signs in last 24 hours:  Visit Vitals  /74   Pulse 88   Temp (!) 102.3 °F (39.1 °C)   Resp 18   Ht 5' 8\" (1.727 m)   Wt 89.5 kg (197 lb 5 oz)   SpO2 99%   BMI 30.00 kg/m²       Intake/Output last 3 shifts:  Date 11/12/19 1900 - 11/13/19 0659(Not Admitted) 11/13/19 0700 - 11/14/19 0659   Shift 0233-9517 24 Hour Total 4422-4218 4049-9680 24 Hour Total   INTAKE   P.O.    150 150     P. O.    150 150   I. V.   50(0)  50     Volume (cefTRIAXone (ROCEPHIN) 1 g in 0.9% sodium chloride (MBP/ADV) 50 mL)   50  50   Shift Total(mL/kg)   50(0.6) 150(1.7) 200(2.2)   OUTPUT   Shift Total(mL/kg)        NET   50 150 200   Weight (kg)   89.5 89.5 89.5           Physical Exam  General: NAD, A&O, resting, appropriate  HEENT: NC/AT, EOMI, MMM  Pulmonary: Normal work of breathing on RA  Cardiovascular: Regular rate & rhythm, HDS, adequate peripheral perfusion  Abdomen: soft, NTTP, nondistended, no suprapubic fullness or tenderness  : voiding spontaneously, no CVA tenderness  Extremities: warm and well perfused, no edema  Neuro: Appropriate, no focal neurological deficits    Most Recent Labs:  Lab Results   Component Value Date/Time    WBC 4.7 11/13/2019 08:56 AM    HGB 13.4 11/13/2019 08:56 AM    HCT 40.1 11/13/2019 08:56 AM    PLATELET 034 18/91/6294 08:56 AM    MCV 85.5 11/13/2019 08:56 AM        Lab Results   Component Value Date/Time    Sodium 135 (L) 11/13/2019 08:56 AM    Potassium 4.0 11/13/2019 08:56 AM    Chloride 105 11/13/2019 08:56 AM    CO2 25 11/13/2019 08:56 AM    Anion gap 5 11/13/2019 08:56 AM    Glucose 99 11/13/2019 08:56 AM    BUN 9 11/13/2019 08:56 AM    Creatinine 1.14 11/13/2019 08:56 AM    BUN/Creatinine ratio 8 (L) 11/13/2019 08:56 AM    GFR est AA >60 11/13/2019 08:56 AM    GFR est non-AA >60 11/13/2019 08:56 AM    Calcium 8.7 11/13/2019 08:56 AM    Bilirubin, total 0.8 11/13/2019 08:56 AM    AST (SGOT) 30 11/13/2019 08:56 AM    Alk.  phosphatase 249 (H) 11/13/2019 08:56 AM    Protein, total 7.5 11/13/2019 08:56 AM    Albumin 3.3 (L) 11/13/2019 08:56 AM    Globulin 4.2 (H) 11/13/2019 08:56 AM    A-G Ratio 0.8 (L) 11/13/2019 08:56 AM    ALT (SGPT) 44 11/13/2019 08:56 AM        Lab Results   Component Value Date/Time    Prostate Specific Ag 8.5 (H) 12/19/2018 08:11 AM    Prostate Specific Ag 4.4 (H) 10/05/2012 08:59 AM    Prostate Specific Ag 5.2 (H) 10/03/2011 09:02 AM    PSA, External 6.10 06/03/2016        COAGS:  No results found for: APTT, PTP, INR, INREXT    No results found for: HBA1C, HGBE8, ESZ9ZHTA, GVX0YKGM     Lab Results   Component Value Date/Time    CK 86 02/02/2014 04:10 PM    CK - MB 0.8 02/02/2014 04:10 PM    CK-MB Index 0.9 02/02/2014 04:10 PM    Troponin-I, Qt. <0.04 10/09/2015 06:22 PM          Urine/Blood Cultures:  Results     Procedure Component Value Units Date/Time    INFLUENZA A & B AG (RAPID TEST) [[id number]] Collected:  11/13/19 1009    Order Status:  Completed Specimen:  Nasopharyngeal from Nasal washing Updated:  11/13/19 1051     Influenza A Antigen NEGATIVE         Influenza B Antigen NEGATIVE        CULTURE, BLOOD, PAIRED [[id number]] Collected:  11/13/19 1009    Order Status:  Completed Specimen:  Blood Updated:  11/13/19 1034    CULTURE, URINE [[id number]] Collected:  11/13/19 9506    Order Status:  Completed Updated:  11/13/19 1609            Urine/Blood Cultures:  Results     Procedure Component Value Units Date/Time    INFLUENZA A & B AG (RAPID TEST) [[id number]] Collected:  11/13/19 1009    Order Status:  Completed Specimen:  Nasopharyngeal from Nasal washing Updated:  11/13/19 1051     Influenza A Antigen NEGATIVE         Influenza B Antigen NEGATIVE        CULTURE, BLOOD, PAIRED [[id number]] Collected:  11/13/19 1009    Order Status:  Completed Specimen:  Blood Updated:  11/13/19 1034    CULTURE, URINE [[id number]] Collected:  11/13/19 0856    Order Status:  Completed Updated:  11/13/19 1609             IMAGING:  Xr Chest Port    Result Date: 11/13/2019  INDICATION: Fever COMPARISON: 5/10/2017 FINDINGS: AP portable imaging of the chest performed at 10:13 AM demonstrates a stable cardiomediastinal silhouette. There is no new airspace disease or pleural effusion. Minimal left basilar scarring is unchanged. No significant osseous abnormalities are seen. IMPRESSION: No evidence of acute cardiopulmonary process.

## 2019-11-14 NOTE — PROGRESS NOTES
Bedside and Verbal shift change report given to Josefa Franco (oncoming nurse) by Erma Maciel RN (offgoing nurse). Report included the following information SBAR, Kardex, MAR and Recent Results.

## 2019-11-15 LAB
ALBUMIN SERPL-MCNC: 2.9 G/DL (ref 3.5–5)
ALBUMIN/GLOB SERPL: 0.7 {RATIO} (ref 1.1–2.2)
ALP SERPL-CCNC: 228 U/L (ref 45–117)
ALT SERPL-CCNC: 34 U/L (ref 12–78)
ANION GAP SERPL CALC-SCNC: 8 MMOL/L (ref 5–15)
AST SERPL-CCNC: 32 U/L (ref 15–37)
BASOPHILS # BLD: 0 K/UL (ref 0–0.1)
BASOPHILS NFR BLD: 1 % (ref 0–1)
BILIRUB SERPL-MCNC: 1 MG/DL (ref 0.2–1)
BUN SERPL-MCNC: 13 MG/DL (ref 6–20)
BUN/CREAT SERPL: 14 (ref 12–20)
CALCIUM SERPL-MCNC: 8.2 MG/DL (ref 8.5–10.1)
CHLORIDE SERPL-SCNC: 104 MMOL/L (ref 97–108)
CO2 SERPL-SCNC: 21 MMOL/L (ref 21–32)
CREAT SERPL-MCNC: 0.96 MG/DL (ref 0.7–1.3)
DIFFERENTIAL METHOD BLD: ABNORMAL
EOSINOPHIL # BLD: 0 K/UL (ref 0–0.4)
EOSINOPHIL NFR BLD: 0 % (ref 0–7)
ERYTHROCYTE [DISTWIDTH] IN BLOOD BY AUTOMATED COUNT: 13.2 % (ref 11.5–14.5)
GLOBULIN SER CALC-MCNC: 4.1 G/DL (ref 2–4)
GLUCOSE SERPL-MCNC: 90 MG/DL (ref 65–100)
HCT VFR BLD AUTO: 38 % (ref 36.6–50.3)
HGB BLD-MCNC: 12.6 G/DL (ref 12.1–17)
IMM GRANULOCYTES # BLD AUTO: 0 K/UL (ref 0–0.04)
IMM GRANULOCYTES NFR BLD AUTO: 0 % (ref 0–0.5)
LYMPHOCYTES # BLD: 0.7 K/UL (ref 0.8–3.5)
LYMPHOCYTES NFR BLD: 21 % (ref 12–49)
MCH RBC QN AUTO: 28.8 PG (ref 26–34)
MCHC RBC AUTO-ENTMCNC: 33.2 G/DL (ref 30–36.5)
MCV RBC AUTO: 87 FL (ref 80–99)
MONOCYTES # BLD: 0.3 K/UL (ref 0–1)
MONOCYTES NFR BLD: 9 % (ref 5–13)
NEUTS SEG # BLD: 2.3 K/UL (ref 1.8–8)
NEUTS SEG NFR BLD: 69 % (ref 32–75)
NRBC # BLD: 0 K/UL (ref 0–0.01)
NRBC BLD-RTO: 0 PER 100 WBC
PLATELET # BLD AUTO: 218 K/UL (ref 150–400)
PMV BLD AUTO: 8.5 FL (ref 8.9–12.9)
POTASSIUM SERPL-SCNC: 3.8 MMOL/L (ref 3.5–5.1)
PROT SERPL-MCNC: 7 G/DL (ref 6.4–8.2)
RBC # BLD AUTO: 4.37 M/UL (ref 4.1–5.7)
RBC MORPH BLD: ABNORMAL
SODIUM SERPL-SCNC: 133 MMOL/L (ref 136–145)
WBC # BLD AUTO: 3.3 K/UL (ref 4.1–11.1)

## 2019-11-15 PROCEDURE — 85025 COMPLETE CBC W/AUTO DIFF WBC: CPT

## 2019-11-15 PROCEDURE — 74011250636 HC RX REV CODE- 250/636: Performed by: HOSPITALIST

## 2019-11-15 PROCEDURE — 65270000015 HC RM PRIVATE ONCOLOGY

## 2019-11-15 PROCEDURE — 80053 COMPREHEN METABOLIC PANEL: CPT

## 2019-11-15 PROCEDURE — 74011250637 HC RX REV CODE- 250/637: Performed by: EMERGENCY MEDICINE

## 2019-11-15 PROCEDURE — 94760 N-INVAS EAR/PLS OXIMETRY 1: CPT

## 2019-11-15 PROCEDURE — 74011000258 HC RX REV CODE- 258: Performed by: HOSPITALIST

## 2019-11-15 PROCEDURE — 74011250637 HC RX REV CODE- 250/637: Performed by: HOSPITALIST

## 2019-11-15 PROCEDURE — 36415 COLL VENOUS BLD VENIPUNCTURE: CPT

## 2019-11-15 RX ADMIN — Medication 5 ML: at 06:24

## 2019-11-15 RX ADMIN — VITAMIN D, TAB 1000IU (100/BT) 2 TABLET: 25 TAB at 08:23

## 2019-11-15 RX ADMIN — ASPIRIN 81 MG: 81 TABLET, COATED ORAL at 22:25

## 2019-11-15 RX ADMIN — Medication 10 ML: at 17:35

## 2019-11-15 RX ADMIN — Medication 1 CAPSULE: at 08:23

## 2019-11-15 RX ADMIN — PRAVASTATIN SODIUM 40 MG: 40 TABLET ORAL at 22:25

## 2019-11-15 RX ADMIN — Medication 10 ML: at 22:26

## 2019-11-15 RX ADMIN — DOCUSATE SODIUM 100 MG: 100 CAPSULE, LIQUID FILLED ORAL at 08:22

## 2019-11-15 RX ADMIN — TIMOLOL MALEATE 1 DROP: 5 SOLUTION/ DROPS OPHTHALMIC at 08:24

## 2019-11-15 RX ADMIN — LATANOPROST 1 DROP: 50 SOLUTION OPHTHALMIC at 18:07

## 2019-11-15 RX ADMIN — ENOXAPARIN SODIUM 40 MG: 40 INJECTION SUBCUTANEOUS at 22:25

## 2019-11-15 RX ADMIN — CETIRIZINE HYDROCHLORIDE 10 MG: 10 TABLET, FILM COATED ORAL at 08:22

## 2019-11-15 RX ADMIN — ACETAMINOPHEN 650 MG: 325 TABLET ORAL at 10:07

## 2019-11-15 RX ADMIN — CEFTRIAXONE 1 G: 1 INJECTION, POWDER, FOR SOLUTION INTRAMUSCULAR; INTRAVENOUS at 10:00

## 2019-11-15 RX ADMIN — Medication 1 TABLET: at 08:23

## 2019-11-15 RX ADMIN — DOCUSATE SODIUM 100 MG: 100 CAPSULE, LIQUID FILLED ORAL at 17:35

## 2019-11-15 RX ADMIN — ACETAMINOPHEN 650 MG: 325 TABLET ORAL at 18:07

## 2019-11-15 NOTE — PROGRESS NOTES
Paged ID MD on call to confirm consult since consult order was placed 2 days ago. Awaiting return page.

## 2019-11-15 NOTE — PROGRESS NOTES
Visit charted 11/15/19  Of visit in Oncology unit at [facility] on 11/14/19. Documented by  .  Celeste Coffey MDiv   For  Assistance Page [phone])

## 2019-11-15 NOTE — PROGRESS NOTES
Problem: Knowledge Deficit  Goal: *Participate in the learning process  Outcome: Progressing Towards Goal  Goal: *Verbalize description of procedure  Outcome: Progressing Towards Goal  Goal: *Verbalizes understanding and describes medication purposes and frequencies  Outcome: Progressing Towards Goal  Goal: *Knowledge of discharge instructions  Outcome: Progressing Towards Goal  Goal: Interventions  Outcome: Progressing Towards Goal     Problem: Patient Education: Go to Patient Education Activity  Goal: Patient/Family Education  Outcome: Progressing Towards Goal     Problem: General Infection Care Plan (Adult and Pediatric)  Goal: Improvement in signs and symptoms of infection  Outcome: Progressing Towards Goal  Goal: *Optimize nutritional status  Outcome: Progressing Towards Goal     Problem: Patient Education: Go to Patient Education Activity  Goal: Patient/Family Education  Outcome: Progressing Towards Goal     Problem: Falls - Risk of  Goal: *Absence of Falls  Description  Document Devere [place] Fall Risk and appropriate interventions in the flowsheet.   Outcome: Progressing Towards Goal  Note:   Fall Risk Interventions:            Medication Interventions: Teach patient to arise slowly                   Problem: Patient Education: Go to Patient Education Activity  Goal: Patient/Family Education  Outcome: Progressing Towards Goal

## 2019-11-15 NOTE — PROGRESS NOTES
Hospitalist Progress Note    NAME: Jw Pacheco. :  1945   MRN:  [id number]     I reviewed pertinent labs/imaging and discussed plan of care with Dr. Kris Lindsey who is in agreement. Assessment / Plan:  Fever  Leucopenia   Urine culture 19:  NG at 1 day. Blood culture 19:  NG at 2 days. Influenza A/B 19:  Negative. AFB culture 19:  Pending. CXR 19:  No evidence of acute cardiopulmonary process. +  - Continues to have low grade fever.  - NP cough. 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 - Continue ceftriaxone 1 Gm IV q24h (day #2). - Repeat CXR. - Awaiting ID input. Bladder cancer  Prostate cancer  - Patient has been receiving BCG treatments. - Patient is in a study for prostate cancer at the [place] [address]. Mild protein calorie malnutrition  - Nutrition consult. Mild asymptomatic hyponatremia  - No tx indicated. - Monitor. Elevated Alk Phos  - Trending down. - Monitor. Dyslipidemia  - Continue pravastatin 40 mg po daily. Glaucoma  - Continue latanoprost 0.005% ophthalmic solution 1 gtt ou daily. - Continue timolol 0.5% ophthalmic solution 1 gtt OD daily. 30.0 - 39.9 Obese / Body mass index is 30 kg/m². Code status: Full  Prophylaxis: Lovenox  Recommended Disposition: Home w/Family     Subjective:     Chief Complaint / Reason for Physician Visit  Patient reports ongoing chills. Late in day he reports NP cough. No GI distress or diarrhea. Plan of care discussed with RN. Review of Systems:  Symptom Y/N Comments  Symptom Y/N Comments   Fever/Chills Y   Chest Pain N    Poor Appetite N   Edema N    Cough N   Abdominal Pain N    Sputum N   Joint Pain N    SOB/OWEN N   Pruritis/Rash N    Nausea/vomit N   Tolerating PT/OT     Diarrhea N   Tolerating Diet Y    Constipation N   Other       Could NOT obtain due to:      Objective:     VITALS:   Last 24hrs VS reviewed since prior progress note.  Most recent are:  Patient Vitals for the past 24 hrs:   Temp Pulse Resp BP SpO2   11/15/19 1006 99.4 °F (37.4 °C)       11/15/19 0838 98.7 °F (37.1 °C) 81 16 106/52 94 %   11/15/19 0023 98.7 °F (37.1 °C) 76 16 129/59 98 %   11/14/19 1927 99.7 °F (37.6 °C) 86 16 127/57 94 %   11/14/19 1707 (!) 100.9 °F (38.3 °C) 84 16 166/62 99 %   11/14/19 1617 98.9 °F (37.2 °C) 77 16 145/74 97 %     No intake or output data in the 24 hours ending 11/15/19 1043     PHYSICAL EXAM:  General:  A/A/O X 3. NAD. HEENT:  Normocephalic. Sclera anicteric. EOMI. Mucous membranes moist.   Chest:  Resps even/unlabored with symmetrical CWE. Air entry full. Lungs CTA. No use of accessory muscles. CV:  RRR. No peripheral edema. GI:  Abdomen soft/NT/ND. ABT X 4.    Neurologic:  Nonfocal.  CN II-XII grossly intact. Speech normal.     Psych:  Cooperative. No anxiety or agitation. Skin:  Intact. No rashes or jaundice. Reviewed most current lab test results and cultures  YES  Reviewed most current radiology test results   YES  Review and summation of old records today    NO  Reviewed patient's current orders and MAR    YES  PMH/ reviewed - no change compared to H&P  ________________________________________________________________________  Care Plan discussed with:    Comments   Patient [address]     Consultant                        Multidiciplinary team rounds were held today with , nursing, pharmacist and clinical coordinator. Patient's plan of care was discussed; medications were reviewed and discharge planning was addressed. ____________________________________________________________________________________________________  Geremias Waterman NP     Procedures: see electronic medical records for all procedures/Xrays and details which were not copied into this note but were reviewed prior to creation of Plan. LABS:  I reviewed today's most current labs and imaging studies.   Pertinent labs include:  Recent Labs 11/15/19  0624 11/13/19  0856   WBC 3.3* 4.7   HGB 12.6 13.4   HCT 38.0 40.1    239     Recent Labs     11/15/19  0624 11/13/19  0856   * 135*   K 3.8 4.0    105   CO2 21 25   GLU 90 99   BUN 13 9   CREA 0.96 1.14   CA 8.2* 8.7   ALB 2.9* 3.3*   TBILI 1.0 0.8   SGOT 32 30   ALT 34 44       Signed: Dagmar Zamarripa, NP

## 2019-11-16 ENCOUNTER — APPOINTMENT (OUTPATIENT)
Dept: CT IMAGING | Age: 74
DRG: 864 | End: 2019-11-16
Attending: NURSE PRACTITIONER
Payer: COMMERCIAL

## 2019-11-16 ENCOUNTER — APPOINTMENT (OUTPATIENT)
Dept: GENERAL RADIOLOGY | Age: 74
DRG: 864 | End: 2019-11-16
Attending: NURSE PRACTITIONER
Payer: COMMERCIAL

## 2019-11-16 LAB
ALBUMIN SERPL-MCNC: 2.9 G/DL (ref 3.5–5)
ALBUMIN/GLOB SERPL: 0.7 {RATIO} (ref 1.1–2.2)
ALP SERPL-CCNC: 250 U/L (ref 45–117)
ALT SERPL-CCNC: 38 U/L (ref 12–78)
ANION GAP SERPL CALC-SCNC: 6 MMOL/L (ref 5–15)
AST SERPL-CCNC: 46 U/L (ref 15–37)
BASOPHILS # BLD: 0 K/UL (ref 0–0.1)
BASOPHILS NFR BLD: 1 % (ref 0–1)
BILIRUB SERPL-MCNC: 0.7 MG/DL (ref 0.2–1)
BUN SERPL-MCNC: 12 MG/DL (ref 6–20)
BUN/CREAT SERPL: 12 (ref 12–20)
CALCIUM SERPL-MCNC: 8.5 MG/DL (ref 8.5–10.1)
CHLORIDE SERPL-SCNC: 104 MMOL/L (ref 97–108)
CO2 SERPL-SCNC: 23 MMOL/L (ref 21–32)
CREAT SERPL-MCNC: 1.04 MG/DL (ref 0.7–1.3)
CRP SERPL-MCNC: 5.57 MG/DL (ref 0–0.6)
DIFFERENTIAL METHOD BLD: ABNORMAL
EOSINOPHIL # BLD: 0 K/UL (ref 0–0.4)
EOSINOPHIL NFR BLD: 0 % (ref 0–7)
ERYTHROCYTE [DISTWIDTH] IN BLOOD BY AUTOMATED COUNT: 13.2 % (ref 11.5–14.5)
ERYTHROCYTE [SEDIMENTATION RATE] IN BLOOD: 39 MM/HR (ref 0–20)
GLOBULIN SER CALC-MCNC: 4 G/DL (ref 2–4)
GLUCOSE SERPL-MCNC: 93 MG/DL (ref 65–100)
HCT VFR BLD AUTO: 39.4 % (ref 36.6–50.3)
HGB BLD-MCNC: 13.1 G/DL (ref 12.1–17)
IMM GRANULOCYTES # BLD AUTO: 0 K/UL (ref 0–0.04)
IMM GRANULOCYTES NFR BLD AUTO: 0 % (ref 0–0.5)
LACTATE SERPL-SCNC: 1.8 MMOL/L (ref 0.4–2)
LYMPHOCYTES # BLD: 0.8 K/UL (ref 0.8–3.5)
LYMPHOCYTES NFR BLD: 25 % (ref 12–49)
MCH RBC QN AUTO: 28.6 PG (ref 26–34)
MCHC RBC AUTO-ENTMCNC: 33.2 G/DL (ref 30–36.5)
MCV RBC AUTO: 86 FL (ref 80–99)
MONOCYTES # BLD: 0.3 K/UL (ref 0–1)
MONOCYTES NFR BLD: 10 % (ref 5–13)
NEUTS SEG # BLD: 2 K/UL (ref 1.8–8)
NEUTS SEG NFR BLD: 63 % (ref 32–75)
NRBC # BLD: 0 K/UL (ref 0–0.01)
NRBC BLD-RTO: 0 PER 100 WBC
PLATELET # BLD AUTO: 217 K/UL (ref 150–400)
PMV BLD AUTO: 9.3 FL (ref 8.9–12.9)
POTASSIUM SERPL-SCNC: 3.9 MMOL/L (ref 3.5–5.1)
PROCALCITONIN SERPL-MCNC: 0.4 NG/ML
PROT SERPL-MCNC: 6.9 G/DL (ref 6.4–8.2)
RBC # BLD AUTO: 4.58 M/UL (ref 4.1–5.7)
SODIUM SERPL-SCNC: 133 MMOL/L (ref 136–145)
WBC # BLD AUTO: 3.1 K/UL (ref 4.1–11.1)

## 2019-11-16 PROCEDURE — 87389 HIV-1 AG W/HIV-1&-2 AB AG IA: CPT

## 2019-11-16 PROCEDURE — 86140 C-REACTIVE PROTEIN: CPT

## 2019-11-16 PROCEDURE — 80053 COMPREHEN METABOLIC PANEL: CPT

## 2019-11-16 PROCEDURE — 36415 COLL VENOUS BLD VENIPUNCTURE: CPT

## 2019-11-16 PROCEDURE — 84145 PROCALCITONIN (PCT): CPT

## 2019-11-16 PROCEDURE — 80074 ACUTE HEPATITIS PANEL: CPT

## 2019-11-16 PROCEDURE — 94760 N-INVAS EAR/PLS OXIMETRY 1: CPT

## 2019-11-16 PROCEDURE — 87116 MYCOBACTERIA CULTURE: CPT

## 2019-11-16 PROCEDURE — 74011250636 HC RX REV CODE- 250/636: Performed by: NURSE PRACTITIONER

## 2019-11-16 PROCEDURE — 74176 CT ABD & PELVIS W/O CONTRAST: CPT

## 2019-11-16 PROCEDURE — 87040 BLOOD CULTURE FOR BACTERIA: CPT

## 2019-11-16 PROCEDURE — 74011250637 HC RX REV CODE- 250/637: Performed by: EMERGENCY MEDICINE

## 2019-11-16 PROCEDURE — 65270000015 HC RM PRIVATE ONCOLOGY

## 2019-11-16 PROCEDURE — 83605 ASSAY OF LACTIC ACID: CPT

## 2019-11-16 PROCEDURE — 74011250637 HC RX REV CODE- 250/637: Performed by: HOSPITALIST

## 2019-11-16 PROCEDURE — 85025 COMPLETE CBC W/AUTO DIFF WBC: CPT

## 2019-11-16 PROCEDURE — 85652 RBC SED RATE AUTOMATED: CPT

## 2019-11-16 PROCEDURE — 99223 1ST HOSP IP/OBS HIGH 75: CPT | Performed by: INTERNAL MEDICINE

## 2019-11-16 PROCEDURE — 74011250636 HC RX REV CODE- 250/636: Performed by: HOSPITALIST

## 2019-11-16 PROCEDURE — 86706 HEP B SURFACE ANTIBODY: CPT

## 2019-11-16 PROCEDURE — 74011000258 HC RX REV CODE- 258: Performed by: HOSPITALIST

## 2019-11-16 PROCEDURE — 71250 CT THORAX DX C-: CPT

## 2019-11-16 RX ORDER — RIFAMPIN 300 MG/1
600 CAPSULE ORAL
Status: DISCONTINUED | OUTPATIENT
Start: 2019-11-17 | End: 2019-11-20 | Stop reason: DRUGHIGH

## 2019-11-16 RX ORDER — ACETAMINOPHEN 325 MG/1
325 TABLET ORAL
Status: DISCONTINUED | OUTPATIENT
Start: 2019-11-16 | End: 2019-11-20 | Stop reason: HOSPADM

## 2019-11-16 RX ORDER — SODIUM CHLORIDE 9 MG/ML
75 INJECTION, SOLUTION INTRAVENOUS CONTINUOUS
Status: DISCONTINUED | OUTPATIENT
Start: 2019-11-16 | End: 2019-11-20 | Stop reason: HOSPADM

## 2019-11-16 RX ORDER — LANOLIN ALCOHOL/MO/W.PET/CERES
50 CREAM (GRAM) TOPICAL DAILY
Status: DISCONTINUED | OUTPATIENT
Start: 2019-11-17 | End: 2019-11-20 | Stop reason: HOSPADM

## 2019-11-16 RX ORDER — ISONIAZID 300 MG/1
300 TABLET ORAL DAILY
Status: DISCONTINUED | OUTPATIENT
Start: 2019-11-17 | End: 2019-11-20 | Stop reason: HOSPADM

## 2019-11-16 RX ADMIN — ACETAMINOPHEN 650 MG: 325 TABLET ORAL at 03:46

## 2019-11-16 RX ADMIN — CETIRIZINE HYDROCHLORIDE 10 MG: 10 TABLET, FILM COATED ORAL at 09:45

## 2019-11-16 RX ADMIN — LATANOPROST 1 DROP: 50 SOLUTION OPHTHALMIC at 18:45

## 2019-11-16 RX ADMIN — Medication 10 ML: at 15:49

## 2019-11-16 RX ADMIN — ACETAMINOPHEN 650 MG: 325 TABLET ORAL at 12:10

## 2019-11-16 RX ADMIN — CEFTRIAXONE 1 G: 1 INJECTION, POWDER, FOR SOLUTION INTRAMUSCULAR; INTRAVENOUS at 09:47

## 2019-11-16 RX ADMIN — ASPIRIN 81 MG: 81 TABLET, COATED ORAL at 21:14

## 2019-11-16 RX ADMIN — DOCUSATE SODIUM 100 MG: 100 CAPSULE, LIQUID FILLED ORAL at 09:45

## 2019-11-16 RX ADMIN — SODIUM CHLORIDE 75 ML/HR: 900 INJECTION, SOLUTION INTRAVENOUS at 09:46

## 2019-11-16 RX ADMIN — VITAMIN D, TAB 1000IU (100/BT) 2 TABLET: 25 TAB at 09:45

## 2019-11-16 RX ADMIN — TIMOLOL MALEATE 1 DROP: 5 SOLUTION/ DROPS OPHTHALMIC at 09:53

## 2019-11-16 RX ADMIN — DOCUSATE SODIUM 100 MG: 100 CAPSULE, LIQUID FILLED ORAL at 18:39

## 2019-11-16 RX ADMIN — Medication 10 ML: at 21:16

## 2019-11-16 RX ADMIN — ACETAMINOPHEN 650 MG: 325 TABLET ORAL at 20:30

## 2019-11-16 RX ADMIN — ENOXAPARIN SODIUM 40 MG: 40 INJECTION SUBCUTANEOUS at 21:14

## 2019-11-16 RX ADMIN — Medication 1 TABLET: at 09:45

## 2019-11-16 RX ADMIN — Medication 1 CAPSULE: at 09:45

## 2019-11-16 NOTE — PROGRESS NOTES
Hospitalist Progress Note    NAME: Alfonzo Wang. :  1945   MRN:  [id number]     I reviewed pertinent labs/imaging and discussed plan of care with Dr. Russ Lawler who is in agreement. Assessment / Plan:  Fever  Leucopenia   Urine culture 19:  NG at 1 day. Urine AFB culture 19:  Negative  Blood culture 19:  NG at 3 days. Influenza A/B 19:  Negative. AFB culture 19:  Pending. CXR 19:  No evidence of acute cardiopulmonary process. - Non-toxic in appearance. - Spiked fever to 102.9 over noc. - Repeat blood cultures. - Repetitive order placed for repeat blood cultures for fever 100.4.  - CT chest/abdomen/pelvis. - Check  procalcitonin, LA, CRP, Sed rate. - Concern for BCG sepsis, Urine AFB negative. - Continue ceftriaxone 1 Gm IV q24h (day #3). - Patient taking po well but given fever spikes will add IVF. - Start NS at 75 cc/hr. - Awaiting ID input, Re-called consult. Bladder cancer  Prostate cancer  - Patient has been receiving BCG treatments. - Patient is in a study for prostate cancer at the [place] [id number]. Mild protein calorie malnutrition  - Nutrition consult placed, awaiting input. Mild asymptomatic hyponatremia  - stable. - Starting IVF. - Monitor. Elevated Alk Phos  Mild AST elevation   - Continue statin at this time, will need to monitor closely  - Daily CMP. Dyslipidemia  - Continue pravastatin 40 mg po daily, see not above. Glaucoma  - Continue latanoprost 0.005% ophthalmic solution 1 gtt ou daily. - Continue timolol 0.5% ophthalmic solution 1 gtt OD daily. 30.0 - 39.9 Obese / Body mass index is 30 kg/m². Code status: Full  Prophylaxis: Lovenox  Recommended Disposition: Home w/Family     Subjective:     Chief Complaint / Reason for Physician Visit  Patient currently denies chills, he does report over noc with fever spikes. No cough this a.m. No abdominal pain or diarrhea. No dysuria.     Plan of care and over noc events discussed with RN. Review of Systems:  Symptom Y/N Comments  Symptom Y/N Comments   Fever/Chills Y   Chest Pain N    Poor Appetite N   Edema N    Cough N   Abdominal Pain N    Sputum N   Joint Pain N    SOB/OWEN N   Pruritis/Rash N    Nausea/vomit N   Tolerating PT/OT     Diarrhea N   Tolerating Diet Y    Constipation N   Other       Could NOT obtain due to:      Objective:     VITALS:   Last 24hrs VS reviewed since prior progress note. Most recent are:  Patient Vitals for the past 24 hrs:   Temp Pulse Resp BP SpO2   11/16/19 0630 98.6 °F (37 °C)       11/16/19 0342 (!) 102.1 °F (38.9 °C) 92  146/66 95 %   11/15/19 1951 99.6 °F (37.6 °C) 73 16 122/51 95 %   11/15/19 1923 99.6 °F (37.6 °C)       11/15/19 1805 (!) 102.9 °F (39.4 °C)       11/15/19 1531 98.6 °F (37 °C) 78 16 139/74 96 %   11/15/19 1006 99.4 °F (37.4 °C)       11/15/19 0838 98.7 °F (37.1 °C) 81 16 106/52 94 %     No intake or output data in the 24 hours ending 11/16/19 0811     PHYSICAL EXAM:  General:  A/A/O X 3. NAD. Non-toxic in appearance. HEENT:  Normocephalic. Sclera anicteric. EOMI. Mucous membranes moist.   Chest:  Resps even/unlabored with symmetrical CWE. Air entry full. Lungs CTA. No use of accessory muscles. CV:  RRR. No peripheral edema. GI:  Abdomen soft/NT/ND. ABT X 4.    Neurologic:  Nonfocal.  CN II-XII grossly intact. Speech normal.     Psych:  Cooperative. No anxiety or agitation. Skin:  Intact. No rashes or jaundice.       Reviewed most current lab test results and cultures  YES  Reviewed most current radiology test results   YES  Review and summation of old records today    NO  Reviewed patient's current orders and MAR    YES  PMH/SH reviewed - no change compared to H&P  ________________________________________________________________________  Care Plan discussed with:    Comments   Patient Y    Family  Y    RN Y    Care Manager     Consultant Multidiciplinary team rounds were held today with , nursing, pharmacist and clinical coordinator. Patient's plan of care was discussed; medications were reviewed and discharge planning was addressed. ____________________________________________________________________________________________________  Wilhemenia Route, NP     Procedures: see electronic medical records for all procedures/Xrays and details which were not copied into this note but were reviewed prior to creation of Plan. LABS:  I reviewed today's most current labs and imaging studies.   Pertinent labs include:  Recent Labs     11/16/19  0356 11/15/19  0624 11/13/19  0856   WBC 3.1* 3.3* 4.7   HGB 13.1 12.6 13.4   HCT 39.4 38.0 40.1    218 239     Recent Labs     11/16/19  0356 11/15/19  0624 11/13/19  0856   * 133* 135*   K 3.9 3.8 4.0    104 105   CO2 23 21 25   GLU 93 90 99   BUN 12 13 9   CREA 1.04 0.96 1.14   CA 8.5 8.2* 8.7   ALB 2.9* 2.9* 3.3*   TBILI 0.7 1.0 0.8   SGOT 46* 32 30   ALT 38 34 44       Signed: Wilhemenia Route, NP

## 2019-11-16 NOTE — PROGRESS NOTES
emp 102.9 treated with Tylenol good response to 98.6 oral no complaints made     Bedside shift change report given to [address] [place] [address]  (oncoming nurse) by me (offgoing nurse). Report given with SBAR, MAR and Recent Results.

## 2019-11-16 NOTE — PROGRESS NOTES
Problem: Knowledge Deficit  Goal: *Participate in the learning process  Outcome: Progressing Towards Goal  Goal: *Verbalize description of procedure  Outcome: Progressing Towards Goal  Goal: *Verbalizes understanding and describes medication purposes and frequencies  Outcome: Progressing Towards Goal  Goal: *Knowledge of discharge instructions  Outcome: Progressing Towards Goal  Goal: Interventions  Outcome: Progressing Towards Goal     Problem: Patient Education: Go to Patient Education Activity  Goal: Patient/Family Education  Outcome: Progressing Towards Goal     Problem: Knowledge Deficit  Goal: *Participate in the learning process  Outcome: Progressing Towards Goal     Problem: Knowledge Deficit  Goal: *Verbalizes understanding and describes medication purposes and frequencies  Outcome: Progressing Towards Goal     Problem: Knowledge Deficit  Goal: Interventions  Outcome: Progressing Towards Goal     Problem: General Infection Care Plan (Adult and Pediatric)  Goal: Improvement in signs and symptoms of infection  Outcome: Progressing Towards Goal  Goal: *Optimize nutritional status  Outcome: Progressing Towards Goal     Problem: Patient Education: Go to Patient Education Activity  Goal: Patient/Family Education  Outcome: Progressing Towards Goal

## 2019-11-16 NOTE — CONSULTS
ID on call covering for   NAME:  Paras Ritter. :   1945                       MRN:   [id number]   Date/Time:  2019 5:54 PM  Subjective:   REASON FOR CONSULT:   Fever after BCG bladder ca therapy  79-year-old gentleman with H/O  BPH, Prostate cancer, Bladder cancer on BCG maintenance therapy, hyperlipidemia, glaucoma is admitted  With fever, fatigue and sweats for the past 17 days. Pt was diagnosed with bladder ca in  and after local tumor resection  has been on BCG therapy. He is followed by Vitaliy Dougherty. He gets 3 doses weekly every 6 months as maintenance dose. On 10/9/19 he went for bladder instillation and they were unable to catheterize him and it was traumatic and he was sent home with parks for a day and also got 3 days of cefdinir. Next week on 10/16  he received the treatment after a parks was placed and when he came home he noted dark bloody urine. On 10/23 they tried to catheterize him 3 times and after that were able to instil the BCG. On 10/31 they were able to get the parks in but he saw there was no urine and he was not able to hold  the instilled medicine for 2 hours like he normally woiuldl- he urinated after an hour. The same evening he had low grade fever which got worse and on  he went to his urologist and was given cipro 500mg BID which he took fro 7 days with no improvement in fever or  Fatigue. The cipro was continued for another week but he came to the ED as he was continuing to have high grade fever of 102 and fatigue and sweats. since  his temp curve has been as below          Blood culture neg, urine culture neg, CXR, CT chest and abdomen Neg, Flu neg  He was started on ceftriaxone on  with no improvement  ID has been asked to see patient for persistent fever.  He has sweats, nausea poor appetite and possibly a 9 pound weight loss  He denies any cough, sob, chest pain, vomiting,abdominal pain, yellow urine, rash, joint pain.   He works as an   No travel  No pets  No hardware   No steroids       Past Medical History:   Diagnosis Date    Allergic rhinitis 1/30/14    treated at [facility]    Arthritis     HANDS    Bladder cancer ([facility] [address] 06/02/2016    Dr. Berto Alarcon 8/10/16 f/u note path report too    Bladder cancer ([facility] [address] 06/2016    Chronic kidney disease     Chronic kidney disease, stage II (mild)     Diverticula of colon 7/18/12    mia monterroso    Diverticulosis of colon     Elevated PSA 10/2011    Dr Berto Alarcon    Glaucoma     9/21/16 note from [id number] [place] [address] Hypercholesterolemia     near syncope 10/06/15    notes from [facility] Other ill-defined conditions(799.89)     in clinical trial at [place] [address] for prostate cancer    Pneumonia     Prostate cancer ([facility] [address] 11/2012      6/3/16 [facility] notes  [facility] 8/10/16 note    Prostatitis     Aftab    Prostatitis 07/2014    aftab    Tinea cruris 3/09    and rosacea    Varicose veins     Vitamin D deficiency       Past Surgical History:   Procedure Laterality Date    ABDOMEN SURGERY PROC UNLISTED  00    umbilical hernia    CT HEART W/O CONT WITH CALCIUM  3/3/04    score 65.57    ENDOSCOPY, COLON, DIAGNOSTIC  8/06    5 yrs    HX APPENDECTOMY      HX OTHER SURGICAL  7/18/12    colonoscopy 5 year repeat    HX OTHER SURGICAL  4/2012    cyst on groin removed Dr Berto Alarcon    [facility]  10/21/15    echocardiogram due to syncope    HX UROLOGICAL  '90    hydrocele    HX UROLOGICAL      TURB X 3    HX UROLOGICAL  05/2017    Biopsy and tumor [facility] bladder [facility]  Works as an   Lives with wife  Non smoker  Ex alcohol use     Family History   Problem Relation Age of Onset    Heart Disease Mother     Cancer Mother         lung    Asthma Mother     Diabetes Father     Thyroid Disease Sister     Cancer Sister 61        breast and throat    Stroke Paternal Grandfather     MS Sister     Cancer Sister         THYROID    Anesth Problems Neg Hx      Allergies   Allergen Reactions    Nsaids (Non-Steroidal Anti-Inflammatory Drug) Other (comments)     As per physcian order due to glaucoma and CKD           Current Facility-Administered Medications   Medication Dose Route Frequency Provider Last Rate Last Dose    0.9% sodium chloride infusion  75 mL/hr IntraVENous CONTINUOUS [facility], NP 75 mL/hr at 11/16/19 0946 75 mL/hr at 11/16/19 0946    acetaminophen (TYLENOL) tablet 650 mg  650 mg Oral Q6H PRN Louis Diallo MD   650 mg at 11/16/19 1210    sodium chloride (NS) flush 5-40 mL  5-40 mL IntraVENous Q8H Capri Rodriguez MD   10 mL at 11/16/19 1549    sodium chloride (NS) flush 5-40 mL  5-40 mL IntraVENous PRN Capri Rodriguez MD        ondansetron [facility]) injection 4 mg  4 mg IntraVENous Q6H PRN Capri Rodriguez MD        docusate sodium (COLACE) capsule 100 mg  100 mg Oral BID Capri Rodriguez MD   100 mg at 11/16/19 0945    enoxaparin (LOVENOX) injection 40 mg  40 mg SubCUTAneous Q24H Capri Rodriguez MD   40 mg at 11/15/19 2225    cetirizine (ZYRTEC) tablet 10 mg  10 mg Oral DAILY Capri Rodriguez MD   10 mg at 11/16/19 0945    cholecalciferol (VITAMIN D3) (1000 Units /25 mcg) tablet 2 Tab  2,000 Units Oral DAILY Capri Rodriguez MD   2 Tab at 11/16/19 0945    prenatal vit-iron fumarate-fa (PRENATAL PLUS with IRON) tablet 1 Tab  1 Tab Oral DAILY Capri Rodriguez MD   1 Tab at 11/16/19 0945    pravastatin (PRAVACHOL) tablet 40 mg  40 mg Oral QHS Capri Rodriguez MD   40 mg at 11/15/19 2225    timolol (TIMOPTIC) 0.5 % ophthalmic solution 1 Drop  1 Drop Right Eye DAILY Capri Rodriguez MD   1 Drop at 11/16/19 0953    latanoprost (XALATAN) 0.005 % ophthalmic solution 1 Drop  1 Drop Both Eyes QPM Cpari Rodriguez MD   1 Drop at 11/15/19 1807    melatonin tablet 3 mg  3 mg Oral QHS PRN Capri Rodriguez MD        cefTRIAXone (ROCEPHIN) 1 g in 0.9% sodium chloride (MBP/ADV) 50 mL  1 g IntraVENous Q24H Macarena Espinoza  mL/hr at 11/16/19 0947 1 g at 11/16/19 5232    aspirin delayed-release tablet 81 mg  81 mg Oral QHS Macarena Espinoza MD   81 mg at 11/15/19 2225    lactobac ac& pc-s.therm-b.anim (EMILY Q/RISAQUAD)  1 Cap Oral DAILY Macarena Espinoza MD   1 Cap at 11/16/19 0945    influenza vaccine 2019-20 (6 mos+)(PF) (FLUARIX/FLULAVAL/FLUZONE QUAD) injection 0.5 mL  0.5 mL IntraMUSCular PRIOR TO DISCHARGE Macarena Espinoza MD            REVIEW OF SYSTEMS:     Const:  fever, neg chills, positive weight loss  Eyes:  negative diplopia or visual changes, negative eye pain, has glaucoma  ENT:  negative coryza, negative sore throat  Resp:  negative cough, hemoptysis, dyspnea  Cards: negative for chest pain, palpitations, lower extremity edema  : negative for frequency, dysuria and hematuria  GI: Negative for abdominal pain, diarrhea, bleeding, constipation  Skin:  negative for rash and pruritus  Heme: negative for easy bruising and gum/nose bleeding  MS: negative for myalgias, arthralgias, back pain and muscle weakness  Neurolo+ headaches, no dizziness, vertigo, memory problems   Psych: negative for feelings of anxiety, depression   Endocrine: negative for thyroid, diabetes issues  Allergy/Immunology- NSAID CI due to glaucoma        Objective:   VITALS:    Visit Vitals  /61 (BP 1 Location: Left arm, BP Patient Position: At rest)   Pulse 81   Temp 98.2 °F (36.8 °C)   Resp 16   Ht 5' 8\" (1.727 m)   Wt 197 lb 5 oz (89.5 kg)   SpO2 95%   BMI 30.00 kg/m²       PHYSICAL EXAM:   General:    Alert, cooperative, no distress, appears stated age. Head:   Normocephalic, without obvious abnormality, atraumatic. Eyes:   Conjunctivae clear, anicteric sclerae. Pupils are equal  ENT  Nares normal. No drainage or sinus tenderness. Lips, mucosa, and tongue normal.  No Thrush  Neck:  Supple, symmetrical,  no adenopathy, thyroid: non tender    no carotid bruit and no JVD.   Back:    No CVA tenderness. Lungs:   Clear to auscultation bilaterally. No Wheezing or Rhonchi. No rales. Heart:   Regular rate and rhythm,  no murmur, rub or gallop. Abdomen:   Soft, non-tender,not distended. Bowel sounds normal. No masses  Extremities:   atraumatic, no cyanosis. No edema. No clubbing  Skin:     No rashes or lesions. Or bruising  Lymph: Cervical, supraclavicular normal.  Neurologic: Grossly non-focal    Pertinent Labs    Recent Labs     11/16/19  0356 11/15/19  0624   WBC 3.1* 3.3*   HGB 13.1 12.6   HCT 39.4 38.0    218     Recent Labs     11/16/19  0356 11/15/19  0624   * 133*   K 3.9 3.8    104   CO2 23 21   BUN 12 13   CREA 1.04 0.96   GLU 93 90   CA 8.5 8.2*     Recent Labs     11/16/19  0356 11/15/19  0624   SGOT 46* 32   ALT 38 34   * 228*   TBILI 0.7 1.0   TP 6.9 7.0   ALB 2.9* 2.9*   GLOB 4.0 4.1*     BC 11/13 NG  UC NG  IMAGING RESULTS:  CT chest/Abd N  CXR N      Impression/Recommendation  70-year-old gentleman with H/O  BPH, Prostate cancer, on BCG maintenance therapy, hyperlipidemia, glaucoma is admitted  With fever, fatigue and sweats for the past 17 days. Pt was diagnosed with bladder ca in 2017 and after local tumor resection  has been on BCG therapy. He is followed by [facility]. He gest 3 doses weekly every 6 months as maintenance dose. On 10/9/19 he went for bladder instillation and they were unable to catheterize him and it was traumatic and he was sent home with parks for a day and also got 3 days of cefdinir. Next week on 10/16  he received the treatment after a parks was placed and when he came home he noted dark bloody urine. On 10/23 they tried to catheterize him 3 times and after that were able to instil the BCG. On 10/31 they were able to get the parks in but he saw there was no urine and he was not able to hold  the instilled medicine for 2 hours like he normally woiuldl- he urinated after an hour.  The same evening he had low grade fever which got worse and on 11/1 he went to his urologist and was given cipro 500mg BID which he took fro 7 days with no improvement in fever or  Fatigue. The cipro was continued for another week but he came to the ED on 11/13    Fever following BCG bladder instillation in Oct 2019 _ Traumatic catheterization on 10/23 and he received BCG following that- Fever has no responded to cipro for 12 days as OP. Clinically and by imaging no pneumonia, bacteremia, flu, UTI  Urine has been sent for Afb  D.D  ? BCG infection  ? 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 Will start INH 300mg  and rifampin 600mg every day + pyridoxine 50 mg    While on treatment follow BMP/LFTS /CBC closely  Will stop statin while on treatment. Avoid/minimize use of  other hepatotoxic drugs like tylenol  Will get hepatitis panel and HIV   Will send blood for Afb culture  Talk to lab to send urine for Mycobacteria PCR   Duration of treatment will depend on his response  Also the role of steroids and when to add will depend on his response and also whether hypersensitivity is playing  a role  Infection due to BCG ( mycobacterium Bovis ) is uncommon  and rarely seen.  It  may be better to talk to experts at [place] [address]  ( he is also enrolled in a study there for prostate cancer)  Discuss with     Bladder Ca followed by urologist    Hyperlipidemia- on stain- hold while on ATT    Glaucoma on Xalatan and timolol eye drops    Discussed with patient and his wife in great detail  Explained the side effects of meds  Spent more than 60 minutes 50% of which was counseling  patient and his wife on the pathogenesis, management   will resume care on Monday

## 2019-11-16 NOTE — PROGRESS NOTES
Oncology End of Shift Note      Bedside shift change report given to GALILEA Funk (incoming nurse) by Loco Roy (outgoing nurse[facility] . Report included the following information SBAR, Kardex, Intake/Output, MAR, Accordion and Recent Results. Shift Summary: pt still having neutropenic fevers; ambulatory in the halls; family at the bedside      Issues for Physician to Address:  ID has not seen the patient and has not returned my page     Patient on Cardiac Monitoring?     [] Yes  [x] No    Rhythm:          Shift Events        Loco Roy

## 2019-11-16 NOTE — PROGRESS NOTES
Nutrition Assessment:    INTERVENTIONS/RECOMMENDATIONS:   Meals/Snacks: General/healthful diet: Liberalize to regular diet   Supplements: Commercial supplement: Ensure enlive daily    ASSESSMENT:   Patient medically noted for fever. PMH CKD, bladder cancer, prostate cancer. Patient receiving a cardiac diet. He reports his appetite is at about half of his normal appetite. Eating ~50% of meals. He lost weight PTA but has already gained back a few pounds per his/family report. Appetite decreases when he spikes a fever but otherwise feels he is eating better. No obvious signs of muscle wasting or fat loss. Unable to diagnosis malnutrition at this time. Has never tried supplements but agreeable to trial of ensure daily. Using menu/room service. Will also liberalize diet to help encourage PO intake. Diet Order: Cardiac  % Eaten:  No data found. Pertinent Medications: [x] Reviewed []Other: Vitamin D, Colace, Ivanna Q, Pravastatin, MVI   Pertinent Labs: [x]Reviewed  []Other: Na 133  Food Allergies: [x]None []Yes:     Last BM: 11/12  []Active     []Hyperactive  []Hypoactive       [] Absent  BS  Skin:    [x] Intact   [] Incision  [] Breakdown   []Edema   []Other:    Anthropometrics: Height: 5' 8\" (172.7 cm) Weight: 89.5 kg (197 lb 5 oz)    IBW (%IBW):   ( ) UBW (%UBW):   (  %)    BMI: Body mass index is 30 kg/m². This BMI is indicative of:  []Underweight   []Normal   []Overweight   [x] Obesity   [] Extreme Obesity (BMI>40)  Last Weight Metrics:  Weight Loss Metrics 11/13/2019 3/6/2019 8/30/2017 6/14/2017 5/22/2017 5/10/2017 8/26/2016   Today's Wt 197 lb 5 oz 206 lb 14.4 oz 197 lb 1.6 oz 199 lb 9.6 oz 200 lb 200 lb 202 lb 11.2 oz   BMI 30 kg/m2 32.41 kg/m2 30.87 kg/m2 30.35 kg/m2 30.41 kg/m2 30.41 kg/m2 31.75 kg/m2       Estimated Nutrition Needs (Based on): 2099 Kcals/day(BMR (1615) x 1. 3AF) , 90 g(-108g (1.0-1.2 g/kg bw)) Protein  Carbohydrate:  At Least 130 g/day  Fluids: 2000 mL/day     Pt expected to meet estimated nutrient needs: [x]Yes []No    NUTRITION DIAGNOSES:   Problem:  Unintended weight loss      Etiology: related to decreased appetite caused by fever     Signs/Symptoms: as evidenced by 9 lbs weight loss in 1 week      NUTRITION INTERVENTIONS:  Meals/Snacks: General/healthful diet   Supplements: Commercial supplement              GOAL:   PO intake >50% of meals/supplement next 2-4 days    NUTRITION MONITORING AND EVALUATION   Food/Nutrient Intake Outcomes:  Total energy intake  Physical Signs/Symptoms Outcomes: Weight/weight change, Electrolyte and renal profile    Previous Goal Met:   [] Met              [x] Progressing Towards Goal              [] Not Progressing Towards Goal   Previous Recommendations:   [x] Implemented          [] Not Implemented          [] Not Applicable    LEARNING NEEDS (Diet, Food/Nutrient-Drug Interaction):    [x] None Identified   [] Identified and Education Provided/Documented   [] Identified and Pt declined/was not appropriate     Cultural, Church, OR Ethnic Dietary Needs:    [x] None Identified   [] Identified and Addressed     [x] Interdisciplinary Care Plan Reviewed/Documented    [x] Discharge Planning: Regular diet    [] Participated in Interdisciplinary Rounds    NUTRITION RISK:    [x] Patient At Nutritional Risk             [] Patient Not At Nutritional Risk      [facility] [address]  Pager [phone]    Weekend Pager [phone]

## 2019-11-17 LAB
ALBUMIN SERPL-MCNC: 2.8 G/DL (ref 3.5–5)
ALBUMIN/GLOB SERPL: 0.7 {RATIO} (ref 1.1–2.2)
ALP SERPL-CCNC: 249 U/L (ref 45–117)
ALT SERPL-CCNC: 40 U/L (ref 12–78)
ANION GAP SERPL CALC-SCNC: 8 MMOL/L (ref 5–15)
AST SERPL-CCNC: 45 U/L (ref 15–37)
BASOPHILS # BLD: 0 K/UL (ref 0–0.1)
BASOPHILS NFR BLD: 1 % (ref 0–1)
BILIRUB SERPL-MCNC: 0.5 MG/DL (ref 0.2–1)
BUN SERPL-MCNC: 11 MG/DL (ref 6–20)
BUN/CREAT SERPL: 12 (ref 12–20)
CALCIUM SERPL-MCNC: 8 MG/DL (ref 8.5–10.1)
CHLORIDE SERPL-SCNC: 105 MMOL/L (ref 97–108)
CO2 SERPL-SCNC: 23 MMOL/L (ref 21–32)
CREAT SERPL-MCNC: 0.91 MG/DL (ref 0.7–1.3)
DIFFERENTIAL METHOD BLD: ABNORMAL
EOSINOPHIL # BLD: 0 K/UL (ref 0–0.4)
EOSINOPHIL NFR BLD: 0 % (ref 0–7)
ERYTHROCYTE [DISTWIDTH] IN BLOOD BY AUTOMATED COUNT: 13.2 % (ref 11.5–14.5)
GLOBULIN SER CALC-MCNC: 3.8 G/DL (ref 2–4)
GLUCOSE SERPL-MCNC: 91 MG/DL (ref 65–100)
HAV IGM SER QL: NONREACTIVE
HBV CORE IGM SER QL: NONREACTIVE
HBV SURFACE AB SER QL: NONREACTIVE
HBV SURFACE AB SER-ACNC: <3.1 MIU/ML
HBV SURFACE AG SER QL: <0.1 INDEX
HBV SURFACE AG SER QL: NEGATIVE
HCT VFR BLD AUTO: 36.4 % (ref 36.6–50.3)
HCV AB SERPL QL IA: NONREACTIVE
HCV COMMENT,HCGAC: NORMAL
HGB BLD-MCNC: 12.3 G/DL (ref 12.1–17)
HIV 1+2 AB+HIV1 P24 AG SERPL QL IA: NONREACTIVE
HIV12 RESULT COMMENT, HHIVC: NORMAL
IMM GRANULOCYTES # BLD AUTO: 0 K/UL (ref 0–0.04)
IMM GRANULOCYTES NFR BLD AUTO: 0 % (ref 0–0.5)
LYMPHOCYTES # BLD: 0.7 K/UL (ref 0.8–3.5)
LYMPHOCYTES NFR BLD: 27 % (ref 12–49)
MCH RBC QN AUTO: 28.6 PG (ref 26–34)
MCHC RBC AUTO-ENTMCNC: 33.8 G/DL (ref 30–36.5)
MCV RBC AUTO: 84.7 FL (ref 80–99)
MONOCYTES # BLD: 0.3 K/UL (ref 0–1)
MONOCYTES NFR BLD: 12 % (ref 5–13)
NEUTS SEG # BLD: 1.6 K/UL (ref 1.8–8)
NEUTS SEG NFR BLD: 60 % (ref 32–75)
NRBC # BLD: 0 K/UL (ref 0–0.01)
NRBC BLD-RTO: 0 PER 100 WBC
PLATELET # BLD AUTO: 178 K/UL (ref 150–400)
PMV BLD AUTO: 8.9 FL (ref 8.9–12.9)
POTASSIUM SERPL-SCNC: 3.8 MMOL/L (ref 3.5–5.1)
PROT SERPL-MCNC: 6.6 G/DL (ref 6.4–8.2)
RBC # BLD AUTO: 4.3 M/UL (ref 4.1–5.7)
RBC MORPH BLD: ABNORMAL
SODIUM SERPL-SCNC: 136 MMOL/L (ref 136–145)
SP1: NORMAL
SP2: NORMAL
SP3: NORMAL
WBC # BLD AUTO: 2.6 K/UL (ref 4.1–11.1)

## 2019-11-17 PROCEDURE — 65270000015 HC RM PRIVATE ONCOLOGY

## 2019-11-17 PROCEDURE — 94760 N-INVAS EAR/PLS OXIMETRY 1: CPT

## 2019-11-17 PROCEDURE — 74011000258 HC RX REV CODE- 258: Performed by: HOSPITALIST

## 2019-11-17 PROCEDURE — 74011250637 HC RX REV CODE- 250/637: Performed by: HOSPITALIST

## 2019-11-17 PROCEDURE — 85025 COMPLETE CBC W/AUTO DIFF WBC: CPT

## 2019-11-17 PROCEDURE — 74011250636 HC RX REV CODE- 250/636: Performed by: HOSPITALIST

## 2019-11-17 PROCEDURE — 74011250637 HC RX REV CODE- 250/637: Performed by: INTERNAL MEDICINE

## 2019-11-17 PROCEDURE — 74011250636 HC RX REV CODE- 250/636: Performed by: NURSE PRACTITIONER

## 2019-11-17 PROCEDURE — 87040 BLOOD CULTURE FOR BACTERIA: CPT

## 2019-11-17 PROCEDURE — 80053 COMPREHEN METABOLIC PANEL: CPT

## 2019-11-17 PROCEDURE — 36415 COLL VENOUS BLD VENIPUNCTURE: CPT

## 2019-11-17 RX ADMIN — CEFTRIAXONE 1 G: 1 INJECTION, POWDER, FOR SOLUTION INTRAMUSCULAR; INTRAVENOUS at 09:50

## 2019-11-17 RX ADMIN — SODIUM CHLORIDE 75 ML/HR: 900 INJECTION, SOLUTION INTRAVENOUS at 23:41

## 2019-11-17 RX ADMIN — RIFAMPIN 600 MG: 300 CAPSULE ORAL at 08:30

## 2019-11-17 RX ADMIN — PYRIDOXINE HCL TAB 50 MG 50 MG: 50 TAB at 08:30

## 2019-11-17 RX ADMIN — ACETAMINOPHEN 325 MG: 325 TABLET ORAL at 06:10

## 2019-11-17 RX ADMIN — SODIUM CHLORIDE 75 ML/HR: 900 INJECTION, SOLUTION INTRAVENOUS at 05:58

## 2019-11-17 RX ADMIN — DOCUSATE SODIUM 100 MG: 100 CAPSULE, LIQUID FILLED ORAL at 17:43

## 2019-11-17 RX ADMIN — ENOXAPARIN SODIUM 40 MG: 40 INJECTION SUBCUTANEOUS at 22:28

## 2019-11-17 RX ADMIN — CETIRIZINE HYDROCHLORIDE 10 MG: 10 TABLET, FILM COATED ORAL at 08:30

## 2019-11-17 RX ADMIN — Medication 1 CAPSULE: at 08:30

## 2019-11-17 RX ADMIN — Medication 10 ML: at 15:12

## 2019-11-17 RX ADMIN — ACETAMINOPHEN 325 MG: 325 TABLET ORAL at 16:09

## 2019-11-17 RX ADMIN — LATANOPROST 1 DROP: 50 SOLUTION OPHTHALMIC at 17:43

## 2019-11-17 RX ADMIN — TIMOLOL MALEATE 1 DROP: 5 SOLUTION/ DROPS OPHTHALMIC at 09:55

## 2019-11-17 RX ADMIN — Medication 1 TABLET: at 08:30

## 2019-11-17 RX ADMIN — ACETAMINOPHEN 325 MG: 325 TABLET ORAL at 23:41

## 2019-11-17 RX ADMIN — DOCUSATE SODIUM 100 MG: 100 CAPSULE, LIQUID FILLED ORAL at 08:30

## 2019-11-17 RX ADMIN — ISONIAZID 300 MG: 300 TABLET ORAL at 08:30

## 2019-11-17 RX ADMIN — ASPIRIN 81 MG: 81 TABLET, COATED ORAL at 22:28

## 2019-11-17 RX ADMIN — VITAMIN D, TAB 1000IU (100/BT) 2 TABLET: 25 TAB at 08:30

## 2019-11-17 RX ADMIN — Medication 10 ML: at 22:29

## 2019-11-17 NOTE — PROGRESS NOTES
Hospitalist Progress Note    NAME: Suman Costello. :  1945   MRN:  [id number]     I reviewed pertinent labs/imaging and discussed plan of care with Dr. Olivia Hernandez who is in agreement. Assessment / Plan:  Fever  Leucopenia   Urine culture 19:  NG at 1 day. Urine AFB culture 19:  Negative  Blood culture 19:  NG at 4 days. Blood culture 19:  NG at 22 hours. Influenza A/B 19:  Negative. AFB culture 19:  Pending. CXR 19:  No evidence of acute cardiopulmonary process. CT chest/abdomen/pelvis 19:  Clear lungs. No bowel obstruction, ileus or perforation. No intra-abdominal abscess. CRP 19:  5.57  Procalcitonin 19:  0.4  LA 19:  1.8  - ID input appreciated. - Non-toxic in appearance. - 24 hour Tmax 102.7  - Repetitive order placed for repeat blood cultures for fever 100.4.  - CT results reviewed  - Check  procalcitonin, LA, CRP, Sed rate. - Concern for BCG sepsis, Urine AFB negative. - Continue ceftriaxone 1 Gm IV q24h (day #4). - Continue IVF at 75 cc/hr.  - Started on isoniazid 300 mg po daily by ID.  - Started on rifampin 600 mg po daily by ID.  - Started on pyridoxine 50 mg po daily by ID. Bladder cancer  Prostate cancer  - Patient has been receiving BCG treatments. - Patient is in a study for prostate cancer at the [place] [address]. Mild protein calorie malnutrition  - Nutrition input appreciated. - Ensure Enlive added daily. Mild asymptomatic hyponatremia  - resolved. - Monitor. Elevated Alk Phos  Mild AST elevation   - Statin stopped 2/2 new ID treatment. - Daily CMP. Dyslipidemia  - Pravastatin 40 mg po daily stopped at this time. Glaucoma  - Continue latanoprost 0.005% ophthalmic solution 1 gtt ou daily. - Continue timolol 0.5% ophthalmic solution 1 gtt OD daily. 30.0 - 39.9 Obese / Body mass index is 30 kg/m².     Code status: Full  Prophylaxis: Lovenox  Recommended Disposition: Home w/Family Subjective:     Chief Complaint / Reason for Physician Visit  No complaints. States \"I feel good right now\"    Plan of care and over noc events discussed with RN. Review of Systems:  Symptom Y/N Comments  Symptom Y/N Comments   Fever/Chills Y   Chest Pain N    Poor Appetite N   Edema N    Cough N   Abdominal Pain N    Sputum N   Joint Pain N    SOB/OWEN N   Pruritis/Rash N    Nausea/vomit N   Tolerating PT/OT     Diarrhea N   Tolerating Diet Y    Constipation N   Other       Could NOT obtain due to:      Objective:     VITALS:   Last 24hrs VS reviewed since prior progress note. Most recent are:  Patient Vitals for the past 24 hrs:   Temp Pulse Resp BP SpO2   11/17/19 0818 98.4 °F (36.9 °C) 74 16 127/67 92 %   11/17/19 0608 (!) 101.1 °F (38.4 °C)       11/16/19 2330 98.6 °F (37 °C) 73 16 116/62 94 %   11/16/19 2114 99.9 °F (37.7 °C)       11/16/19 2018 (!) 102.7 °F (39.3 °C) 78 16 161/72 97 %   11/16/19 1525 98.2 °F (36.8 °C) 81 16 135/61 95 %       Intake/Output Summary (Last 24 hours) at 11/17/2019 1014  Last data filed at 11/17/2019 0316  Gross per 24 hour   Intake 1462.5 ml   Output    Net 1462.5 ml        PHYSICAL EXAM:  General:  A/A/O X 3. NAD. Non-toxic in appearance. HEENT:  Normocephalic. Sclera anicteric. EOMI. Mucous membranes moist.   Chest:  Resps even/unlabored with symmetrical CWE. Air entry full. Lungs CTA. No use of accessory muscles. CV:  RRR. No peripheral edema. GI:  Abdomen soft/NT/ND. ABT X 4.    Neurologic:  Nonfocal.  CN II-XII grossly intact. Speech normal.     Psych:  Cooperative. No anxiety or agitation. Skin:  Intact. No rashes or jaundice.       Reviewed most current lab test results and cultures  YES  Reviewed most current radiology test results   YES  Review and summation of old records today    NO  Reviewed patient's current orders and MAR    YES  PMH/SH reviewed - no change compared to H&P  ________________________________________________________________________  Care Plan discussed with:    Comments   Patient [address]     Consultant                        Multidiciplinary team rounds were held today with , nursing, pharmacist and clinical coordinator. Patient's plan of care was discussed; medications were reviewed and discharge planning was addressed. ____________________________________________________________________________________________________  Gio Salazar NP     Procedures: see electronic medical records for all procedures/Xrays and details which were not copied into this note but were reviewed prior to creation of Plan. LABS:  I reviewed today's most current labs and imaging studies.   Pertinent labs include:  Recent Labs     11/17/19  0606 11/16/19  0356 11/15/19  0624   WBC 2.6* 3.1* 3.3*   HGB 12.3 13.1 12.6   HCT 36.4* 39.4 38.0    217 218     Recent Labs     11/17/19  0606 11/16/19  0356 11/15/19  0624    133* 133*   K 3.8 3.9 3.8    104 104   CO2 23 23 21   GLU 91 93 90   BUN 11 12 13   CREA 0.91 1.04 0.96   CA 8.0* 8.5 8.2*   ALB 2.8* 2.9* 2.9*   TBILI 0.5 0.7 1.0   SGOT 45* 46* 32   ALT 40 38 34       Signed: Gio Salazar NP

## 2019-11-17 NOTE — PROGRESS NOTES
Problem: Falls - Risk of  Goal: *Absence of Falls  Description  Document Rebbecca Persons Fall Risk and appropriate interventions in the flowsheet.   Outcome: Progressing Towards Goal  Note:   Fall Risk Interventions:            Medication Interventions: Evaluate medications/consider consulting pharmacy, Teach patient to arise slowly

## 2019-11-17 NOTE — PROGRESS NOTES
Oncology End of Shift Note      Bedside shift change report given to Raquel Aguilar (incoming nurse) by Estiven Acosta RN (outgoing nurse) on [facility]. . Report included the following information SBAR. Shift Summary: Labs, CT scan, meds, meals,monitor vs      Issues for Physician to Address:     Patient on Cardiac Monitoring?    [] Yes  [x] No    Rhythm:              Francisco Gustafson RN

## 2019-11-17 NOTE — PROGRESS NOTES
He was in the bathroom bathing when I made rounds. Family in room. Continues to have intermittent temps. Dr Briana Jaramillo help appreciated.

## 2019-11-17 NOTE — PROGRESS NOTES
Problem: Knowledge Deficit  Goal: *Participate in the learning process  Outcome: Progressing Towards Goal  Goal: *Verbalize description of procedure  Outcome: Progressing Towards Goal  Goal: *Verbalizes understanding and describes medication purposes and frequencies  Outcome: Progressing Towards Goal  Goal: *Knowledge of discharge instructions  Outcome: Progressing Towards Goal  Goal: Interventions  Outcome: Progressing Towards Goal     Problem: Knowledge Deficit  Goal: *Verbalize description of procedure  Outcome: Progressing Towards Goal     Problem: Knowledge Deficit  Goal: *Verbalizes understanding and describes medication purposes and frequencies  Outcome: Progressing Towards Goal     Problem: Knowledge Deficit  Goal: *Knowledge of discharge instructions  Outcome: Progressing Towards Goal     Problem: Knowledge Deficit  Goal: Interventions  Outcome: Progressing Towards Goal     Problem: General Infection Care Plan (Adult and Pediatric)  Goal: Improvement in signs and symptoms of infection  Outcome: Progressing Towards Goal  Goal: *Optimize nutritional status  Outcome: Progressing Towards Goal     Problem: General Infection Care Plan (Adult and Pediatric)  Goal: *Optimize nutritional status  Outcome: Progressing Towards Goal     Problem: Falls - Risk of  Goal: *Absence of Falls  Outcome: Progressing Towards Goal  Note:   Fall Risk Interventions:            Medication Interventions: Evaluate medications/consider consulting pharmacy, Teach patient to arise slowly                   Problem: Patient Education: Go to Patient Education Activity  Goal: Patient/Family Education  Outcome: Progressing Towards Goal

## 2019-11-17 NOTE — PROGRESS NOTES
ID  Pt stable  Patient Vitals for the past 24 hrs:   Temp Pulse Resp BP SpO2   11/17/19 0818 98.4 °F (36.9 °C) 74 16 127/67 92 %   11/17/19 0608 (!) 101.1 °F (38.4 °C)       11/16/19 2330 98.6 °F (37 °C) 73 16 116/62 94 %   11/16/19 2114 99.9 °F (37.7 °C)       11/16/19 2018 (!) 102.7 °F (39.3 °C) 78 16 161/72 97 %   11/16/19 1525 98.2 °F (36.8 °C) 81 16 135/61 95 %     Started INH/Rifampin this morning  Wbc 2.6-(N 60%)  could be due to the m. Bovis infection- (  and HB 12.3)   watch closely-    HIV/Hepatitis screen Neg.( informed patient)   will follow him tomorrow

## 2019-11-18 LAB
ALBUMIN SERPL-MCNC: 2.7 G/DL (ref 3.5–5)
ALBUMIN/GLOB SERPL: 0.7 {RATIO} (ref 1.1–2.2)
ALP SERPL-CCNC: 263 U/L (ref 45–117)
ALT SERPL-CCNC: 40 U/L (ref 12–78)
ANION GAP SERPL CALC-SCNC: 7 MMOL/L (ref 5–15)
AST SERPL-CCNC: 42 U/L (ref 15–37)
BACTERIA SPEC CULT: NORMAL
BASOPHILS # BLD: 0 K/UL (ref 0–0.1)
BASOPHILS NFR BLD: 2 % (ref 0–1)
BILIRUB SERPL-MCNC: 1.1 MG/DL (ref 0.2–1)
BUN SERPL-MCNC: 9 MG/DL (ref 6–20)
BUN/CREAT SERPL: 10 (ref 12–20)
CALCIUM SERPL-MCNC: 8.2 MG/DL (ref 8.5–10.1)
CHLORIDE SERPL-SCNC: 107 MMOL/L (ref 97–108)
CO2 SERPL-SCNC: 24 MMOL/L (ref 21–32)
CREAT SERPL-MCNC: 0.93 MG/DL (ref 0.7–1.3)
DIFFERENTIAL METHOD BLD: ABNORMAL
EOSINOPHIL # BLD: 0 K/UL (ref 0–0.4)
EOSINOPHIL NFR BLD: 0 % (ref 0–7)
ERYTHROCYTE [DISTWIDTH] IN BLOOD BY AUTOMATED COUNT: 13.2 % (ref 11.5–14.5)
GLOBULIN SER CALC-MCNC: 3.7 G/DL (ref 2–4)
GLUCOSE SERPL-MCNC: 106 MG/DL (ref 65–100)
HCT VFR BLD AUTO: 35.6 % (ref 36.6–50.3)
HGB BLD-MCNC: 11.7 G/DL (ref 12.1–17)
IMM GRANULOCYTES # BLD AUTO: 0 K/UL (ref 0–0.04)
IMM GRANULOCYTES NFR BLD AUTO: 0 % (ref 0–0.5)
LYMPHOCYTES # BLD: 0.6 K/UL (ref 0.8–3.5)
LYMPHOCYTES NFR BLD: 28 % (ref 12–49)
MCH RBC QN AUTO: 28.1 PG (ref 26–34)
MCHC RBC AUTO-ENTMCNC: 32.9 G/DL (ref 30–36.5)
MCV RBC AUTO: 85.6 FL (ref 80–99)
MONOCYTES # BLD: 0.2 K/UL (ref 0–1)
MONOCYTES NFR BLD: 10 % (ref 5–13)
NEUTS SEG # BLD: 1.3 K/UL (ref 1.8–8)
NEUTS SEG NFR BLD: 59 % (ref 32–75)
NRBC # BLD: 0 K/UL (ref 0–0.01)
NRBC BLD-RTO: 0 PER 100 WBC
PLATELET # BLD AUTO: 163 K/UL (ref 150–400)
PMV BLD AUTO: 9.2 FL (ref 8.9–12.9)
POTASSIUM SERPL-SCNC: 3.7 MMOL/L (ref 3.5–5.1)
PROT SERPL-MCNC: 6.4 G/DL (ref 6.4–8.2)
RBC # BLD AUTO: 4.16 M/UL (ref 4.1–5.7)
SERVICE CMNT-IMP: NORMAL
SODIUM SERPL-SCNC: 138 MMOL/L (ref 136–145)
WBC # BLD AUTO: 2.2 K/UL (ref 4.1–11.1)

## 2019-11-18 PROCEDURE — 85025 COMPLETE CBC W/AUTO DIFF WBC: CPT

## 2019-11-18 PROCEDURE — 74011250636 HC RX REV CODE- 250/636: Performed by: HOSPITALIST

## 2019-11-18 PROCEDURE — 65270000015 HC RM PRIVATE ONCOLOGY

## 2019-11-18 PROCEDURE — 74011250637 HC RX REV CODE- 250/637: Performed by: HOSPITALIST

## 2019-11-18 PROCEDURE — 74011250637 HC RX REV CODE- 250/637: Performed by: INTERNAL MEDICINE

## 2019-11-18 PROCEDURE — 80053 COMPREHEN METABOLIC PANEL: CPT

## 2019-11-18 PROCEDURE — 74011250636 HC RX REV CODE- 250/636: Performed by: INTERNAL MEDICINE

## 2019-11-18 PROCEDURE — 74011000258 HC RX REV CODE- 258: Performed by: INTERNAL MEDICINE

## 2019-11-18 PROCEDURE — 36415 COLL VENOUS BLD VENIPUNCTURE: CPT

## 2019-11-18 RX ORDER — LEVOFLOXACIN 5 MG/ML
500 INJECTION, SOLUTION INTRAVENOUS EVERY 24 HOURS
Status: DISCONTINUED | OUTPATIENT
Start: 2019-11-18 | End: 2019-11-18

## 2019-11-18 RX ADMIN — Medication 10 ML: at 21:09

## 2019-11-18 RX ADMIN — CEFEPIME HYDROCHLORIDE 2 G: 2 INJECTION, POWDER, FOR SOLUTION INTRAVENOUS at 21:05

## 2019-11-18 RX ADMIN — Medication 10 ML: at 06:15

## 2019-11-18 RX ADMIN — Medication 1 CAPSULE: at 08:25

## 2019-11-18 RX ADMIN — VITAMIN D, TAB 1000IU (100/BT) 2 TABLET: 25 TAB at 08:24

## 2019-11-18 RX ADMIN — Medication 1 TABLET: at 08:25

## 2019-11-18 RX ADMIN — ISONIAZID 300 MG: 300 TABLET ORAL at 08:30

## 2019-11-18 RX ADMIN — DOCUSATE SODIUM 100 MG: 100 CAPSULE, LIQUID FILLED ORAL at 08:26

## 2019-11-18 RX ADMIN — RIFAMPIN 600 MG: 300 CAPSULE ORAL at 08:30

## 2019-11-18 RX ADMIN — PYRIDOXINE HCL TAB 50 MG 50 MG: 50 TAB at 08:25

## 2019-11-18 RX ADMIN — ASPIRIN 81 MG: 81 TABLET, COATED ORAL at 21:05

## 2019-11-18 RX ADMIN — LATANOPROST 1 DROP: 50 SOLUTION OPHTHALMIC at 18:00

## 2019-11-18 RX ADMIN — Medication 10 ML: at 14:00

## 2019-11-18 RX ADMIN — ENOXAPARIN SODIUM 40 MG: 40 INJECTION SUBCUTANEOUS at 21:05

## 2019-11-18 RX ADMIN — CETIRIZINE HYDROCHLORIDE 10 MG: 10 TABLET, FILM COATED ORAL at 08:25

## 2019-11-18 RX ADMIN — TIMOLOL MALEATE 1 DROP: 5 SOLUTION/ DROPS OPHTHALMIC at 09:00

## 2019-11-18 NOTE — INTERDISCIPLINARY ROUNDS
Oncology Interdisciplinary rounds were held today to discuss patient plan of care and outcomes. The following members were present: Nursing, Physician, Case Management, Pharmacy, and PT/OT Actual Length of Stay: 4 DRG GLOS: 3.7 Expected Length of Stay: 3d 16h Plan            Discharge IV ABX Infectious disease doctor following Home with family.

## 2019-11-18 NOTE — PROGRESS NOTES
Infectious Disease Progress Note    IMPRESSION:       - Fever after BCG bladder instillation- instillation on 10/16, 10/23( after 3  attempted cathetarizations) , 10/31 ( did not hold medication for long)    - Persistent fevers even while on  Cipro     -? Infection vs hypersensivity reaction to BCG     - CT chest / abd pelvis unremarkable     - Pt now reports cough , white sputum, no abdominal pain, no dysuria    - increase in ALP,     - H/o bladder ca , prostate ca           PLAN:          - Continue  mg , Rifampin 600 mg , Pyridoxine 50     - Blood & sputum for AFB    - Monitor temp, liver & renal function     - Plan of care D/w pt & spouse at length, all questions answered     -Do not recommend DC until fever free. Subjective:      Pt seen . Complains of mild cough x past 2 days . Wife reports similar cough symptoms    Review of Systems:  A comprehensive review of systems was negative except for that written in the History of Present Illness. 10 point ROS obtained . All other systems negative . Objective:     Blood pressure 135/65, pulse 95, temperature 100 °F (37.8 °C), resp. rate 18, height 5' 8\" (1.727 m), weight 197 lb 5 oz (89.5 kg), SpO2 100 %. Temp (24hrs), Av.7 °F (37.6 °C), Min:98.3 °F (36.8 °C), Max:101.7 °F (38.7 °C)      Patient Vitals for the past 24 hrs:   Temp Pulse Resp BP SpO2   19 1557 100 °F (37.8 °C) 95 18 135/65 100 %   19 0815 99.7 °F (37.6 °C) 92 18 136/77 92 %   19 0600 99 °F (37.2 °C)       19 2307 (!) 101.7 °F (38.7 °C) 99 18 144/77 94 %   19 1958 98.3 °F (36.8 °C) 92 18 130/50 95 %         Lines:  Peripheral IV:       Physical Exam:   General:  Awake, cooperative,    Eyes:  Sclera anicteric. Pupils equally round and reactive to light. Mouth/Throat: Mucous membranes normal, oral pharynx clear   Neck: Supple   Lungs:   Scattered rhonchi    CV:     Abdomen:   Soft, non-tender.  bowel sounds normal. non-distended Extremities: No cyanosis or edema   Skin: Skin color, texture, turgor normal. no acute rash or lesions   Lymph nodes: Cervical and supraclavicular normal   Musculoskeletal: No swelling or deformity   Lines/Devices:  Intact, no erythema,    Data Review:     Studies:      Lab Results   Component Value Date/Time    Culture result: NO GROWTH AFTER 14 HOURS 11/17/2019 04:51 PM    Culture result: NO GROWTH 2 DAYS 11/16/2019 10:52 AM    Culture result: NO GROWTH 5 DAYS 11/13/2019 10:09 AM    Culture result: NO GROWTH 1 DAY 11/13/2019 08:56 AM    Culture result: NO SIGNIFICANT GROWTH 05/10/2017 09:02 AM        XR Results (most recent):  Results from Hospital Encounter encounter on 11/13/19   XR CHEST PORT    Narrative INDICATION: Fever    COMPARISON: 5/10/2017    FINDINGS: AP portable imaging of the chest performed at 10:13 AM demonstrates a  stable cardiomediastinal silhouette. There is no new airspace disease or pleural  effusion. Minimal left basilar scarring is unchanged. No significant osseous  abnormalities are seen. Impression IMPRESSION: No evidence of acute cardiopulmonary process. Patient Active Problem List   Diagnosis Code    Glaucoma H40.9    Pure hypercholesterolemia E78.00    Vitamin D deficiency E55.9    Chronic kidney disease, stage 2, mildly decreased GFR N18.2    Tinea pedis B35.3    Toenail fungus B35.1    Prostate cancer ([address] C61    Malignant neoplasm of urinary bladder ([address] C67.9    Fever R50.9         ICD-10-CM ICD-9-CM    1. Fever, unspecified fever cause R50.9 780.60    2. Adverse effect of Bacillus Calmette-Guérin (BCG) vaccine, initial encounter T50. A95A E948.0        I have discussed the diagnosis with the patient and the intended plan as seen in the above orders. I have discussed medication side effects and warnings with the patient as well.     Reviewed test results at length with patient    Anti-infectives:      Isoniazid/ Rifampin- 11/17    S/p  Cipro   Enedina Hilda AGUILERA FACP

## 2019-11-18 NOTE — PROGRESS NOTES
Oncology End of Shift Note      Bedside shift change report given to Daniel Gomez RN (incoming nurse) by Lashay Finch RN (outgoing nurse) on [facility]. . Report included the following information SBAR. Shift Summary: Monitoring vs, IV hydration,meds, meals,blood cultures,IV antibiotics      Issues for Physician to Address     Patient on Cardiac Monitoring?     [] Yes  [x] No    Rhythm:                Steven Gustafson RN

## 2019-11-18 NOTE — PROGRESS NOTES
Oncology End of Shift Note      Bedside shift change report given to Arpita Ceballos RN (incoming nurse) by Izabella Tracy (outgoing nurse) on [facility]. . Report included the following information SBAR, Kardex, Intake/Output and MAR. Shift Summary: Pt remained stable throughout shift. Scheduled meds given, no PRN meds given. Dr. Bebe Cuevas wants blood cultures done if pt runs a fever over 101. Sputum cup placed in room to get sputum sample, pt aware that a stool sample is needed & will call. Pt turns self, up ad agustín to bathroom, hourly rounding completed. Issues for Physician to Address:       Patient on Cardiac Monitoring?     [] Yes  [x] No    Rhythm:          Izabella Tracy

## 2019-11-18 NOTE — PROGRESS NOTES
Problem: General Infection Care Plan (Adult and Pediatric)  Goal: Improvement in signs and symptoms of infection  Outcome: Progressing Towards Goal     Problem: Falls - Risk of  Goal: *Absence of Falls  Description  Document Jacques Erickson Fall Risk and appropriate interventions in the flowsheet.   Outcome: Progressing Towards Goal  Note:   Fall Risk Interventions:            Medication Interventions: Patient to call before getting OOB                   Problem: Breathing Pattern - Ineffective  Goal: *Absence of hypoxia  Outcome: Progressing Towards Goal

## 2019-11-18 NOTE — PROGRESS NOTES
JESSIKA PLAN    Plan is home with family once medically stable. ID MD recommendation prior to DC. Second IM letter delivered prior to DC. Wife will be able to transport Pt home in car.     3:43 PM   Medicare pt has received, reviewed, and signed 2nd IM letter informing them of their right to appeal the discharge. Signed copy has been placed on pt bedside chart.     [place], [address]

## 2019-11-18 NOTE — PROGRESS NOTES
Hospitalist Progress Note    NAME: Williams Lopez. :  1945   MRN:  [id number]     I reviewed pertinent labs/imaging and discussed plan of care with Dr. Galileo Greer who is in agreement. Assessment / Plan:  Fever  Leucopenia   Urine culture 19:  NG at 1 day. Urine AFB culture 19:  Negative  Blood culture 19:  NG at 5 days. Blood culture 19:  NG at 2 days. Blood culture 19:  NG at 14 hours. Influenza A/B 19:  Negative. AFB culture 19:  Pending. CXR 19:  No evidence of acute cardiopulmonary process. CT chest/abdomen/pelvis 19:  Clear lungs. No bowel obstruction, ileus or perforation. No intra-abdominal abscess. CRP 19:  5.57  Procalcitonin 19:  0.4  LA 19:  1.8  - ID input appreciated. - Non-toxic in appearance. - 24 hour Tmax 102.8  - Repetitive order placed for repeat blood cultures for fever 100.4.  - CT results reviewed  - Concern for BCG sepsis, urine AFB negative blood AFB pending.  - Continue ceftriaxone 1 Gm IV q24h (day #4). - Continue IVF at 75 cc/hr. - Continue isoniazid 300 mg po daily. - Continue rifampin 600 mg po daily. - Continue pyridoxine 50 mg po daily. Mild anemia  - No obvious sign of hemorrhage.   - Guaiac stools. Bladder cancer  Prostate cancer  - Patient has been receiving BCG treatments. - Patient is in a study for prostate cancer at the [place] [address]. Mild protein calorie malnutrition  - Nutrition input appreciated. - Ensure Enlive added daily. Mild asymptomatic hyponatremia, resolved  - Monitor. Elevated Alk Phos  Mild AST elevation   - Statin stopped 2/2 new ID treatment. - Daily CMP. Dyslipidemia  - Pravastatin 40 mg po daily stopped at this time. Glaucoma  - Continue latanoprost 0.005% ophthalmic solution 1 gtt ou daily. - Continue timolol 0.5% ophthalmic solution 1 gtt OD daily. 30.0 - 39.9 Obese / Body mass index is 30 kg/m².     Code status: Full  Prophylaxis: Lovenox  Recommended Disposition: Home w/Family     Subjective:     Chief Complaint / Reason for Physician Visit  Patient continues to spike fevers and states that he feels bad when he has a fever and better once it breaks. Plan of care discussed with RN. Review of Systems:  Symptom Y/N Comments  Symptom Y/N Comments   Fever/Chills Y   Chest Pain N    Poor Appetite N   Edema N    Cough N   Abdominal Pain N    Sputum N   Joint Pain N    SOB/OWEN N   Pruritis/Rash N    Nausea/vomit N   Tolerating PT/OT     Diarrhea N   Tolerating Diet Y    Constipation N   Other       Could NOT obtain due to:      Objective:     VITALS:   Last 24hrs VS reviewed since prior progress note. Most recent are:  Patient Vitals for the past 24 hrs:   Temp Pulse Resp BP SpO2   11/18/19 0815 99.7 °F (37.6 °C) 92 18 136/77 92 %   11/18/19 0600 99 °F (37.2 °C)       11/17/19 2307 (!) 101.7 °F (38.7 °C) 99 18 144/77 94 %   11/17/19 1958 98.3 °F (36.8 °C) 92 18 130/50 95 %   11/17/19 1742 98.9 °F (37.2 °C)       11/17/19 1547 (!) 102.8 °F (39.3 °C) 99 17 159/73 95 %       Intake/Output Summary (Last 24 hours) at 11/18/2019 1359  Last data filed at 11/18/2019 0815  Gross per 24 hour   Intake 2172.5 ml   Output 750 ml   Net 1422.5 ml        PHYSICAL EXAM:  General:  A/A/O X 3. NAD. Non-toxic in appearance. HEENT:  Normocephalic. Sclera anicteric. EOMI. Mucous membranes moist.   Chest:  Resps even/unlabored with symmetrical CWE. Air entry full. Lungs CTA. No use of accessory muscles. CV:  RRR. No peripheral edema. GI:  Abdomen soft/NT/ND. ABT X 4.    Neurologic:  Nonfocal.  CN II-XII grossly intact. Speech normal.     Psych:  Cooperative. No anxiety or agitation. Skin:  Intact. No rashes or jaundice.       Reviewed most current lab test results and cultures  YES  Reviewed most current radiology test results   YES  Review and summation of old records today    NO  Reviewed patient's current orders and MAR    YES  PMH/SH reviewed - no change compared to H&P  ________________________________________________________________________  Care Plan discussed with:    Comments   Patient [address]     Consultant                        Multidiciplinary team rounds were held today with , nursing, pharmacist and clinical coordinator. Patient's plan of care was discussed; medications were reviewed and discharge planning was addressed. ____________________________________________________________________________________________________  Nicolasa Perez NP     Procedures: see electronic medical records for all procedures/Xrays and details which were not copied into this note but were reviewed prior to creation of Plan. LABS:  I reviewed today's most current labs and imaging studies.   Pertinent labs include:  Recent Labs     11/18/19 0335 11/17/19  0606 11/16/19  0356   WBC 2.2* 2.6* 3.1*   HGB 11.7* 12.3 13.1   HCT 35.6* 36.4* 39.4    178 217     Recent Labs     11/18/19  0335 11/17/19  0606 11/16/19  0356    136 133*   K 3.7 3.8 3.9    105 104   CO2 24 23 23   * 91 93   BUN 9 11 12   CREA 0.93 0.91 1.04   CA 8.2* 8.0* 8.5   ALB 2.7* 2.8* 2.9*   TBILI 1.1* 0.5 0.7   SGOT 42* 45* 46*   ALT 40 40 38       Signed: Nicolasa Perez NP

## 2019-11-18 NOTE — PROGRESS NOTES
Bedside and Verbal shift change report given to GALILEA Arizmendi (oncoming nurse) by Alexy Gabriel RN (offgoing nurse). Report included the following information SBAR, Kardex, Intake/Output and MAR.

## 2019-11-19 ENCOUNTER — APPOINTMENT (OUTPATIENT)
Dept: ULTRASOUND IMAGING | Age: 74
DRG: 864 | End: 2019-11-19
Attending: INTERNAL MEDICINE
Payer: COMMERCIAL

## 2019-11-19 LAB
ALBUMIN SERPL-MCNC: 2.7 G/DL (ref 3.5–5)
ALBUMIN/GLOB SERPL: 0.7 {RATIO} (ref 1.1–2.2)
ALP SERPL-CCNC: 274 U/L (ref 45–117)
ALT SERPL-CCNC: 39 U/L (ref 12–78)
ANION GAP SERPL CALC-SCNC: 7 MMOL/L (ref 5–15)
AST SERPL-CCNC: 42 U/L (ref 15–37)
BASOPHILS # BLD: 0 K/UL (ref 0–0.1)
BASOPHILS NFR BLD: 1 % (ref 0–1)
BILIRUB SERPL-MCNC: 1.4 MG/DL (ref 0.2–1)
BUN SERPL-MCNC: 9 MG/DL (ref 6–20)
BUN/CREAT SERPL: 10 (ref 12–20)
CALCIUM SERPL-MCNC: 8 MG/DL (ref 8.5–10.1)
CHLORIDE SERPL-SCNC: 107 MMOL/L (ref 97–108)
CO2 SERPL-SCNC: 22 MMOL/L (ref 21–32)
CREAT SERPL-MCNC: 0.88 MG/DL (ref 0.7–1.3)
DIFFERENTIAL METHOD BLD: ABNORMAL
EOSINOPHIL # BLD: 0 K/UL (ref 0–0.4)
EOSINOPHIL NFR BLD: 1 % (ref 0–7)
ERYTHROCYTE [DISTWIDTH] IN BLOOD BY AUTOMATED COUNT: 13.2 % (ref 11.5–14.5)
GLOBULIN SER CALC-MCNC: 3.7 G/DL (ref 2–4)
GLUCOSE SERPL-MCNC: 95 MG/DL (ref 65–100)
HCT VFR BLD AUTO: 34 % (ref 36.6–50.3)
HEMOCCULT STL QL: NEGATIVE
HGB BLD-MCNC: 11.3 G/DL (ref 12.1–17)
IMM GRANULOCYTES # BLD AUTO: 0 K/UL (ref 0–0.04)
IMM GRANULOCYTES NFR BLD AUTO: 0 % (ref 0–0.5)
LYMPHOCYTES # BLD: 0.8 K/UL (ref 0.8–3.5)
LYMPHOCYTES NFR BLD: 32 % (ref 12–49)
MCH RBC QN AUTO: 28.3 PG (ref 26–34)
MCHC RBC AUTO-ENTMCNC: 33.2 G/DL (ref 30–36.5)
MCV RBC AUTO: 85.2 FL (ref 80–99)
MONOCYTES # BLD: 0.2 K/UL (ref 0–1)
MONOCYTES NFR BLD: 10 % (ref 5–13)
NEUTS SEG # BLD: 1.3 K/UL (ref 1.8–8)
NEUTS SEG NFR BLD: 56 % (ref 32–75)
NRBC # BLD: 0 K/UL (ref 0–0.01)
NRBC BLD-RTO: 0 PER 100 WBC
PLATELET # BLD AUTO: 153 K/UL (ref 150–400)
PMV BLD AUTO: 8.8 FL (ref 8.9–12.9)
POTASSIUM SERPL-SCNC: 3.8 MMOL/L (ref 3.5–5.1)
PROT SERPL-MCNC: 6.4 G/DL (ref 6.4–8.2)
RBC # BLD AUTO: 3.99 M/UL (ref 4.1–5.7)
SODIUM SERPL-SCNC: 136 MMOL/L (ref 136–145)
WBC # BLD AUTO: 2.4 K/UL (ref 4.1–11.1)

## 2019-11-19 PROCEDURE — 80053 COMPREHEN METABOLIC PANEL: CPT

## 2019-11-19 PROCEDURE — 76700 US EXAM ABDOM COMPLETE: CPT

## 2019-11-19 PROCEDURE — 36415 COLL VENOUS BLD VENIPUNCTURE: CPT

## 2019-11-19 PROCEDURE — 74011250636 HC RX REV CODE- 250/636: Performed by: INTERNAL MEDICINE

## 2019-11-19 PROCEDURE — 94760 N-INVAS EAR/PLS OXIMETRY 1: CPT

## 2019-11-19 PROCEDURE — 74011250636 HC RX REV CODE- 250/636: Performed by: NURSE PRACTITIONER

## 2019-11-19 PROCEDURE — 74011250637 HC RX REV CODE- 250/637: Performed by: INTERNAL MEDICINE

## 2019-11-19 PROCEDURE — 65270000015 HC RM PRIVATE ONCOLOGY

## 2019-11-19 PROCEDURE — 74011250637 HC RX REV CODE- 250/637: Performed by: HOSPITALIST

## 2019-11-19 PROCEDURE — 85025 COMPLETE CBC W/AUTO DIFF WBC: CPT

## 2019-11-19 PROCEDURE — 74011250636 HC RX REV CODE- 250/636: Performed by: HOSPITALIST

## 2019-11-19 PROCEDURE — 74011000258 HC RX REV CODE- 258: Performed by: INTERNAL MEDICINE

## 2019-11-19 PROCEDURE — 82272 OCCULT BLD FECES 1-3 TESTS: CPT

## 2019-11-19 RX ADMIN — CETIRIZINE HYDROCHLORIDE 10 MG: 10 TABLET, FILM COATED ORAL at 09:31

## 2019-11-19 RX ADMIN — Medication 1 CAPSULE: at 09:31

## 2019-11-19 RX ADMIN — Medication 10 ML: at 14:00

## 2019-11-19 RX ADMIN — ASPIRIN 81 MG: 81 TABLET, COATED ORAL at 22:18

## 2019-11-19 RX ADMIN — Medication 10 ML: at 22:10

## 2019-11-19 RX ADMIN — Medication 1 TABLET: at 09:31

## 2019-11-19 RX ADMIN — RIFAMPIN 600 MG: 300 CAPSULE ORAL at 09:32

## 2019-11-19 RX ADMIN — DOCUSATE SODIUM 100 MG: 100 CAPSULE, LIQUID FILLED ORAL at 09:31

## 2019-11-19 RX ADMIN — TIMOLOL MALEATE 1 DROP: 5 SOLUTION/ DROPS OPHTHALMIC at 09:00

## 2019-11-19 RX ADMIN — LATANOPROST 1 DROP: 50 SOLUTION OPHTHALMIC at 18:26

## 2019-11-19 RX ADMIN — CEFEPIME HYDROCHLORIDE 2 G: 2 INJECTION, POWDER, FOR SOLUTION INTRAVENOUS at 04:38

## 2019-11-19 RX ADMIN — VITAMIN D, TAB 1000IU (100/BT) 2 TABLET: 25 TAB at 09:31

## 2019-11-19 RX ADMIN — ISONIAZID 300 MG: 300 TABLET ORAL at 09:32

## 2019-11-19 RX ADMIN — ENOXAPARIN SODIUM 40 MG: 40 INJECTION SUBCUTANEOUS at 22:18

## 2019-11-19 RX ADMIN — SODIUM CHLORIDE 75 ML/HR: 900 INJECTION, SOLUTION INTRAVENOUS at 04:43

## 2019-11-19 RX ADMIN — PYRIDOXINE HCL TAB 50 MG 50 MG: 50 TAB at 09:30

## 2019-11-19 NOTE — PROGRESS NOTES
Impression: Nonexudative age-related macular degeneration, bilateral, early dry stage: H35.3131. Plan: Discussed diagnosis in detail with patient. Use of [employer] grid was explained. Will continue to monitor vision and the patient has been instructed to call with any vision changes. Problem: Knowledge Deficit  Goal: *Participate in the learning process  Outcome: Progressing Towards Goal  Goal: *Verbalize description of procedure  Outcome: Progressing Towards Goal  Goal: Interventions  Outcome: Progressing Towards Goal     Problem: General Infection Care Plan (Adult and Pediatric)  Goal: Improvement in signs and symptoms of infection  Outcome: Progressing Towards Goal     Problem: Falls - Risk of  Goal: *Absence of Falls  Description  Document Elza Phyllis Fall Risk and appropriate interventions in the flowsheet.   Outcome: Progressing Towards Goal  Note:   Fall Risk Interventions:            Medication Interventions: Evaluate medications/consider consulting pharmacy                   Problem: Breathing Pattern - Ineffective  Goal: *Absence of hypoxia  Outcome: Progressing Towards Goal

## 2019-11-19 NOTE — CONSULTS
Urology Consult Note    Service Providing Consult: Urology    Assessment:    Danae Barker is a 76 y.o. male with fevers, chills, night sweats s/p recent maintenance BCG treatments with a reported history of difficult catheterization and gross hematuria. Continued fevers despite ciprofloxacin therapy since 10/31/19, concerning for BCG sepsis. Interval: continues to spike fevers. On rifampin, isoniazid, pyridoxine. All cultures to date negative. Plan to send AFB blood & sputum. Negative CT C/A/P. Leukopenic. Recommendations:  -- Agree with ID recommendations  -- Appreciate hospitalist, ID input  -- Will not be a candidate for BCG treatments in the future based on this response    Thank you for this consult. Please contact [facility] with any further questions/concerns. We'll continue to follow. Berto Mendoza MD      Subjective    Somewhat frustrated that he's still in the hospital as an inpatient. Thinks fever curve may be getting slightly better. Used less tylenol today.        Objective     Vital signs in last 24 hours:  Visit Vitals  /65 (BP 1 Location: Left arm, BP Patient Position: At rest)   Pulse 95   Temp 100 °F (37.8 °C)   Resp 18   Ht 5' 8\" (1.727 m)   Wt 89.5 kg (197 lb 5 oz)   SpO2 100%   BMI 30.00 kg/m²       Intake/Output last 3 shifts:  Date 11/17/19 1900 - 11/18/19 0659 11/18/19 0700 - 11/19/19 0659   Shift 6622-7058 24 Hour Total 0529-2050 9865-8138 24 Hour Total   INTAKE   I.V.(mL/kg/hr) 2003.8 2003.8 168.8(0.2)  168.8     Volume (0.9% sodium chloride infusion) 2003.8 2003.8 168.8  168.8   Shift Total(mL/kg) 2003.8(22.4) 2003.8(22.4) 168.8(1.9)  168.8(1.9)   OUTPUT   Urine(mL/kg/hr) 750 750        Urine Voided 750 750      Shift Total(mL/kg) 750(8.4) 750(8.4)      NET 1253.8 1253.8 168.8  168.8   Weight (kg) 89.5 89.5 89.5 89.5 89.5           Physical Exam  General: NAD, A&O, resting, appropriate  HEENT: NC/AT, EOMI, MMM  Pulmonary: Normal work of breathing on RA  Cardiovascular: Regular rate & rhythm, HDS, adequate peripheral perfusion  Abdomen: soft, NTTP, nondistended, no suprapubic fullness or tenderness  : voiding spontaneously, no CVA tenderness  Extremities: warm and well perfused, no edema  Neuro: Appropriate, no focal neurological deficits    Most Recent Labs:  Lab Results   Component Value Date/Time    WBC 2.2 (L) 11/18/2019 03:35 AM    HGB 11.7 (L) 11/18/2019 03:35 AM    HCT 35.6 (L) 11/18/2019 03:35 AM    PLATELET 378 70/38/8711 03:35 AM    MCV 85.6 11/18/2019 03:35 AM        Lab Results   Component Value Date/Time    Sodium 138 11/18/2019 03:35 AM    Potassium 3.7 11/18/2019 03:35 AM    Chloride 107 11/18/2019 03:35 AM    CO2 24 11/18/2019 03:35 AM    Anion gap 7 11/18/2019 03:35 AM    Glucose 106 (H) 11/18/2019 03:35 AM    BUN 9 11/18/2019 03:35 AM    Creatinine 0.93 11/18/2019 03:35 AM    BUN/Creatinine ratio 10 (L) 11/18/2019 03:35 AM    GFR est AA >60 11/18/2019 03:35 AM    GFR est non-AA >60 11/18/2019 03:35 AM    Calcium 8.2 (L) 11/18/2019 03:35 AM    Bilirubin, total 1.1 (H) 11/18/2019 03:35 AM    AST (SGOT) 42 (H) 11/18/2019 03:35 AM    Alk.  phosphatase 263 (H) 11/18/2019 03:35 AM    Protein, total 6.4 11/18/2019 03:35 AM    Albumin 2.7 (L) 11/18/2019 03:35 AM    Globulin 3.7 11/18/2019 03:35 AM    A-G Ratio 0.7 (L) 11/18/2019 03:35 AM    ALT (SGPT) 40 11/18/2019 03:35 AM        Lab Results   Component Value Date/Time    Prostate Specific Ag 8.5 (H) 12/19/2018 08:11 AM    Prostate Specific Ag 4.4 (H) 10/05/2012 08:59 AM    Prostate Specific Ag 5.2 (H) 10/03/2011 09:02 AM    PSA, External 6.10 06/03/2016        COAGS:  No results found for: APTT, PTP, INR, INREXT, INREXT    No results found for: HBA1C, HGBE8, FJG9CAHY, QJT4KGHO, IRM9UYMB     Lab Results   Component Value Date/Time    CK 86 02/02/2014 04:10 PM    CK - MB 0.8 02/02/2014 04:10 PM    CK-MB Index 0.9 02/02/2014 04:10 PM    Troponin-I, Qt. <0.04 10/09/2015 06:22 PM          Urine/Blood Cultures:  Results     Procedure Component Value Units Date/Time    AFB CULTURE + SMEAR W/RFLX PCR AND ID [[id number]]     Order Status:  Sent     AFB CULTURE + SMEAR W/RFLX PCR AND ID [[id number]]     Order Status:  Sent     AFB CULTURE + SMEAR W/RFLX PCR AND ID [[id number]]     Order Status:  Sent     CULTURE, BLOOD [[id number]] Collected:  11/17/19 1651    Order Status:  Completed Specimen:  Blood Updated:  11/18/19 0742     Special Requests: NO SPECIAL REQUESTS        Culture result: NO GROWTH AFTER 14 HOURS       CULTURE, BLOOD, PAIRED [[id number]]     Order Status:  Sent Specimen:  Blood     AFB CULTURE + SMEAR W/RFLX ID FROM CULTURE [[id number]] Collected:  11/16/19 2031    Order Status:  Completed Specimen:  Miscellaneous sample Updated:  11/18/19 1735     Source BLOOD        AFB Specimen processing Concentration     Comment: (NOTE)  Performed At: [facility]  [address] [id number]  Fabián Villagran MD HO:[id number]          Acid Fast Smear TEST NOT PERFORMED        Comment: (NOTE)  The acid-fast bacilli (AFB) smear will not be performed due to the  specimen source (blood) or submission of an insufficient quantity  of specimen.           Acid Fast Culture PENDING    CULTURE, BLOOD, PAIRED [[id number]] Collected:  11/16/19 1052    Order Status:  Completed Specimen:  Blood Updated:  11/18/19 0742     Special Requests: NO SPECIAL REQUESTS        Culture result: NO GROWTH 2 DAYS       CULTURE, BLOOD, PAIRED [[id number]]     Order Status:  Canceled Specimen:  Blood     AFB CULTURE + SMEAR W/RFLX ID FROM CULTURE [[id number]] Collected:  11/14/19 1101    Order Status:  Completed Specimen:  Miscellaneous sample Updated:  11/15/19 2135     Source URINE, CLEAN     AFB Specimen processing Concentration     Acid Fast Smear NEGATIVE         Comment: (NOTE)  Performed At: [facility]  [employer] [address] [place] [id number]  Fabián Villagran MD SV:[id number]          Acid Fast Culture PENDING INFLUENZA A & B AG (RAPID TEST) [[id number]] Collected:  11/13/19 1009    Order Status:  Completed Specimen:  Nasopharyngeal from Nasal washing Updated:  11/13/19 1051     Influenza A Antigen NEGATIVE         Influenza B Antigen NEGATIVE        CULTURE, BLOOD, PAIRED [[id number]] Collected:  11/13/19 1009    Order Status:  Completed Specimen:  Blood Updated:  11/18/19 0742     Special Requests: NO SPECIAL REQUESTS        Culture result: NO GROWTH 5 DAYS       CULTURE, URINE [[id number]] Collected:  11/13/19 0856    Order Status:  Completed Specimen:  Urine Updated:  11/14/19 1529     Special Requests: --        NO SPECIAL REQUESTS  Reflexed from [id number]       Culture result: NO GROWTH 1 DAY                  IMAGING:  CT C/A/P 11/16/19 - IMPRESSION: Clear lungs. No bowel obstruction, ileus or perforation.  No  intra-abdominal abscess.

## 2019-11-19 NOTE — PROGRESS NOTES
I have reviewed pertinent labs and imaging, discussed/agreed on the plan of care with Dr. Evy Pearl. Hospitalist Progress Note    NAME: Suman Costello. :  1945   MRN:  [id number]       Interim Hospital Summary: 76 y.o. male whom presented on 2019 with fever for 13 days. He has been getting treatment for bladder cancer with BCG for the past 3 years and is currently on prophylactic treatment. Last treatment was on . The next day he started having fever 100 to 101. Was seen at urologist office and started on Cipro for UTI. He took Cipro for 7 days and finished it last Friday. He continued to have fever and saw urologist again 3 days ago and was restarted on Cipro. His fever curve has been increasing going from 100-101 to 102.4 last night and again this morning. The fever would resolve with Tylenol. Has mild headache with fever but no neck pain or stiffness. He has been having night sweats for the past 13 days along with decreased appetite and weight loss of 9 pounds. With the Cipro he has had some nausea, voice hoarseness and insomnia. He denies any vomiting abdominal pain, no dysuria or urinary frequency, no change in the color of his urine. Denies any back pain no joint pain or rash. He has not had any foreign travel in the past 6 months. He did have a sore spot in the back of his left ear for the past 3 days but it is getting better. Assessment / Plan:  Fever  Leucopenia   Urine culture 19:  NG at 1 day. Urine AFB culture 19:  Negative  Blood culture 19:  NG at 5 days. Blood culture 19:  NG at 2 days. Blood culture 19:  NG at 2 days. Influenza A/B 19:  Negative. AFB culture 19:  Pending collection. CXR 19:  No evidence of acute cardiopulmonary process. CT chest/abdomen/pelvis 19:  Clear lungs. No bowel obstruction, ileus or perforation. No intra-abdominal abscess.   CRP 19: 5.57  Procalcitonin 11/16/19: 0.4  LA 11/16/19:  1.8  - ID following.  - Non-toxic in appearance. - 24 hour Tmax 100  - Repetitive order placed for repeat blood cultures for fever 100.4.  - CT results reviewed  - Concern for BCG sepsis, urine AFB negative blood AFB pending.  - 11/19/19 Ceftriaxone stopped due to rash by ID   - Continue IVF at 75 cc/hr. - Continue isoniazid 300 mg po daily. - Continue rifampin 600 mg po daily. - Continue pyridoxine 50 mg po daily.       Mild anemia  - No obvious sign of hemorrhage.   - 11/18/19 Guaiac stools (-). - hgb 11.3        Bladder cancer  Prostate cancer  - Patient has been receiving BCG treatments. - Patient is in a study for prostate cancer at the [place] [id number].  - Urology following        Mild protein calorie malnutrition  - Nutrition input appreciated. - Ensure Enlive added daily.        Mild asymptomatic hyponatremia, resolved  -   - Monitoring        Elevated Alk Phos  Mild AST elevation   - Statin stopped 2/2 new ID treatment. - Daily CMP.    Dyslipidemia  - Pravastatin 40 mg po daily stopped at this time.         Glaucoma  - Continue latanoprost 0.005% ophthalmic solution 1 gtt ou daily. - Continue timolol 0.5% ophthalmic solution 1 gtt OD daily. 30.0 - 39.9 Obese / Body mass index is 30 kg/m². Code status: Full  Prophylaxis: Lovenox  Recommended Disposition: Home w/Family     Subjective:     Chief Complaint / Reason for Physician Visit  F/U fevers \"I haven't had a fever all night and most of yesterday \". Discussed with RN events overnight.      Review of Systems:  Symptom Y/N Comments  Symptom Y/N Comments   Fever/Chills N Afebrile >24 hours  Chest Pain N    Poor Appetite N   Edema N    Cough N   Abdominal Pain N    Sputum    Joint Pain N    SOB/OWEN N   Pruritis/Rash N    Nausea/vomit N   Tolerating PT/OT     Diarrhea N   Tolerating Diet Y    Constipation N   Other       Could NOT obtain due to:      Objective:     VITALS:   Last 24hrs VS reviewed since prior progress note. Most recent are:  Patient Vitals for the past 24 hrs:   Temp Pulse Resp BP SpO2   11/19/19 1554 99.5 °F (37.5 °C) 81 16 139/71 94 %   11/19/19 0759 99.3 °F (37.4 °C) 77 16 147/69 96 %   11/19/19 0440 98.5 °F (36.9 °C) 73 18 126/61 100 %   11/18/19 2104 99.5 °F (37.5 °C) 74 18 150/70 100 %     No intake or output data in the 24 hours ending 11/19/19 1604     PHYSICAL EXAM:  General: Alert, cooperative, no acute distress    EENT:  EOMI. Anicteric sclerae. MMM  Resp:  CTA bilaterally, no wheezing or rales. No accessory muscle use  CV:  Regular  rhythm,  No edema  GI:  Soft, Non distended, Non tender. +Bowel sounds  Neurologic:  Alert and oriented X 3, normal speech,   Psych:   Good insight. Not anxious nor agitated  Skin:  No rashes. No jaundice    Reviewed most current lab test results and cultures  YES  Reviewed most current radiology test results   YES  Review and summation of old records today    NO  Reviewed patient's current orders and MAR    YES  PMH/ reviewed - no change compared to H&P  ________________________________________________________________________  Care Plan discussed with:    Comments   Patient Y    Family  Y Wife at bedside   RN Y    Care Manager     Consultant                        Multidiciplinary team rounds were held today with , nursing, pharmacist and clinical coordinator. Patient's plan of care was discussed; medications were reviewed and discharge planning was addressed. ________________________________________________________________________        Comments   >50% of visit spent in counseling and coordination of care     ________________________________________________________________________  Bethel Reid NP     Procedures: see electronic medical records for all procedures/Xrays and details which were not copied into this note but were reviewed prior to creation of Plan.       LABS:  I reviewed today's most current labs and imaging studies.   Pertinent labs include:  Recent Labs     11/19/19  0444 11/18/19  0335 11/17/19  0606   WBC 2.4* 2.2* 2.6*   HGB 11.3* 11.7* 12.3   HCT 34.0* 35.6* 36.4*    163 178     Recent Labs     11/19/19 0444 11/18/19  0335 11/17/19  0606    138 136   K 3.8 3.7 3.8    107 105   CO2 22 24 23   GLU 95 106* 91   BUN 9 9 11   CREA 0.88 0.93 0.91   CA 8.0* 8.2* 8.0*   ALB 2.7* 2.7* 2.8*   TBILI 1.4* 1.1* 0.5   SGOT 42* 42* 45*   ALT 39 40 40       Signed: )Deberah Oppenheim, NP

## 2019-11-19 NOTE — PROGRESS NOTES
Fever down. Feels markedly better. Will contact patient to arrange OP F/U.     Thank you    Tere Schulz M.D.  [phone]

## 2019-11-20 ENCOUNTER — APPOINTMENT (OUTPATIENT)
Dept: ULTRASOUND IMAGING | Age: 74
DRG: 864 | End: 2019-11-20
Attending: INTERNAL MEDICINE
Payer: COMMERCIAL

## 2019-11-20 VITALS
BODY MASS INDEX: 29.9 KG/M2 | TEMPERATURE: 98.5 F | WEIGHT: 197.31 LBS | SYSTOLIC BLOOD PRESSURE: 141 MMHG | HEART RATE: 81 BPM | RESPIRATION RATE: 16 BRPM | HEIGHT: 68 IN | OXYGEN SATURATION: 98 % | DIASTOLIC BLOOD PRESSURE: 52 MMHG

## 2019-11-20 PROBLEM — E44.1 MILD PROTEIN-CALORIE MALNUTRITION (HCC): Status: ACTIVE | Noted: 2019-11-20

## 2019-11-20 PROBLEM — R74.8 ELEVATED ALKALINE PHOSPHATASE LEVEL: Status: ACTIVE | Noted: 2019-11-20

## 2019-11-20 PROBLEM — R74.01 ELEVATED AST (SGOT): Status: ACTIVE | Noted: 2019-11-20

## 2019-11-20 PROBLEM — E78.5 DYSLIPIDEMIA: Status: ACTIVE | Noted: 2019-11-20

## 2019-11-20 LAB
ALBUMIN SERPL-MCNC: 2.9 G/DL (ref 3.5–5)
ALBUMIN SERPL-MCNC: 3.1 G/DL (ref 3.5–5)
ALBUMIN/GLOB SERPL: 0.7 {RATIO} (ref 1.1–2.2)
ALBUMIN/GLOB SERPL: 0.7 {RATIO} (ref 1.1–2.2)
ALP SERPL-CCNC: 322 U/L (ref 45–117)
ALP SERPL-CCNC: 341 U/L (ref 45–117)
ALT SERPL-CCNC: 38 U/L (ref 12–78)
ALT SERPL-CCNC: 43 U/L (ref 12–78)
ANION GAP SERPL CALC-SCNC: 7 MMOL/L (ref 5–15)
ANION GAP SERPL CALC-SCNC: 7 MMOL/L (ref 5–15)
AST SERPL-CCNC: 46 U/L (ref 15–37)
AST SERPL-CCNC: 50 U/L (ref 15–37)
BASOPHILS # BLD: 0 K/UL (ref 0–0.1)
BASOPHILS NFR BLD: 1 % (ref 0–1)
BILIRUB SERPL-MCNC: 0.8 MG/DL (ref 0.2–1)
BILIRUB SERPL-MCNC: 0.8 MG/DL (ref 0.2–1)
BUN SERPL-MCNC: 11 MG/DL (ref 6–20)
BUN SERPL-MCNC: 11 MG/DL (ref 6–20)
BUN/CREAT SERPL: 11 (ref 12–20)
BUN/CREAT SERPL: 12 (ref 12–20)
CALCIUM SERPL-MCNC: 8.5 MG/DL (ref 8.5–10.1)
CALCIUM SERPL-MCNC: 8.6 MG/DL (ref 8.5–10.1)
CHLORIDE SERPL-SCNC: 100 MMOL/L (ref 97–108)
CHLORIDE SERPL-SCNC: 99 MMOL/L (ref 97–108)
CO2 SERPL-SCNC: 25 MMOL/L (ref 21–32)
CO2 SERPL-SCNC: 25 MMOL/L (ref 21–32)
CREAT SERPL-MCNC: 0.92 MG/DL (ref 0.7–1.3)
CREAT SERPL-MCNC: 0.97 MG/DL (ref 0.7–1.3)
DIFFERENTIAL METHOD BLD: ABNORMAL
EOSINOPHIL # BLD: 0 K/UL (ref 0–0.4)
EOSINOPHIL NFR BLD: 1 % (ref 0–7)
ERYTHROCYTE [DISTWIDTH] IN BLOOD BY AUTOMATED COUNT: 13.4 % (ref 11.5–14.5)
FERRITIN SERPL-MCNC: 504 NG/ML (ref 26–388)
GGT SERPL-CCNC: 652 U/L (ref 15–85)
GLOBULIN SER CALC-MCNC: 4.1 G/DL (ref 2–4)
GLOBULIN SER CALC-MCNC: 4.3 G/DL (ref 2–4)
GLUCOSE SERPL-MCNC: 100 MG/DL (ref 65–100)
GLUCOSE SERPL-MCNC: 99 MG/DL (ref 65–100)
HCT VFR BLD AUTO: 36.6 % (ref 36.6–50.3)
HGB BLD-MCNC: 12 G/DL (ref 12.1–17)
IMM GRANULOCYTES # BLD AUTO: 0 K/UL (ref 0–0.04)
IMM GRANULOCYTES NFR BLD AUTO: 0 % (ref 0–0.5)
INR PPP: 1 (ref 0.9–1.1)
IRON SATN MFR SERPL: 13 % (ref 20–50)
IRON SERPL-MCNC: 32 UG/DL (ref 35–150)
LYMPHOCYTES # BLD: 0.5 K/UL (ref 0.8–3.5)
LYMPHOCYTES NFR BLD: 18 % (ref 12–49)
MCH RBC QN AUTO: 28.1 PG (ref 26–34)
MCHC RBC AUTO-ENTMCNC: 32.8 G/DL (ref 30–36.5)
MCV RBC AUTO: 85.7 FL (ref 80–99)
MONOCYTES # BLD: 0.2 K/UL (ref 0–1)
MONOCYTES NFR BLD: 9 % (ref 5–13)
NEUTS SEG # BLD: 2 K/UL (ref 1.8–8)
NEUTS SEG NFR BLD: 71 % (ref 32–75)
NRBC # BLD: 0 K/UL (ref 0–0.01)
NRBC BLD-RTO: 0 PER 100 WBC
PLATELET # BLD AUTO: 174 K/UL (ref 150–400)
PMV BLD AUTO: 9.3 FL (ref 8.9–12.9)
POTASSIUM SERPL-SCNC: 3.7 MMOL/L (ref 3.5–5.1)
POTASSIUM SERPL-SCNC: 3.8 MMOL/L (ref 3.5–5.1)
PROT SERPL-MCNC: 7 G/DL (ref 6.4–8.2)
PROT SERPL-MCNC: 7.4 G/DL (ref 6.4–8.2)
PROTHROMBIN TIME: 10.3 SEC (ref 9–11.1)
RBC # BLD AUTO: 4.27 M/UL (ref 4.1–5.7)
RBC MORPH BLD: ABNORMAL
SODIUM SERPL-SCNC: 131 MMOL/L (ref 136–145)
SODIUM SERPL-SCNC: 132 MMOL/L (ref 136–145)
TIBC SERPL-MCNC: 243 UG/DL (ref 250–450)
WBC # BLD AUTO: 2.7 K/UL (ref 4.1–11.1)

## 2019-11-20 PROCEDURE — 82728 ASSAY OF FERRITIN: CPT

## 2019-11-20 PROCEDURE — 84080 ASSAY ALKALINE PHOSPHATASES: CPT

## 2019-11-20 PROCEDURE — 83516 IMMUNOASSAY NONANTIBODY: CPT

## 2019-11-20 PROCEDURE — 82103 ALPHA-1-ANTITRYPSIN TOTAL: CPT

## 2019-11-20 PROCEDURE — 94760 N-INVAS EAR/PLS OXIMETRY 1: CPT

## 2019-11-20 PROCEDURE — 82390 ASSAY OF CERULOPLASMIN: CPT

## 2019-11-20 PROCEDURE — 85610 PROTHROMBIN TIME: CPT

## 2019-11-20 PROCEDURE — 74011250637 HC RX REV CODE- 250/637: Performed by: INTERNAL MEDICINE

## 2019-11-20 PROCEDURE — 76775 US EXAM ABDO BACK WALL LIM: CPT

## 2019-11-20 PROCEDURE — 83540 ASSAY OF IRON: CPT

## 2019-11-20 PROCEDURE — 74011250637 HC RX REV CODE- 250/637: Performed by: HOSPITALIST

## 2019-11-20 PROCEDURE — 80053 COMPREHEN METABOLIC PANEL: CPT

## 2019-11-20 PROCEDURE — 85025 COMPLETE CBC W/AUTO DIFF WBC: CPT

## 2019-11-20 PROCEDURE — 36415 COLL VENOUS BLD VENIPUNCTURE: CPT

## 2019-11-20 PROCEDURE — 86038 ANTINUCLEAR ANTIBODIES: CPT

## 2019-11-20 PROCEDURE — 82977 ASSAY OF GGT: CPT

## 2019-11-20 RX ORDER — ACETAMINOPHEN 325 MG/1
325 TABLET ORAL
Qty: 30 TAB | Refills: 0 | Status: SHIPPED | OUTPATIENT
Start: 2019-11-20

## 2019-11-20 RX ORDER — RIFAMPIN 300 MG/1
600 CAPSULE ORAL
Status: DISCONTINUED | OUTPATIENT
Start: 2019-11-20 | End: 2019-11-20 | Stop reason: HOSPADM

## 2019-11-20 RX ORDER — LANOLIN ALCOHOL/MO/W.PET/CERES
50 CREAM (GRAM) TOPICAL DAILY
Status: SHIPPED | COMMUNITY
Start: 2019-11-21 | End: 2019-12-03

## 2019-11-20 RX ORDER — ISONIAZID 300 MG/1
300 TABLET ORAL DAILY
Qty: 10 TAB | Refills: 0 | Status: SHIPPED | OUTPATIENT
Start: 2019-11-21 | End: 2019-12-01

## 2019-11-20 RX ORDER — RIFAMPIN 300 MG/1
600 CAPSULE ORAL
Qty: 20 CAP | Refills: 0 | Status: SHIPPED | OUTPATIENT
Start: 2019-11-21 | End: 2019-12-01

## 2019-11-20 RX ORDER — LANOLIN ALCOHOL/MO/W.PET/CERES
3 CREAM (GRAM) TOPICAL
Status: SHIPPED | COMMUNITY
Start: 2019-11-20 | End: 2019-11-22 | Stop reason: SINTOL

## 2019-11-20 RX ADMIN — ISONIAZID 300 MG: 300 TABLET ORAL at 09:29

## 2019-11-20 RX ADMIN — Medication 1 TABLET: at 09:28

## 2019-11-20 RX ADMIN — Medication 1 CAPSULE: at 09:28

## 2019-11-20 RX ADMIN — VITAMIN D, TAB 1000IU (100/BT) 2 TABLET: 25 TAB at 09:28

## 2019-11-20 RX ADMIN — CETIRIZINE HYDROCHLORIDE 10 MG: 10 TABLET, FILM COATED ORAL at 09:28

## 2019-11-20 RX ADMIN — PYRIDOXINE HCL TAB 50 MG 50 MG: 50 TAB at 09:28

## 2019-11-20 RX ADMIN — RIFAMPIN 600 MG: 300 CAPSULE ORAL at 12:05

## 2019-11-20 RX ADMIN — Medication 10 ML: at 06:48

## 2019-11-20 RX ADMIN — TIMOLOL MALEATE 1 DROP: 5 SOLUTION/ DROPS OPHTHALMIC at 09:28

## 2019-11-20 NOTE — PROGRESS NOTES
Infectious Disease Progress Note    IMPRESSION:       - Fever after BCG bladder instillation- instillations on 10/16, 10/23( after 3  attempted cathetarizations) , 10/31 ( did not hold medication for long)    - Persistent fevers, night sweats  even while on  Cipro     -? Infection vs hypersensivity reaction to BCG     - CT chest / abd pelvis unremarkable     - S/p low grade temp last night 100.4 at 2045, s/p infiltration of IV line & extravasation of fluid into UE    - Further increase in ALP, up to 279, 249 at time of admission, minimal increase AST, ALT normal    - US abdomen -Increased echogenicity liver possibly reflecting underlying liver  parenchymal disease or steatosis.   2.  Multiple bilateral renal cysts.   3.  Gallbladder sludge without evidence of gallbladder wall thickening or  pericholecystic fluid.    - H/o bladder ca , prostate ca   Complications of BCG therapy are inclusive but not limited to the following : cystitis, pneumonitis , prostatitis ,epidydymitis, granulomatous  hepatitis, osteomyelitis, disseminated infection & sepsis. PLAN:          - Continue  mg po daily  ,Rifampin 600 mg po daily , Pyridoxine 50 mg po daily  plan  total  2 weeks end date 12/1    - Keep L/UE elevated    - Monitor temp, liver & renal function, pt to have eye exam as out pt, pt to monitor temps. , D/w pt & wife again    - GI evaluation- appreciate Dr Dg López' input    - Ultrasound of prostate - result pending      - Spoke with office of Dr Coello So, D/w Dr Nicole Lazo , he is aware & is agreeable to 2 weeks of  INH/ Rifampin provided pt is fever free. 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 Denies new complaints . Feels better overall.  Low grade temps 100.4 last night   Infiltration of fluid into LUE , swelling noted, no redness , warmth, pain  Review of Systems:  A comprehensive review of systems was negative except for that written in the History of Present Illness. 10 point ROS obtained . All other systems negative . Objective:     Blood pressure 144/64, pulse 87, temperature 98.2 °F (36.8 °C), resp. rate 16, height 5' 8\" (1.727 m), weight 197 lb 5 oz (89.5 kg), SpO2 96 %. Temp (24hrs), Av.5 °F (37.5 °C), Min:98.2 °F (36.8 °C), Max:100.4 °F (38 °C)      Patient Vitals for the past 24 hrs:   Temp Pulse Resp BP SpO2   19 0745 98.2 °F (36.8 °C) 87 16 144/64 96 %   19 2351 99.9 °F (37.7 °C) 83 18 134/61 95 %   19 2046 100.4 °F (38 °C) 85 18 147/65 100 %   19 1554 99.5 °F (37.5 °C) 81 16 139/71 94 %         Lines:  Peripheral IV:       Physical Exam:   General:  Awake, cooperative,    Eyes:  Sclera anicteric. Pupils equally round and reactive to light. Mouth/Throat: Mucous membranes normal, oral pharynx clear   Neck: Supple   Lungs:   Scattered rhonchi    CV:     Abdomen:   Soft, non-tender.  bowel sounds normal. non-distended   Extremities: No cyanosis or edema   Skin: Skin color, texture, turgor normal. no acute rash or lesions   Lymph nodes: Cervical and supraclavicular normal   Musculoskeletal: LUE swelling , no erythema , tenderness    Lines/Devices:  Intact, no erythema,    Data Review:     Studies:      Lab Results   Component Value Date/Time    Culture result: NO GROWTH 3 DAYS 2019 04:51 PM    Culture result: NO GROWTH 4 DAYS 2019 10:52 AM    Culture result: NO GROWTH 5 DAYS 2019 10:09 AM    Culture result: NO GROWTH 1 DAY 2019 08:56 AM    Culture result: NO SIGNIFICANT GROWTH 05/10/2017 09:02 AM        XR Results (most recent):  Results from Hospital Encounter encounter on 19   XR CHEST PORT    Narrative INDICATION: Fever    COMPARISON: 5/10/2017    FINDINGS: AP portable imaging of the chest performed at 10:13 AM demonstrates a  stable cardiomediastinal silhouette. There is no new airspace disease or pleural  effusion. Minimal left basilar scarring is unchanged. No significant osseous  abnormalities are seen. Impression IMPRESSION: No evidence of acute cardiopulmonary process. Patient Active Problem List   Diagnosis Code    Glaucoma H40.9    Pure hypercholesterolemia E78.00    Vitamin D deficiency E55.9    Chronic kidney disease, stage 2, mildly decreased GFR N18.2    Tinea pedis B35.3    Toenail fungus B35.1    Prostate cancer ([facility] [address] C61    Malignant neoplasm of urinary bladder ([facility] [address] C67.9    Fever R50.9         ICD-10-CM ICD-9-CM    1. Fever, unspecified fever cause R50.9 780.60    2. Adverse effect of Bacillus Calmette-Guérin (BCG) vaccine, initial encounter T50. A95A E948.0        I have discussed the diagnosis with the patient and the intended plan as seen in the above orders. I have discussed medication side effects and warnings with the patient as well.     Reviewed test results at length with patient    Anti-infectives:      Isoniazid/ Rifampin/ pyridoxine - 11/17    S/p Janine Lacy MD FACP

## 2019-11-20 NOTE — CONSULTS
Gastroenterology Consultation Note  Dr. Venecia Pascual    NAME: Marquis Haile. : 1945 MRN: [id number]   PCP: Cathleen Kramer MD  Date/Time:  2019 8:03 AM  Subjective:   REASON FOR CONSULT:      Estrella Fermin is a 76 y.o.  male who I was asked to see for liver enzyme elevation, specifically alk phos. Patient is being cared for by Urology for h/o bladder CA and has undergone 27 BCG bladder instillations with the last one on 10/31/19 and was admitted a week ago due to persistent fevers despite CIPRO. Patient had been febrile since 10/31/19 up to 103 and was eventually admitted a week ago. Had negative blood cultures and was seen by ID and started on INH and Rifampin for possible mycobacterial infection and this was started on 19. His ALK PHOS was 250 on 19 and T bili was normal with minimal elevation in AST 46 and normal ALT. He is a nondrinker, nonsmoker active elderly male, still a practicing . No TB exposures. No h/o risk factors for viral hepatitis and his Acute hepatitis panel was neg. He had an U/S showing sludge in GB and a normal biliary tree. There was increased hepatic echogenicity as well. His T bili began to increase on 19 after starting INH that is to be taken for 2 weeks. No fever spikes above 100 since starting on INH on 19.       Past Medical History:   Diagnosis Date    [facility] 14    [facility]    Arthritis     HANDS    Bladder cancer ([facility] [address] 2016    Dr. Gretchen Broderick 8/10/16 f/u note path report too    Bladder cancer ([facility] [address] 2016    Chronic kidney disease     Chronic kidney disease, stage II (mild)     Diverticula of colon 12    ima [facility]    Diverticulosis of colon     Elevated PSA 10/2011    Dr Gretchen Broderick    Glaucoma     16 note from [address] [place] [address] Hypercholesterolemia     near syncope 10/06/15    notes from [place] [address] Other ill-defined conditions(799.89)     in clinical trial at [place] [address] for prostate cancer    Pneumonia     Prostate cancer ([facility] [address] 11/2012      6/3/16 [facility] notes  [facility] 8/10/16 note    Prostatitis     Maulikeman    Prostatitis 07/2014    aftab    Tinea cruris 3/09    and rosacea    Varicose veins     Vitamin D deficiency       Past Surgical History:   Procedure Laterality Date    ABDOMEN SURGERY PROC UNLISTED  00    umbilical hernia    CT HEART W/O CONT WITH CALCIUM  3/3/04    score 65.57    ENDOSCOPY, COLON, DIAGNOSTIC  8/06    5 yrs    HX APPENDECTOMY      HX OTHER SURGICAL  7/18/12    colonoscopy 5 year repeat    HX OTHER SURGICAL  4/2012    cyst on groin removed Dr Dave Simon    [facility]  10/21/15    echocardiogram due to syncope    HX UROLOGICAL  '90    hydrocele    HX UROLOGICAL      TURB X 3    HX UROLOGICAL  05/2017    Biopsy and tumor removed from his bladder at [facility]     Social History     Tobacco Use    Smoking status: Never Smoker    Smokeless tobacco: Never Used   Substance Use Topics    Alcohol use: No      Family History   Problem Relation Age of Onset    Heart Disease Mother     Cancer Mother         lung    Asthma Mother     Diabetes Father     Thyroid Disease Sister     Cancer Sister 61        breast and throat    Stroke Paternal Grandfather     MS Sister     Cancer Sister         THYROID    Anesth Problems Neg Hx       Allergies   Allergen Reactions    Nsaids (Non-Steroidal Anti-Inflammatory Drug) Other (comments)     As per physcian order due to glaucoma and CKD      Home Medications:  Prior to Admission Medications   Prescriptions Last Dose Informant Patient Reported? Taking? Bifidobacterium Infantis (ALIGN) 4 mg cap 11/12/2019 at Unknown time Self Yes Yes   Sig: Take 1 Tab by mouth daily. PRENATAL VITS/IRON FUM/FOLIC (M-VIT PO) 91/93/6161 at Unknown time Self Yes Yes   Sig: Take 1 Tab by mouth daily.  Multi vit    acetaminophen (TYLENOL EXTRA STRENGTH) 500 mg tablet 11/13/2019 at Unknown time Self Yes Yes   Sig: Take 1,000 mg by mouth every six (6) hours as needed for Pain. aspirin delayed-release 81 mg tablet 11/12/2019 at Unknown time Self Yes Yes   Sig: Take 81 mg by mouth daily. cetirizine (ZYRTEC) 10 mg tablet 11/12/2019 at Unknown time Self Yes Yes   Sig: Take 10 mg by mouth daily. cholecalciferol, vitamin D3, (VITAMIN D3) 2,000 unit tab 11/12/2019 at Unknown time Self Yes Yes   Sig: Take 1 Tab by mouth daily. simvastatin (ZOCOR) 20 mg tablet 11/12/2019 at Unknown time Self No Yes   Sig: TAKE 1 TABLET BY MOUTH EVERYDAY AT BEDTIME(NEEDS APPT)   timolol maleate (ISTALOL) 0.5 % drpd ophthalmic solution 11/12/2019 at Unknown time Self Yes Yes   Sig: Administer 1 Drop to right eye daily. travoprost (TRAVATAN Z) 0.004 % ophthalmic solution 11/12/2019 at Unknown time Self Yes Yes   Sig: Administer 1 Drop to both eyes every evening.       Facility-Administered Medications: None     Hospital medications:  Current Facility-Administered Medications   Medication Dose Route Frequency    0.9% sodium chloride infusion  75 mL/hr IntraVENous CONTINUOUS    isoniazid (NYDRAZID) tablet 300 mg  300 mg Oral DAILY    rifAMPin (RIFADIN) capsule 600 mg  600 mg Oral ACB    pyridoxine (vitamin B6) (VITAMIN B-6) tablet 50 mg  50 mg Oral DAILY    acetaminophen (TYLENOL) tablet 325 mg  325 mg Oral Q6H PRN    sodium chloride (NS) flush 5-40 mL  5-40 mL IntraVENous Q8H    sodium chloride (NS) flush 5-40 mL  5-40 mL IntraVENous PRN    ondansetron (ZOFRAN) injection 4 mg  4 mg IntraVENous Q6H PRN    docusate sodium (COLACE) capsule 100 mg  100 mg Oral BID    enoxaparin (LOVENOX) injection 40 mg  40 mg SubCUTAneous Q24H    cetirizine (ZYRTEC) tablet 10 mg  10 mg Oral DAILY    cholecalciferol (VITAMIN D3) (1000 Units /25 mcg) tablet 2 Tab  2,000 Units Oral DAILY    prenatal vit-iron fumarate-fa (PRENATAL PLUS with IRON) tablet 1 Tab  1 Tab Oral DAILY    timolol (TIMOPTIC) 0.5 % ophthalmic solution 1 Drop  1 Drop Right Eye DAILY    latanoprost (XALATAN) 0.005 % ophthalmic solution 1 Drop  1 Drop Both Eyes QPM    melatonin tablet 3 mg  3 mg Oral QHS PRN    aspirin delayed-release tablet 81 mg  81 mg Oral QHS    lactobac ac& pc-s.therm-b.anim (EMILY Q/RISAQUAD)  1 Cap Oral DAILY    influenza vaccine 2019- (6 mos+)(PF) (FLUARIX/FLULAVAL/FLUZONE QUAD) injection 0.5 mL  0.5 mL IntraMUSCular PRIOR TO DISCHARGE     REVIEW OF SYSTEMS:      Review of Systems -   History obtained from chart review and the patient  General ROS: negative for - chills or weight loss  Psychological ROS: negative  Hematological and Lymphatic ROS: negative for - blood transfusions  Respiratory ROS: no cough, shortness of breath, or wheezing  Cardiovascular ROS: no chest pain or dyspnea on exertion  Gastrointestinal ROS: negative for - abdominal pain, constipation or nausea/vomiting  Genito-Urinary ROS: negative for - hematuria  Musculoskeletal ROS: negative  Neurological ROS: no TIA or stroke symptoms  Dermatological ROS: negative for - pruritus    Objective:   VITALS:    Visit Vitals  /64 (BP 1 Location: Right arm, BP Patient Position: At rest)   Pulse 87   Temp 98.2 °F (36.8 °C)   Resp 16   Ht 5' 8\" (1.727 m)   Wt 89.5 kg (197 lb 5 oz)   SpO2 96%   BMI 30.00 kg/m²     Temp (24hrs), Av.5 °F (37.5 °C), Min:98.2 °F (36.8 °C), Max:100.4 °F (38 °C)    PHYSICAL EXAM:   General:    Alert, cooperative, well appearing WM in no distress, appears stated age. Head:   Normocephalic, without obvious abnormality, atraumatic. Eyes:   Conjunctivae clear, anicteric sclerae. Pupils are equal  Nose:  Nares normal. No drainage or sinus tenderness. Throat:    Lips, mucosa, and tongue normal.  No Thrush  Neck:  Supple, symmetrical,  no adenopathy, thyroid: non tender  Back:    Symmetric,  No CVA tenderness. Lungs:   CTA bilaterally. No wheezing/rhonchi/rales. Heart:   Regular rate and rhythm,  no murmur, rub or gallop.   Abdomen:   Soft, non-tender. Not distended. Bowel sounds normal. No masses. No hepatosplenomegaly. No shifting dullness. Rectal:  deferred  Extremities: No cyanosis. No edema. No clubbing  Skin:     Texture, turgor normal. No rashes/lesions/jaundice  Lymph: Cervical, supraclavicular normal.  Psych:  Good insight. Not depressed. Not anxious or agitated. Neurologic: EOMs intact. No facial asymmetry. No aphasia or slurred speech normal strength, A/O X 3. LAB DATA REVIEWED:    Lab Results   Component Value Date/Time    WBC 2.4 (L) 11/19/2019 04:44 AM    HGB 11.3 (L) 11/19/2019 04:44 AM    HCT 34.0 (L) 11/19/2019 04:44 AM    PLATELET 031 65/31/8796 04:44 AM    MCV 85.2 11/19/2019 04:44 AM     Lab Results   Component Value Date/Time    ALT (SGPT) 39 11/19/2019 04:44 AM    AST (SGOT) 42 (H) 11/19/2019 04:44 AM    Alk.  phosphatase 274 (H) 11/19/2019 04:44 AM    Bilirubin, direct 0.3 (H) 11/13/2019 08:56 AM    Bilirubin, total 1.4 (H) 11/19/2019 04:44 AM     Lab Results   Component Value Date/Time    Sodium 136 11/19/2019 04:44 AM    Potassium 3.8 11/19/2019 04:44 AM    Chloride 107 11/19/2019 04:44 AM    CO2 22 11/19/2019 04:44 AM    Anion gap 7 11/19/2019 04:44 AM    Glucose 95 11/19/2019 04:44 AM    BUN 9 11/19/2019 04:44 AM    Creatinine 0.88 11/19/2019 04:44 AM    BUN/Creatinine ratio 10 (L) 11/19/2019 04:44 AM    GFR est AA >60 11/19/2019 04:44 AM    GFR est non-AA >60 11/19/2019 04:44 AM    Calcium 8.0 (L) 11/19/2019 04:44 AM     No results found for: LPSE  Lab Results   Component Value Date/Time    INR 1.0 05/10/2017 09:02 AM    INR 1.0 02/02/2014 04:10 PM    Prothrombin time 10.4 05/10/2017 09:02 AM    Prothrombin time 10.7 02/02/2014 04:10 PM         11/16/2019 03:56  11/17/2019 06:06 11/18/2019 03:35 11/19/2019 04:44   Bilirubin, total  0.7  0.5 1.1 (H) 1.4 (H)   Protein, total  6.9  6.6 6.4 6.4   Albumin  2.9 (L)  2.8 (L) 2.7 (L) 2.7 (L)   Globulin  4.0  3.8 3.7 3.7   A-G Ratio  0.7 (L)  0.7 (L) 0.7 (L) 0.7 (L)   ALT (SGPT)  38  40 40 39   AST  46 (H)  45 (H) 42 (H) 42 (H)   Alk. phosphatase  250 (H)  249 (H) 263 (H) 274 (H)       Impression:  Fever after BCG bladder instillations  Elevation in ALK PHOS and transaminases  Gallbladder sludge on U/S  Increased liver echogenicity  Leukopenia    Plan:  Patient has had a steady elevation in alk phos and this could have been due to his BCG instillations with resultant granulomatous hepatitis. 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 Granulomatous hepatitis arises rarely as either an early or late complication of BCG intravesical instillations. The typical presentation was illustrated by a case report in which a patient presented with fever and jaundice five days after a particularly difficult catheterization, which had been associated with gross hematuria; this individual had undergone 13 previous instillations [3]. Although most cases develop days to months after multiple instillations, M. bovis was recovered from blood cultures within hours of the first instillation in one case [35]. Patients with granulomatous hepatitis present with symptoms and signs of hepatitis, including fever, anorexia, and jaundice. Noncaseating granulomas are found on liver biopsy [7]; eosinophils can also be seen in these specimens [3]. Three cases have been reported in which renal insufficiency accompanied granulomatous hepatitis; epithelioid granulomas were found on renal biopsy in two of these patients [36      The INH risk increases with age to > 72 and with alcohol use but given pt only needing tx for 2 weeks will monitor the transaminases as we're doing    If abnormalities of liver function exceed 3 to 5 times the upper limit of normal (ULN), strongly consider discontinuation of isoniazid.  Liver function tests are not a substitute for a clinical evaluation at monthly intervals or for the prompt assessment of signs or symptoms of adverse reactions occurring between regularly scheduled evaluations.      -check daily LFT  -check serologies to r/o iron panel, ferritin, ceruloplasmin, MARY, ASMA, MARY  -monitor PT/INR  -Liver bx if LFTs exceed 3-5 x normal or other signs of increased toxicity risk         ___________________________________________________  Care Plan discussed with:    [x]    Patient   []    Family   [x]    Nursing   []    Attending   ___________________________________________________  GI: Judy Lucero MD

## 2019-11-20 NOTE — DISCHARGE INSTRUCTIONS
Patient Discharge Instructions     Pt Name  Behzad Vila. Date of Birth 1945   Age  76 y.o. Medical Record Number  [id number]   PCP Danita Rick MD    Admit date:  11/13/2019 @    [place] [address]    Room Number  1123/01   Date of Discharge 11/20/2019     Admission Diagnoses:     Fever          Allergies   Allergen Reactions    Nsaids (Non-Steroidal Anti-Inflammatory Drug) Other (comments)     As per physcian order due to glaucoma and CKD        You were admitted to [address] for  Fever    YOUR [place] (BUT NOT LIMITED TO ):  Present on Admission:   Fever   Dyslipidemia   Glaucoma   Mild protein-calorie malnutrition ([address]   Prostate CA ([facility] [address]   Bladder cancer ([facility] [address]      DIET:  Regular Diet   Oral Nutritional Supplements: Ensure Pudding   Supplement Frequency: Once daily    Recommended activity: Activity as tolerated  Follow up : Follow-up Information     Follow up With Specialties Details Why Barrie Montano MD Family Practice Schedule an appointment as soon as possible for a visit in 1 week for PCP post hospital follow up appt. [address] [place] [address]  [phone]      Wilfrid Conway Physician Assistant On 11/26/2019 Please follow up with EILEEN at 2:45pm   [address]  [phone]      Eren Salter MD Infectious Diseases, Internal Medicine On 11/25/2019 your appt is a t 2pm on Monday  [address] [place] [address] 83. [phone]                  · It is important that you take the medication exactly as they are prescribed. · Keep your medication in the bottles provided by the pharmacist and keep a list of the medication names, dosages, and times to be taken in your wallet. · Do not take other medications without consulting your doctor.        ADDITIONAL INFORMATION: If you experience any of the following symptoms or have any health problem not listed below, then please call your primary care physician or return to the emergency room if you cannot get hold of your doctor: Fever, chills, nausea, vomiting, diarrhea, change in mentation, falling, bleeding, shortness of breath. I understand that if any problems occur once I am discharged, I am supposed to call my Primary care physician for further care or seek help in the Emergency Department at the nearest Healthcare facility. I have had an opportunity to discuss my clinical issues with my doctor and nursing staff. I understand and acknowledge receipt of the above instructions.                                                                                                                                            Physician's or R.N.'s Signature                                                            Date/Time                                                                                                                                              Patient or Representative Signature                                                 Date/Time

## 2019-11-20 NOTE — PROGRESS NOTES
Infectious Disease Progress Note    IMPRESSION:       - Fever after BCG bladder instillation- instillation on 10/16, 10/23( after 3  attempted cathetarizations) , 10/31 ( did not hold medication for long)    - Persistent fevers, night sweats  even while on  Cipro     -? Infection vs hypersensivity reaction to BCG     - CT chest / abd pelvis unremarkable     - Pt now reports cough , white sputum, no abdominal pain, no dysuria    - Further increase in ALP, up to 279, 249 at time of admission, minimal increase AST, ALT normal    - US abdomen -Increased echogenicity liver possibly reflecting underlying liver  parenchymal disease or steatosis.   2.  Multiple bilateral renal cysts.   3.  Gallbladder sludge without evidence of gallbladder wall thickening or  pericholecystic fluid.    - H/o bladder ca , prostate ca   Complications of BCG therapy are inclusive but not limited to the following : cystitis, pneumonitis , prostatitis ,epidydymitis, granulomatous  hepatitis, osteomyelitis, disseminated infection & sepsis. PLAN:          - Continue  mg ,Rifampin 600 mg, Pyridoxine 50 plan ,for 2 weeks as pt is fever free    - Blood & sputum for AFB    - Monitor temp, liver & renal function, pt to have eye exam as out pt. - GI evaluation    - Ultrasound of prostate     - Spoke with office of Dr Judy Haro, awaiting call back     - Plan of care D/w pt & spouse at length, all questions answered. -Do not recommend DC until fever free. Subjective:      Pt seen . Denies new complaints . Feels better overall. Low grade temps + 99    Review of Systems:  A comprehensive review of systems was negative except for that written in the History of Present Illness. 10 point ROS obtained . All other systems negative . Objective:     Blood pressure 147/65, pulse 85, temperature 100.4 °F (38 °C), resp. rate 18, height 5' 8\" (1.727 m), weight 197 lb 5 oz (89.5 kg), SpO2 100 %.   Temp (24hrs), Av.4 °F (37.4 °C), Min:98.5 °F (36.9 °C), Max:100.4 °F (38 °C)      Patient Vitals for the past 24 hrs:   Temp Pulse Resp BP SpO2   11/19/19 2046 100.4 °F (38 °C) 85 18 147/65 100 %   11/19/19 1554 99.5 °F (37.5 °C) 81 16 139/71 94 %   11/19/19 0759 99.3 °F (37.4 °C) 77 16 147/69 96 %   11/19/19 0440 98.5 °F (36.9 °C) 73 18 126/61 100 %         Lines:  Peripheral IV:       Physical Exam:   General:  Awake, cooperative,    Eyes:  Sclera anicteric. Pupils equally round and reactive to light. Mouth/Throat: Mucous membranes normal, oral pharynx clear   Neck: Supple   Lungs:   Scattered rhonchi    CV:     Abdomen:   Soft, non-tender. bowel sounds normal. non-distended   Extremities: No cyanosis or edema   Skin: Skin color, texture, turgor normal. no acute rash or lesions   Lymph nodes: Cervical and supraclavicular normal   Musculoskeletal: No swelling or deformity   Lines/Devices:  Intact, no erythema,    Data Review:     Studies:      Lab Results   Component Value Date/Time    Culture result: NO GROWTH 2 DAYS 11/17/2019 04:51 PM    Culture result: NO GROWTH 3 DAYS 11/16/2019 10:52 AM    Culture result: NO GROWTH 5 DAYS 11/13/2019 10:09 AM    Culture result: NO GROWTH 1 DAY 11/13/2019 08:56 AM    Culture result: NO SIGNIFICANT GROWTH 05/10/2017 09:02 AM        XR Results (most recent):  Results from Hospital Encounter encounter on 11/13/19   XR CHEST PORT    Narrative INDICATION: Fever    COMPARISON: 5/10/2017    FINDINGS: AP portable imaging of the chest performed at 10:13 AM demonstrates a  stable cardiomediastinal silhouette. There is no new airspace disease or pleural  effusion. Minimal left basilar scarring is unchanged. No significant osseous  abnormalities are seen. Impression IMPRESSION: No evidence of acute cardiopulmonary process.          Patient Active Problem List   Diagnosis Code    Glaucoma H40.9    Pure hypercholesterolemia E78.00    Vitamin D deficiency E55.9    Chronic kidney disease, stage 2, mildly decreased GFR N18.2    Tinea pedis B35.3    Toenail fungus B35.1    Prostate cancer ([facility] [address] C61    Malignant neoplasm of urinary bladder (HCC) C67.9    Fever R50.9         ICD-10-CM ICD-9-CM    1. Fever, unspecified fever cause R50.9 780.60    2. Adverse effect of Bacillus Calmette-Guérin (BCG) vaccine, initial encounter T50. A95A E948.0        I have discussed the diagnosis with the patient and the intended plan as seen in the above orders. I have discussed medication side effects and warnings with the patient as well.     Reviewed test results at length with patient    Anti-infectives:      Isoniazid/ Rifampin/ pyridoxine - 11/17    S/p Janine Woodruff MD FACP

## 2019-11-20 NOTE — PROGRESS NOTES
JESSIKA:   1) Home with f.u appts   2) Family will drive pt home at time of discharge     4:19 PM- D/C order noted. CM unable to make PCP follow up appt but made all other appts. All appts on AVS. Pt is cleared to discharge from CM perspective.      Elayne Orozco, MSW, [address]   [phone]

## 2019-11-20 NOTE — PROGRESS NOTES
Oncology End of Shift Note      Bedside shift change report given to Kailyn Noe (incoming nurse) by Cara Ramos (outgoing nurse) on [employer]. . Report included the following information SBAR, Kardex, Intake/Output and MAR. Shift Summary: Pt remained stable throughout shift. Scheduled meds given, no PRN meds given, new IV placed & flushes well. Hourly rounding completed, pt turns self, up ad agustín to bathroom. Fever free today. Issues for Physician to Address:       Patient on Cardiac Monitoring?     [] Yes  [x] No    Rhythm:            Cara Ramos

## 2019-11-20 NOTE — PROGRESS NOTES
Nutrition Assessment:    INTERVENTIONS/RECOMMENDATIONS:   · Continue regular diet  · Ensure pudding daily (chocolate)  · Please document % meals and supplements consumed in flowsheet I/O's under intake   · Noted for last documented BM on 11/12 if this is accurate may need a more aggressive bowel regimen     ASSESSMENT:   Chart reviewed. Pt reports feeling better today and was able to consume 100% of his breakfast. He notes this is the first time he was able to consume a whole meal. His usual intake since admission has been ~50% of meals. Ensure enlive was added last follow-up however pt did not care for it. He agreed to try ensure pudding with lunch meals. Hopefully his PO intake will continue to improve now that he is feeling better. Diet Order: Regular  % Eaten:  No data found. Pertinent Medications: [x]Reviewed: vit D3, colace, lactobac, MVI, B6,   Pertinent Labs: [x]Reviewed:   Food Allergies: [x]NKFA  []Other   Last BM:  11/12  Edema:  n/a    []RUE   []LUE   []RLE   []LLE      Pressure Ulcer:  n/a    [] Stage I   [] Stage II   [] Stage III   [] Stage IV      Anthropometrics: Height: 5' 8\" (172.7 cm) Weight: 89.5 kg (197 lb 5 oz)    IBW (%IBW):   ( ) UBW (%UBW):   (  %)    BMI: Body mass index is 30 kg/m². This BMI is indicative of:  []Underweight   []Normal   []Overweight   [x] Obesity   [] Extreme Obesity (BMI>40)  Last Weight Metrics:  Weight Loss Metrics 11/13/2019 3/6/2019 8/30/2017 6/14/2017 5/22/2017 5/10/2017 8/26/2016   Today's Wt 197 lb 5 oz 206 lb 14.4 oz 197 lb 1.6 oz 199 lb 9.6 oz 200 lb 200 lb 202 lb 11.2 oz   BMI 30 kg/m2 32.41 kg/m2 30.87 kg/m2 30.35 kg/m2 30.41 kg/m2 30.41 kg/m2 31.75 kg/m2       Estimated Nutrition Needs (Based on): 2099 Kcals/day(BMR (1615) x 1. 3AF) , 90 g(-108g (1.0-1.2 g/kg bw)) Protein  Carbohydrate:  At Least 130 g/day  Fluids: 2100 mL/day or per primary team    NUTRITION DIAGNOSES:   Problem:  Unintended weight loss      Etiology: related to decreased appetite caused by fever     Signs/Symptoms: as evidenced by 9 lbs weight loss in 1 week    Previous Nutrition Dx:  [] Resolved  [] Unresolved           [] Progressing    NUTRITION INTERVENTIONS:  Meals/Snacks: General/healthful diet   Supplements: Commercial supplement              GOAL:   consume >75% of meals and ONS in 3-5 days    NUTRITION MONITORING AND EVALUATION      Food/Nutrient Intake Outcomes:  Total energy intake  Physical Signs/Symptoms Outcomes: Weight/weight change, Electrolyte and renal profile    Previous Goal Met:   [] Met              [x] Progressing Towards Goal              [] Not Progressing Towards Goal   Previous Recommendations:   [x] Implemented          [] Not Implemented          [] Not Applicable    LEARNING NEEDS (Diet, Food/Nutrient-Drug Interaction):    [x] None Identified   [] Identified and Education Provided/Documented   [] Identified and Pt declined/was not appropriate     Cultural, Latter day, OR Ethnic Dietary Needs:    [x] None Identified   [] Identified and Addressed     [x] Interdisciplinary Care Plan Reviewed/Documented    [x] Discharge Planning: General healthy diet    [] Participated in Interdisciplinary Rounds    NUTRITION RISK:    [] High              [x] Moderate           []  Low  []  Minimal/Uncompromised      Branden Summers RDN  Pager [phone]  Weekend Pager [phone]

## 2019-11-20 NOTE — DISCHARGE SUMMARY
Hospitalist Discharge Summary Patient ID: 
Leana Gannon 
[id number] 
21 y.o. 
1945 11/13/2019 PCP on record: Max Saravia MD 
 
Admit date: 11/13/2019 Discharge date and time: 11/20/2019 DISCHARGE DIAGNOSIS: 
 
*** CONSULTATIONS: 
IP CONSULT TO UROLOGY 
IP CONSULT TO INFECTIOUS DISEASES 
IP CONSULT TO GASTROENTEROLOGY 
IP CONSULT TO HOSPITALIST Excerpted HPI from H&P of Marilee Chan MD: 
*** 
 
______________________________________________________________________ DISCHARGE SUMMARY/HOSPITAL COURSE:  for full details see H&P, daily progress notes, labs, consult notes. _______________________________________________________________________ Patient seen and examined by me on discharge day. Pertinent Findings: 
Gen:    Not in distress Chest: Clear lungs CVS:   Regular rhythm. No edema Abd:  Soft, not distended, not tender Neuro:  Alert, *** 
_______________________________________________________________________ DISCHARGE MEDICATIONS:  
Current Discharge Medication List  
  
START taking these medications Details  
isoniazid (NYDRAZID) 300 mg tablet Take 1 Tab by mouth daily for 10 days. Qty: 10 Tab, Refills: 0  
  
melatonin 3 mg tablet Take 1 Tab by mouth nightly as needed. rifAMPin (RIFADIN) 300 mg capsule Take 2 Caps by mouth Daily (before breakfast) for 10 days. Qty: 20 Cap, Refills: 0  
  
pyridoxine, vitamin B6, (VITAMIN B-6) 50 mg tablet Take 1 Tab by mouth daily. CONTINUE these medications which have CHANGED Details  
acetaminophen (TYLENOL) 325 mg tablet Take 1 Tab by mouth every six (6) hours as needed for Fever (more than 101). Qty: 30 Tab, Refills: 0 CONTINUE these medications which have NOT CHANGED Details Bifidobacterium Infantis (ALIGN) 4 mg cap Take 1 Tab by mouth daily. aspirin delayed-release 81 mg tablet Take 81 mg by mouth daily. PRENATAL VITS/IRON FUM/FOLIC (M-VIT PO) Take 1 Tab by mouth daily. Multi vit   
  
cholecalciferol, vitamin D3, (VITAMIN D3) 2,000 unit tab Take 1 Tab by mouth daily. timolol maleate (ISTALOL) 0.5 % drpd ophthalmic solution Administer 1 Drop to right eye daily. travoprost (TRAVATAN Z) 0.004 % ophthalmic solution Administer 1 Drop to both eyes every evening. cetirizine (ZYRTEC) 10 mg tablet Take 10 mg by mouth daily. STOP taking these medications  
  
 simvastatin (ZOCOR) 20 mg tablet Comments:  
Reason for Stopping:   
   
  
 
 
 
Patient Follow Up Instructions: Activity: {discharge activity:97241} Diet: {diet:03425} Wound Care: {wound care:10879} Follow-up with *** in {0-10:39176} {units:11}. Follow-up tests/labs *** Follow-up Information Follow up With Specialties Details Why Contact Info Yee Khanna MD Family Practice Schedule an appointment as soon as possible for a visit in 1 week for PCP post hospital follow up appt. [address] [facility] [address] [place] [address] 
[phone] Blessing Malin Physician Assistant On 11/26/2019 Please follow up with G.I. at 2:45pm  [address] [facility] [address] [facility] 
[phone] Jasper Ricardo MD Infectious Diseases, Internal Medicine On 11/25/2019 your appt is a t 2pm on Monday  [address] [facility] 
[phone] 
  
  
 
________________________________________________________________ Risk of deterioration: {BSHSI BLANK LIST:20061::\"Low\",\"Moderate\",\"High\"} Condition at Discharge:  Stable 
__________________________________________________________________ Disposition {BSI BLANK LIST:20061::\"Home with family, no needs\",\"Home with family and home health services\",\"SNF/LTC\",\"IP Rehab\",\"Home with hospice services\",\"IP Hospice\",\"***\"} 
 
____________________________________________________________________ Code Status: {BSHSI BLANK LIST:20061::\"DNR/DNI\",\"Partial\",\"Full Code\"} 
___________________________________________________________________ Total time in minutes spent coordinating this discharge (includes going over instructions, follow-up, prescriptions, and preparing report for sign off to her PCP) :  *** minutes Signed: 
Karla Rogers NP

## 2019-11-21 ENCOUNTER — PATIENT OUTREACH (OUTPATIENT)
Dept: FAMILY MEDICINE CLINIC | Age: 74
End: 2019-11-21

## 2019-11-21 LAB
A1AT SERPL-MCNC: 221 MG/DL (ref 101–187)
ACTIN IGG SERPL-ACNC: 23 UNITS (ref 0–19)
ANA SER QL: NEGATIVE
BACTERIA SPEC CULT: NORMAL
CERULOPLASMIN SERPL-MCNC: 41.3 MG/DL (ref 16–31)
MITOCHONDRIA M2 IGG SER-ACNC: <20 UNITS (ref 0–20)
SERVICE CMNT-IMP: NORMAL

## 2019-11-21 NOTE — PROGRESS NOTES
Hospital Discharge Follow-Up      Date/Time:  2019 2:41 PM    Patient was admitted to [facility] on 19 and discharged on 19 for sepsis. The physician discharge summary was not available at the time of outreach. Patient was contacted within 1 business days of discharge. Top Challenges reviewed with the provider     Advance Care Planning:   Does patient have an Advance Directive:  not on file        Method of communication with provider :none      Was this a readmission? no   Patient stated reason for the readmission: n/a    Care Transition Nurse (CTN) contacted the family by telephone to perform post hospital discharge assessment. Verified name and  with family as identifiers. Provided introduction to self, and explanation of the CTN role. Family received hospital discharge instructions. CTN reviewed discharge instructions and red flags with family who verbalized understanding. Family given an opportunity to ask questions and does not have any further questions or concerns at this time. The family agrees to contact the PCP office for questions related to their healthcare. CTN provided contact information for future reference. Disease Specific:   Sepsis    Patients top risk factors for readmission:  lack of knowledge about disease, medical condition      Medication(s):   New Medications at Discharge: isoniazid 300 mg tablet (NYDRAZID)  Start taking on: 2019  melatonin 3 mg tablet  pyridoxine (vitamin B6) 50 mg tablet (VITAMIN B-6)  Start taking on: 2019  rifAMPin 300 mg capsule (RIFADIN)  Start taking on: 2019    Changed Medications at [address] [place] [address] 325 mg tablet (TYLENOL     Discontinued Medications at Discharge: simvastatin 20 mg tablet (ZOCOR    Medication reconciliation was performed with family, who verbalizes understanding of administration of home medications.   There were no barriers to obtaining medications identified at this time. Referral to Pharm D needed: no     Current Outpatient Medications   Medication Sig    acetaminophen (TYLENOL) 325 mg tablet Take 1 Tab by mouth every six (6) hours as needed for Fever (more than 101).  isoniazid (NYDRAZID) 300 mg tablet Take 1 Tab by mouth daily for 10 days.  melatonin 3 mg tablet Take 1 Tab by mouth nightly as needed.  rifAMPin (RIFADIN) 300 mg capsule Take 2 Caps by mouth Daily (before breakfast) for 10 days.  pyridoxine, vitamin B6, (VITAMIN B-6) 50 mg tablet Take 1 Tab by mouth daily.  Bifidobacterium Infantis (ALIGN) 4 mg cap Take 1 Tab by mouth daily.  aspirin delayed-release 81 mg tablet Take 81 mg by mouth daily.  PRENATAL VITS/IRON FUM/FOLIC (M-VIT PO) Take 1 Tab by mouth daily. Multi vit     cholecalciferol, vitamin D3, (VITAMIN D3) 2,000 unit tab Take 1 Tab by mouth daily.  timolol maleate (ISTALOL) 0.5 % drpd ophthalmic solution Administer 1 Drop to right eye daily.  travoprost (TRAVATAN Z) 0.004 % ophthalmic solution Administer 1 Drop to both eyes every evening.  cetirizine (ZYRTEC) 10 mg tablet Take 10 mg by mouth daily. No current facility-administered medications for this visit. There are no discontinued medications.     MG follow up appointment(s):   Future Appointments   Date Time Provider Khushboo King   11/25/2019  2:00 PM Bubba Stevens MD [address] [facility]

## 2019-11-22 LAB
ALP BONE CFR SERPL: 35 % (ref 12–68)
ALP INTEST CFR SERPL: 0 % (ref 0–18)
ALP LIVER CFR SERPL: 65 % (ref 13–88)
ALP SERPL-CCNC: 331 IU/L (ref 39–117)

## 2019-11-22 NOTE — CDMP QUERY
Patient admitted with fever after BCG bladder instillations. If possible, please document in progress notes and d/c summary, the final diagnosis after further studies: 
 
=>Adverse reaction to BCG bladder instillation 
=> Sepsis due to BCG bladder instillation =Sepsis ruled out 
=>Other Explanation of clinical findings =>Clinically Undetermined (no explanation for clinical findings) The medical record reflects the following: 
 
   Risk Factors: bladderca, prostate ca Clinical Indicators: 11/19 pn-Concern for BCG sepsis, 11/19 ID- Fever after BCG bladder instillation- instillation on 10/16, 10/23( after 3  attempted cathetarizations) , 10/31 ( did not hold medication for long) ? Infection vs hypersensivity reaction to BCG. T 101.7, hr 99, wbc 2.2 Treatment:urine afb, negative, blood afb pending, ceftriaxone stopped d/t rash, Isoniazid Thanks, Marie Beasley RN/CDMP

## 2019-11-23 LAB
BACTERIA SPEC CULT: NORMAL
SERVICE CMNT-IMP: NORMAL

## 2019-11-25 ENCOUNTER — OFFICE VISIT (OUTPATIENT)
Dept: FAMILY MEDICINE CLINIC | Age: 74
End: 2019-11-25

## 2019-11-25 ENCOUNTER — TELEPHONE (OUTPATIENT)
Dept: FAMILY MEDICINE CLINIC | Age: 74
End: 2019-11-25

## 2019-11-25 ENCOUNTER — HOSPITAL ENCOUNTER (OUTPATIENT)
Dept: GENERAL RADIOLOGY | Age: 74
Discharge: HOME | End: 2019-11-25
Payer: COMMERCIAL

## 2019-11-25 VITALS
WEIGHT: 187 LBS | OXYGEN SATURATION: 95 % | HEART RATE: 105 BPM | HEIGHT: 68 IN | RESPIRATION RATE: 19 BRPM | BODY MASS INDEX: 28.34 KG/M2 | TEMPERATURE: 98.1 F | DIASTOLIC BLOOD PRESSURE: 50 MMHG | SYSTOLIC BLOOD PRESSURE: 100 MMHG

## 2019-11-25 DIAGNOSIS — R74.8 ELEVATED ALKALINE PHOSPHATASE LEVEL: ICD-10-CM

## 2019-11-25 DIAGNOSIS — N30.80: ICD-10-CM

## 2019-11-25 DIAGNOSIS — R50.9 FEVER AND CHILLS: ICD-10-CM

## 2019-11-25 DIAGNOSIS — R49.0 HOARSENESS OF VOICE: ICD-10-CM

## 2019-11-25 DIAGNOSIS — R05.8 NON-PRODUCTIVE COUGH: ICD-10-CM

## 2019-11-25 DIAGNOSIS — C61 PROSTATE CANCER (HCC): ICD-10-CM

## 2019-11-25 DIAGNOSIS — R61 NIGHT SWEATS: ICD-10-CM

## 2019-11-25 DIAGNOSIS — R74.01 TRANSAMINITIS: ICD-10-CM

## 2019-11-25 DIAGNOSIS — C67.9 MALIGNANT NEOPLASM OF URINARY BLADDER, UNSPECIFIED SITE (HCC): ICD-10-CM

## 2019-11-25 DIAGNOSIS — R50.9 FEVER AND CHILLS: Primary | ICD-10-CM

## 2019-11-25 DIAGNOSIS — T50.A95A: ICD-10-CM

## 2019-11-25 PROCEDURE — 71046 X-RAY EXAM CHEST 2 VIEWS: CPT

## 2019-11-25 NOTE — TELEPHONE ENCOUNTER
Patient was in the office today seeing Dr. Arjun Landaverde  Patient will be seen back in the office on 12/10 @ 4:30 pm

## 2019-11-25 NOTE — PROGRESS NOTES
Chief Complaint   Patient presents with   [address] [facility] follow up     Patient complaining of losing his voice  Patient has lost 18 lbs  Patient had dizziness in the shower after bending over.   Wife states blood pressure 124/65  HR 72  Patient has a rash on both arms

## 2019-11-25 NOTE — PROGRESS NOTES
Infectious Disease Progress Note    IMPRESSION:     -  Pt here for hoarseness , Dizziness,fatigue  no fever nightsweats    - Fever after BCG bladder instillation- instillations on 10/16, 10/23 ( after 3  attempted cathetarizations) , 10/31 ( did not hold medication for long)    - Persistent fevers, night sweats  even while on  Cipro     -? Infection vs hypersensivity reaction to BCG     - CT chest / abd pelvis unremarkable     - S/p low grade temp last night 100.4 at 2045, s/p infiltration of IV line & extravasation of fluid into UE    - Further increase in ALP, up to 279, 249 at time of admission, minimal increase AST, ALT normal    - US abdomen -Increased echogenicity liver possibly reflecting underlying liver  parenchymal disease or steatosis.   2.  Multiple bilateral renal cysts.   3.  Gallbladder sludge without evidence of gallbladder wall thickening or  pericholecystic fluid.    - H/o bladder ca , prostate ca   Complications of BCG therapy are inclusive but not limited to the following : cystitis, pneumonitis , prostatitis ,epidydymitis, granulomatous  hepatitis, osteomyelitis, disseminated infection & sepsis. US of prostate   Enlarged prostate. Prostate calcifications which are nonspecific. PLAN:          - DC   mg po daily  ,Rifampin 600 mg po daily , Pyridoxine 50 mg po daily ( plan  total  2 weeks end date 12/1)    - - Monitor temp,drink lots of fluids D/w pt & wife again    - GI evaluation- pt to see Dr Quinn Level   ENT evaluation Dr Isabel Cook, d/w Dr Isabel Cook ' office & appointment obtained      -I had spoken with office of Dr Gabbie Latham, D/w Dr Zhang Restrepo , he is aware & is agreeable to 2 weeks of  INH/ Rifampin provided pt is fever free. 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  He does not believe pts current symptoms of hoarseness are related to medication effect , suggested ENT evaluation , check labs , liver enzymes    - Out pt follow up on 12/10       Subjective:      Pt seen . Overall better than in hospital. But now feeling hoarse , also has dizziness . Per wife low grade temps Tmax 99. LUE swelling resolved, cough dry non productive  Review of Systems:  A comprehensive review of systems was negative except for that written in the History of Present Illness. 10 point ROS obtained . All other systems negative . Objective:     Resp. rate 19, height 5' 8\" (1.727 m), weight 187 lb (84.8 kg). Lines:  Peripheral IV:       Physical Exam:   General:  Awake, alert cooperative,    Eyes:  Sclera anicteric. Pupils equally round and reactive to light. Mouth/Throat: Mucous membranes normal, oral pharynx clear, no erythema    Neck: Supple   Lungs:   Scattered rhonchi    CV:  S1S2 , no added sounds   Abdomen:   Soft, non-tender. bowel sounds normal. non-distended   Extremities: No cyanosis or edema   Skin: Skin color, texture, turgor normal. no acute rash or lesions   Lymph nodes: Cervical and supraclavicular normal   Musculoskeletal: No  swelling ,deformity       Data Review:     Studies:      Lab Results   Component Value Date/Time    Culture result: NO GROWTH 6 DAYS 11/17/2019 04:51 PM    Culture result: NO GROWTH 5 DAYS 11/16/2019 10:52 AM    Culture result: NO GROWTH 5 DAYS 11/13/2019 10:09 AM    Culture result: NO GROWTH 1 DAY 11/13/2019 08:56 AM    Culture result: NO SIGNIFICANT GROWTH 05/10/2017 09:02 AM        XR Results (most recent):  Results from Hospital Encounter encounter on 11/13/19   XR CHEST PORT    Narrative INDICATION: Fever    COMPARISON: 5/10/2017    FINDINGS: AP portable imaging of the chest performed at 10:13 AM demonstrates a  stable cardiomediastinal silhouette. There is no new airspace disease or pleural  effusion. Minimal left basilar scarring is unchanged. No significant osseous  abnormalities are seen.       Impression IMPRESSION: No evidence of acute cardiopulmonary process. Patient Active Problem List   Diagnosis Code    Glaucoma H40.9    Pure hypercholesterolemia E78.00    Vitamin D deficiency E55.9    Chronic kidney disease, stage 2, mildly decreased GFR N18.2    Tinea pedis B35.3    Toenail fungus B35.1    Prostate CA ([facility] [address] C61    Bladder cancer (HCC) C67.9    Fever R50.9    Dyslipidemia E78.5    Elevated alkaline phosphatase level R74.8    Elevated AST (SGOT) R74.0    Mild protein-calorie malnutrition (HCC) E44.1         ICD-10-CM ICD-9-CM    1. Fever and chills R50.9 780.60    2. Night sweats R61 780.8    3. Cystitis due to intravesical BCG administration N30.80 595.89     T50. A95A     4. Transaminitis R74.0 790.4        I have discussed the diagnosis with the patient and the intended plan as seen in the above orders. I have discussed medication side effects and warnings with the patient as well.     Reviewed test results at length with patient    Anti-infectives:      Isoniazid/ Rifampin/ pyridoxine - 11/17- dc     S/p Janine Alberto MD FACP

## 2019-11-25 NOTE — TELEPHONE ENCOUNTER
Pt wife reports he is hoarse and dizzy and wants to come in early     Pt states in background he does not want to come in early     PSR return to tell wife doctor is rounding in hospl and she had hung up

## 2019-11-26 ENCOUNTER — PATIENT OUTREACH (OUTPATIENT)
Dept: FAMILY MEDICINE CLINIC | Age: 74
End: 2019-11-26

## 2019-11-26 LAB
ALBUMIN SERPL-MCNC: 3.7 G/DL (ref 3.5–4.8)
ALBUMIN/GLOB SERPL: 1.1 {RATIO} (ref 1.2–2.2)
ALP SERPL-CCNC: 316 IU/L (ref 39–117)
ALT SERPL-CCNC: 377 IU/L (ref 0–44)
AST SERPL-CCNC: 662 IU/L (ref 0–40)
BASOPHILS # BLD AUTO: 0 X10E3/UL (ref 0–0.2)
BASOPHILS NFR BLD AUTO: 1 %
BILIRUB SERPL-MCNC: 2 MG/DL (ref 0–1.2)
BUN SERPL-MCNC: 17 MG/DL (ref 8–27)
BUN/CREAT SERPL: 15 (ref 10–24)
CALCIUM SERPL-MCNC: 9.4 MG/DL (ref 8.6–10.2)
CHLORIDE SERPL-SCNC: 98 MMOL/L (ref 96–106)
CO2 SERPL-SCNC: 21 MMOL/L (ref 20–29)
CREAT SERPL-MCNC: 1.14 MG/DL (ref 0.76–1.27)
EOSINOPHIL # BLD AUTO: 0 X10E3/UL (ref 0–0.4)
EOSINOPHIL NFR BLD AUTO: 1 %
ERYTHROCYTE [DISTWIDTH] IN BLOOD BY AUTOMATED COUNT: 13.4 % (ref 12.3–15.4)
GLOBULIN SER CALC-MCNC: 3.4 G/DL (ref 1.5–4.5)
GLUCOSE SERPL-MCNC: 107 MG/DL (ref 65–99)
HCT VFR BLD AUTO: 39.2 % (ref 37.5–51)
HGB BLD-MCNC: 13.3 G/DL (ref 13–17.7)
IMM GRANULOCYTES # BLD AUTO: 0 X10E3/UL (ref 0–0.1)
IMM GRANULOCYTES NFR BLD AUTO: 1 %
LYMPHOCYTES # BLD AUTO: 0.7 X10E3/UL (ref 0.7–3.1)
LYMPHOCYTES NFR BLD AUTO: 19 %
MCH RBC QN AUTO: 28.5 PG (ref 26.6–33)
MCHC RBC AUTO-ENTMCNC: 33.9 G/DL (ref 31.5–35.7)
MCV RBC AUTO: 84 FL (ref 79–97)
MONOCYTES # BLD AUTO: 0.4 X10E3/UL (ref 0.1–0.9)
MONOCYTES NFR BLD AUTO: 11 %
NEUTROPHILS # BLD AUTO: 2.3 X10E3/UL (ref 1.4–7)
NEUTROPHILS NFR BLD AUTO: 67 %
PLATELET # BLD AUTO: 243 X10E3/UL (ref 150–450)
POTASSIUM SERPL-SCNC: 4.2 MMOL/L (ref 3.5–5.2)
PROT SERPL-MCNC: 7.1 G/DL (ref 6–8.5)
RBC # BLD AUTO: 4.67 X10E6/UL (ref 4.14–5.8)
SODIUM SERPL-SCNC: 135 MMOL/L (ref 134–144)
WBC # BLD AUTO: 3.4 X10E3/UL (ref 3.4–10.8)

## 2019-11-26 NOTE — PATIENT INSTRUCTIONS
[employer] Activation Thank you for requesting access to [employer]. Please follow the instructions below to securely access and download your online medical record. [employer] allows you to send messages to your doctor, view your test results, renew your prescriptions, schedule appointments, and more. How Do I Sign Up? 1. In your internet browser, go to [email or web address]. 2. Click on the First Time User? Click Here link in the Sign In box. You will see the New Member Sign Up page. 3. Enter your [employer] Access Code exactly as it appears below. You will not need to use this code after youve completed the sign-up process. If you do not sign up before the expiration date, you must request a new code. [employer] Access Code: [email or web address] Expires: 2019  8:34 AM (This is the date your [employer] access code will ) 4. Enter the last four digits of your Social Security Number (xxxx) and Date of Birth (mm/dd/yyyy) as indicated and click Submit. You will be taken to the next sign-up page. 5. Create a [employer] ID. This will be your [employer] login ID and cannot be changed, so think of one that is secure and easy to remember. 6. Create a [employer] password. You can change your password at any time. 7. Enter your Password Reset Question and Answer. This can be used at a later time if you forget your password. 8. Enter your e-mail address. You will receive e-mail notification when new information is available in [address] 9. Click Sign Up. You can now view and download portions of your medical record. 10. Click the Download Summary menu link to download a portable copy of your medical information. Additional Information If you have questions, please visit the Frequently Asked Questions section of the [employer] website at [email or web address] Remember, [employer] is NOT to be used for urgent needs. For medical emergencies, dial 911.

## 2019-11-29 ENCOUNTER — DOCUMENTATION ONLY (OUTPATIENT)
Dept: FAMILY MEDICINE CLINIC | Age: 74
End: 2019-11-29

## 2019-11-29 NOTE — PROGRESS NOTES
Discussed with pt his symptoms . Feels \"  great \" was his response. Pt advised about elevated  liver enzyme results . He is aware . CXR normal   He had seen Dr Tsering Caraballo . Also seen Dr Nydia Lipscomb . Pt to have repeat LFTs next week   Pt advised to drink lots of water , refrain from alcohol, tylenol. Pt voiced understanding.

## 2019-12-03 ENCOUNTER — OFFICE VISIT (OUTPATIENT)
Dept: FAMILY MEDICINE CLINIC | Age: 74
End: 2019-12-03

## 2019-12-03 VITALS
RESPIRATION RATE: 16 BRPM | WEIGHT: 189.8 LBS | OXYGEN SATURATION: 95 % | DIASTOLIC BLOOD PRESSURE: 62 MMHG | BODY MASS INDEX: 28.76 KG/M2 | HEIGHT: 68 IN | HEART RATE: 76 BPM | SYSTOLIC BLOOD PRESSURE: 111 MMHG | TEMPERATURE: 98.6 F

## 2019-12-03 DIAGNOSIS — C67.9 MALIGNANT NEOPLASM OF URINARY BLADDER, UNSPECIFIED SITE (HCC): ICD-10-CM

## 2019-12-03 DIAGNOSIS — N30.90 CYSTITIS: ICD-10-CM

## 2019-12-03 DIAGNOSIS — R50.9 FEVER, UNSPECIFIED FEVER CAUSE: Primary | ICD-10-CM

## 2019-12-03 RX ORDER — BISMUTH SUBSALICYLATE 262 MG
1 TABLET,CHEWABLE ORAL
COMMUNITY
Start: 2019-02-27

## 2019-12-03 RX ORDER — SIMVASTATIN 20 MG/1
TABLET, FILM COATED ORAL
COMMUNITY
End: 2020-04-06 | Stop reason: SDUPTHER

## 2019-12-03 RX ORDER — CHOLECALCIFEROL (VITAMIN D3) 125 MCG
CAPSULE ORAL
COMMUNITY
Start: 2019-02-27

## 2019-12-03 RX ORDER — ECONAZOLE NITRATE 10 MG/G
CREAM TOPICAL
COMMUNITY
Start: 2019-12-02

## 2019-12-03 NOTE — PROGRESS NOTES
Hosp 11/13 for rxn to BCG tx with fever, cystitis, poss sepsis. On antibiotics. Discharged almost 2 weeks ago. No more BCG. To get topical chemo to begin in April. Sees ID in a week. LFTs from antibiotic. Patient denies chest pain, dyspnea, unexpected weight change, unexpected pain, mood or memory changes. Visit Vitals  /62 (BP 1 Location: Left arm, BP Patient Position: Sitting)   Pulse 76   Temp 98.6 °F (37 °C) (Oral)   Resp 16   Ht 5' 8\" (1.727 m)   Wt 189 lb 12.8 oz (86.1 kg)   SpO2 95%   BMI 28.86 kg/m²     Patient alert and cooperative. Here for hospital follow up from recent admission for fever, cystitis, possible sepsis following BCG treatment. Been on antibiotics, has follow up with infectious disease next week. Plan:  1. Due back here in April for physical, labs. 2. Follow otherwise here prn.

## 2019-12-03 NOTE — PROGRESS NOTES
Identified pt with two pt identifiers(name and ). Reviewed record in preparation for visit and have obtained necessary documentation. Chief Complaint   Patient presents with   [facility] Follow Up      Visit Vitals  /62 (BP 1 Location: Left arm, BP Patient Position: Sitting)   Pulse 76   Temp 98.6 °F (37 °C) (Oral)   Resp 16   Ht 5' 8\" (1.727 m)   Wt 189 lb 12.8 oz (86.1 kg)   SpO2 95%   BMI 28.86 kg/m²       Pain Scale: 0 - No pain/10  Pain Location:     Health Maintenance Due   Topic    Shingrix Vaccine Age 50> (1 of 2)    GLAUCOMA SCREENING Q2Y     Influenza Age 5 to Adult        Coordination of Care Questionnaire:  :   1) Have you been to an emergency room, urgent care, or hospitalized since your last visit? If yes, where when, and reason for visit? Yes, 2019, ED [facility], Chemo infection      2. Have seen or consulted any other health care provider since your last visit? If yes, where when, and reason for visit? NO      3) Do you have an Advanced Directive/ Living Will in place? NO  If yes, do we have a copy on file NO  If no, would you like information NO    Patient is accompanied by self I have received verbal consent from Suman Costello. to discuss any/all medical information while they are present in the room.

## 2019-12-06 ENCOUNTER — HOSPITAL ENCOUNTER (OUTPATIENT)
Dept: CT IMAGING | Age: 74
Discharge: HOME | End: 2019-12-06
Attending: OTOLARYNGOLOGY
Payer: COMMERCIAL

## 2019-12-06 ENCOUNTER — TELEPHONE (OUTPATIENT)
Dept: FAMILY MEDICINE CLINIC | Age: 74
End: 2019-12-06

## 2019-12-06 DIAGNOSIS — R49.0 HOARSENESS: ICD-10-CM

## 2019-12-06 PROCEDURE — 74011636320 HC RX REV CODE- 636/320: Performed by: OTOLARYNGOLOGY

## 2019-12-06 PROCEDURE — 70491 CT SOFT TISSUE NECK W/DYE: CPT

## 2019-12-06 RX ORDER — SODIUM CHLORIDE 0.9 % (FLUSH) 0.9 %
10 SYRINGE (ML) INJECTION
Status: COMPLETED | OUTPATIENT
Start: 2019-12-06 | End: 2019-12-06

## 2019-12-06 RX ADMIN — IOPAMIDOL 100 ML: 755 INJECTION, SOLUTION INTRAVENOUS at 07:59

## 2019-12-06 RX ADMIN — Medication 10 ML: at 07:59

## 2019-12-06 NOTE — TELEPHONE ENCOUNTER
Per Dr. Kandy Almodovar:  Please ask him how he is doing  She spoke with Dr. Roger Ceron, doc said the liver enzymes are coming down  If no new symptoms does not need f/o on Tuesday     This nurse spoke to the patient. Patient stated he is feeling great. Back to work  No new symptoms     This gave him the above information from Dr. Kandy Almodovar and the patient's appt.  Was cancelled

## 2019-12-11 ENCOUNTER — DOCUMENTATION ONLY (OUTPATIENT)
Dept: FAMILY MEDICINE CLINIC | Age: 74
End: 2019-12-11

## 2019-12-19 ENCOUNTER — TELEPHONE (OUTPATIENT)
Dept: FAMILY MEDICINE CLINIC | Age: 74
End: 2019-12-19

## 2019-12-19 NOTE — TELEPHONE ENCOUNTER
Pt wife reports they did receive lab order and will have done tomorrow but she would still like to speak to the nurse again           Best number to reach her is [phone]

## 2019-12-20 NOTE — TELEPHONE ENCOUNTER
This nurse spoke to the wife  Per Dr. Randene Najjar, numbers are much better than before  We can repeat LFT's   The orders have been received by the wife & she states she will have them done on Monday.     Oralia Mclaughlin LPN

## 2019-12-30 LAB
ACID FAST STN SPEC: NEGATIVE
MYCOBACTERIUM SPEC QL CULT: NEGATIVE
SPECIMEN PREPARATION: NORMAL
SPECIMEN SOURCE: NORMAL

## 2020-01-13 LAB
ACID FAST STN SPEC: NORMAL
MYCOBACTERIUM SPEC QL CULT: NEGATIVE
SPECIMEN PREPARATION: NORMAL
SPECIMEN SOURCE: NORMAL

## 2020-02-17 ENCOUNTER — HOSPITAL ENCOUNTER (OUTPATIENT)
Dept: MRI IMAGING | Age: 75
Discharge: HOME | End: 2020-02-17
Attending: PHYSICIAN ASSISTANT
Payer: COMMERCIAL

## 2020-02-17 DIAGNOSIS — R74.8 ALKALINE PHOSPHATASE RAISED: ICD-10-CM

## 2020-02-17 PROCEDURE — 74011250636 HC RX REV CODE- 250/636: Performed by: PHYSICIAN ASSISTANT

## 2020-02-17 PROCEDURE — 74183 MRI ABD W/O CNTR FLWD CNTR: CPT

## 2020-02-17 PROCEDURE — A9585 GADOBUTROL INJECTION: HCPCS | Performed by: PHYSICIAN ASSISTANT

## 2020-02-17 RX ADMIN — GADOBUTROL 9 ML: 604.72 INJECTION INTRAVENOUS at 11:26

## 2020-04-06 RX ORDER — SIMVASTATIN 20 MG/1
20 TABLET, FILM COATED ORAL
Qty: 90 TAB | Refills: 0 | Status: SHIPPED | OUTPATIENT
Start: 2020-04-06 | End: 2020-07-07

## 2020-06-17 ENCOUNTER — TELEPHONE (OUTPATIENT)
Dept: FAMILY MEDICINE CLINIC | Age: 75
End: 2020-06-17

## 2020-06-17 DIAGNOSIS — E55.9 VITAMIN D DEFICIENCY: ICD-10-CM

## 2020-06-17 DIAGNOSIS — C67.9 MALIGNANT NEOPLASM OF URINARY BLADDER, UNSPECIFIED SITE (HCC): ICD-10-CM

## 2020-06-17 DIAGNOSIS — C61 PROSTATE CA (HCC): ICD-10-CM

## 2020-06-17 DIAGNOSIS — E78.00 PURE HYPERCHOLESTEROLEMIA: ICD-10-CM

## 2020-06-17 DIAGNOSIS — N18.2 CHRONIC KIDNEY DISEASE, STAGE 2, MILDLY DECREASED GFR: ICD-10-CM

## 2020-06-17 DIAGNOSIS — E78.5 DYSLIPIDEMIA: ICD-10-CM

## 2020-06-17 DIAGNOSIS — C67.9 MALIGNANT NEOPLASM OF URINARY BLADDER, UNSPECIFIED SITE (HCC): Primary | ICD-10-CM

## 2020-06-17 NOTE — TELEPHONE ENCOUNTER
Patient called and would like to have his labs done before his appt on June 25 with Dr Rordiguez Doing also pt would like to have his liver and psa recheck

## 2020-06-17 NOTE — TELEPHONE ENCOUNTER
Patient called and would like to have his labs done before his appt on June 25 with Dr Bayron John also pt would like to have his liver and psa recheck.  Please fax to [facility]. Patient would like to go tomm

## 2020-06-22 ENCOUNTER — TELEPHONE (OUTPATIENT)
Dept: FAMILY MEDICINE CLINIC | Age: 75
End: 2020-06-22

## 2020-06-22 NOTE — TELEPHONE ENCOUNTER
The patient said that he was suppose to complete his lab work before his appointment and will like to complete it as soon as possible.  Please call [place] (Wife) [phone]

## 2020-07-10 LAB
25(OH)D3+25(OH)D2 SERPL-MCNC: 38.1 NG/ML (ref 30–100)
ALBUMIN SERPL-MCNC: 4.5 G/DL (ref 3.7–4.7)
ALBUMIN/GLOB SERPL: 1.6 {RATIO} (ref 1.2–2.2)
ALP SERPL-CCNC: 102 IU/L (ref 39–117)
ALT SERPL-CCNC: 14 IU/L (ref 0–44)
APPEARANCE UR: CLEAR
AST SERPL-CCNC: 20 IU/L (ref 0–40)
BASOPHILS # BLD AUTO: 0 X10E3/UL (ref 0–0.2)
BASOPHILS NFR BLD AUTO: 1 %
BILIRUB SERPL-MCNC: 0.4 MG/DL (ref 0–1.2)
BILIRUB UR QL STRIP: NEGATIVE
BUN SERPL-MCNC: 13 MG/DL (ref 8–27)
BUN/CREAT SERPL: 10 (ref 10–24)
CALCIUM SERPL-MCNC: 9.3 MG/DL (ref 8.6–10.2)
CHLORIDE SERPL-SCNC: 105 MMOL/L (ref 96–106)
CHOLEST SERPL-MCNC: 162 MG/DL (ref 100–199)
CO2 SERPL-SCNC: 25 MMOL/L (ref 20–29)
COLOR UR: YELLOW
CREAT SERPL-MCNC: 1.27 MG/DL (ref 0.76–1.27)
EOSINOPHIL # BLD AUTO: 0.1 X10E3/UL (ref 0–0.4)
EOSINOPHIL NFR BLD AUTO: 3 %
ERYTHROCYTE [DISTWIDTH] IN BLOOD BY AUTOMATED COUNT: 14.1 % (ref 11.6–15.4)
GLOBULIN SER CALC-MCNC: 2.9 G/DL (ref 1.5–4.5)
GLUCOSE SERPL-MCNC: 96 MG/DL (ref 65–99)
GLUCOSE UR QL: NEGATIVE
HCT VFR BLD AUTO: 43.8 % (ref 37.5–51)
HDLC SERPL-MCNC: 40 MG/DL
HGB BLD-MCNC: 14.3 G/DL (ref 13–17.7)
HGB UR QL STRIP: NEGATIVE
IMM GRANULOCYTES # BLD AUTO: 0 X10E3/UL (ref 0–0.1)
IMM GRANULOCYTES NFR BLD AUTO: 0 %
INTERPRETATION, 910389: NORMAL
INTERPRETATION: NORMAL
KETONES UR QL STRIP: NEGATIVE
LDLC SERPL CALC-MCNC: 78 MG/DL (ref 0–99)
LEUKOCYTE ESTERASE UR QL STRIP: NEGATIVE
LYMPHOCYTES # BLD AUTO: 1.3 X10E3/UL (ref 0.7–3.1)
LYMPHOCYTES NFR BLD AUTO: 33 %
MCH RBC QN AUTO: 27.7 PG (ref 26.6–33)
MCHC RBC AUTO-ENTMCNC: 32.6 G/DL (ref 31.5–35.7)
MCV RBC AUTO: 85 FL (ref 79–97)
MICRO URNS: NORMAL
MONOCYTES # BLD AUTO: 0.4 X10E3/UL (ref 0.1–0.9)
MONOCYTES NFR BLD AUTO: 10 %
NEUTROPHILS # BLD AUTO: 2 X10E3/UL (ref 1.4–7)
NEUTROPHILS NFR BLD AUTO: 53 %
NITRITE UR QL STRIP: NEGATIVE
PDF IMAGE, 910387: NORMAL
PH UR STRIP: 5 [PH] (ref 5–7.5)
PLATELET # BLD AUTO: 202 X10E3/UL (ref 150–450)
POTASSIUM SERPL-SCNC: 4.3 MMOL/L (ref 3.5–5.2)
PROT SERPL-MCNC: 7.4 G/DL (ref 6–8.5)
PROT UR QL STRIP: NEGATIVE
PSA SERPL-MCNC: 9.1 NG/ML (ref 0–4)
RBC # BLD AUTO: 5.17 X10E6/UL (ref 4.14–5.8)
SODIUM SERPL-SCNC: 142 MMOL/L (ref 134–144)
SP GR UR: 1.02 (ref 1–1.03)
TRIGL SERPL-MCNC: 220 MG/DL (ref 0–149)
UROBILINOGEN UR STRIP-MCNC: 0.2 MG/DL (ref 0.2–1)
VLDLC SERPL CALC-MCNC: 44 MG/DL (ref 5–40)
WBC # BLD AUTO: 3.8 X10E3/UL (ref 3.4–10.8)

## 2022-02-02 ENCOUNTER — NURSE TRIAGE (OUTPATIENT)
Dept: OTHER | Facility: CLINIC | Age: 77
End: 2022-02-02

## 2022-02-02 NOTE — TELEPHONE ENCOUNTER
Received call from [place] at [facility] with Red Flag Complaint. Subjective: Caller states \"dizzy yesterday\"     Current Symptoms: denies dizziness today. He is due for a physical and doesn't have a doctor, wanting new patient appt. He got dizzy once yesterday with some tunnel vision for about 30 minutes. He feels it is from anxiety. Onset: once every 2 years  gradual, intermittent    Pain Severity: denies; n/a; n/a      Temperature: denies by parent's tactile estimate    What has been tried: nothing      Recommended disposition: see within 3 days    Care advice provided, patient verbalizes understanding; denies any other questions or concerns; instructed to call back for any new or worsening symptoms. Writer provided warm transfer to [place] at [facility] for appointment scheduling    Attention Provider: Thank you for allowing me to participate in the care of your patient. The patient was connected to triage in response to information provided to the [facility]. Please do not respond through this encounter as the response is not directed to a shared pool.       Reason for Disposition   MILD dizziness (e.g., walking normally) AND has NOT been evaluated by physician for this (Exception: dizziness caused by heat exposure, sudden standing, or poor fluid intake)    Protocols used: DIZZINESS-ADULT-[place]

## 2022-03-18 PROBLEM — R50.9 FEVER: Status: ACTIVE | Noted: 2019-11-13

## 2022-03-19 PROBLEM — R74.01 ELEVATED AST (SGOT): Status: ACTIVE | Noted: 2019-11-20

## 2022-03-19 PROBLEM — E44.1 MILD PROTEIN-CALORIE MALNUTRITION (HCC): Status: ACTIVE | Noted: 2019-11-20

## 2022-03-19 PROBLEM — R74.8 ELEVATED ALKALINE PHOSPHATASE LEVEL: Status: ACTIVE | Noted: 2019-11-20

## 2022-03-19 PROBLEM — N30.90 CYSTITIS: Status: ACTIVE | Noted: 2019-12-03

## 2022-03-20 PROBLEM — E78.5 DYSLIPIDEMIA: Status: ACTIVE | Noted: 2019-11-20

## 2023-08-06 ENCOUNTER — HOSPITAL ENCOUNTER (EMERGENCY)
Facility: HOSPITAL | Age: 78
Discharge: HOME | End: 2023-08-06
Attending: STUDENT IN AN ORGANIZED HEALTH CARE EDUCATION/TRAINING PROGRAM
Payer: MEDICARE

## 2023-08-06 VITALS
BODY MASS INDEX: 29.41 KG/M2 | SYSTOLIC BLOOD PRESSURE: 129 MMHG | HEART RATE: 78 BPM | HEIGHT: 67 IN | TEMPERATURE: 98.6 F | WEIGHT: 187.39 LBS | DIASTOLIC BLOOD PRESSURE: 58 MMHG | RESPIRATION RATE: 11 BRPM | OXYGEN SATURATION: 99 %

## 2023-08-06 DIAGNOSIS — R42 DIZZINESS: Primary | ICD-10-CM

## 2023-08-06 DIAGNOSIS — R31.9 HEMATURIA, UNSPECIFIED TYPE: ICD-10-CM

## 2023-08-06 DIAGNOSIS — R55 VASO-VAGAL REACTION: ICD-10-CM

## 2023-08-06 DIAGNOSIS — I95.1 ORTHOSTATIC HYPOTENSION: ICD-10-CM

## 2023-08-06 LAB
ALBUMIN SERPL-MCNC: 2.9 G/DL (ref 3.5–5)
ALBUMIN/GLOB SERPL: 0.8 (ref 1.1–2.2)
ALP SERPL-CCNC: 103 U/L (ref 45–117)
ALT SERPL-CCNC: 26 U/L (ref 12–78)
ANION GAP SERPL CALC-SCNC: 3 MMOL/L (ref 5–15)
APPEARANCE UR: ABNORMAL
AST SERPL-CCNC: 28 U/L (ref 15–37)
BACTERIA URNS QL MICRO: NEGATIVE /HPF
BASOPHILS # BLD: 0 K/UL (ref 0–0.1)
BASOPHILS NFR BLD: 1 % (ref 0–1)
BILIRUB SERPL-MCNC: 0.7 MG/DL (ref 0.2–1)
BILIRUB UR QL: NEGATIVE
BUN SERPL-MCNC: 13 MG/DL (ref 6–20)
BUN/CREAT SERPL: 11 (ref 12–20)
CALCIUM SERPL-MCNC: 8.4 MG/DL (ref 8.5–10.1)
CHLORIDE SERPL-SCNC: 110 MMOL/L (ref 97–108)
CO2 SERPL-SCNC: 26 MMOL/L (ref 21–32)
COLOR UR: ABNORMAL
CREAT SERPL-MCNC: 1.22 MG/DL (ref 0.7–1.3)
DIFFERENTIAL METHOD BLD: ABNORMAL
EOSINOPHIL # BLD: 0.2 K/UL (ref 0–0.4)
EOSINOPHIL NFR BLD: 5 % (ref 0–7)
EPITH CASTS URNS QL MICRO: ABNORMAL /LPF
ERYTHROCYTE [DISTWIDTH] IN BLOOD BY AUTOMATED COUNT: 13.2 % (ref 11.5–14.5)
GLOBULIN SER CALC-MCNC: 3.8 G/DL (ref 2–4)
GLUCOSE SERPL-MCNC: 132 MG/DL (ref 65–100)
GLUCOSE UR STRIP.AUTO-MCNC: NEGATIVE MG/DL
HCT VFR BLD AUTO: 31.1 % (ref 36.6–50.3)
HGB BLD-MCNC: 10.5 G/DL (ref 12.1–17)
HGB UR QL STRIP: ABNORMAL
IMM GRANULOCYTES # BLD AUTO: 0 K/UL (ref 0–0.04)
IMM GRANULOCYTES NFR BLD AUTO: 0 % (ref 0–0.5)
KETONES UR QL STRIP.AUTO: 40 MG/DL
LEUKOCYTE ESTERASE UR QL STRIP.AUTO: ABNORMAL
LYMPHOCYTES # BLD: 0.7 K/UL (ref 0.8–3.5)
LYMPHOCYTES NFR BLD: 17 % (ref 12–49)
MAGNESIUM SERPL-MCNC: 2.1 MG/DL (ref 1.6–2.4)
MCH RBC QN AUTO: 29.2 PG (ref 26–34)
MCHC RBC AUTO-ENTMCNC: 33.8 G/DL (ref 30–36.5)
MCV RBC AUTO: 86.6 FL (ref 80–99)
MONOCYTES # BLD: 0.2 K/UL (ref 0–1)
MONOCYTES NFR BLD: 6 % (ref 5–13)
NEUTS SEG # BLD: 2.9 K/UL (ref 1.8–8)
NEUTS SEG NFR BLD: 71 % (ref 32–75)
NITRITE UR QL STRIP.AUTO: NEGATIVE
NRBC # BLD: 0 K/UL (ref 0–0.01)
NRBC BLD-RTO: 0 PER 100 WBC
PH UR STRIP: 8 (ref 5–8)
PLATELET # BLD AUTO: 177 K/UL (ref 150–400)
PMV BLD AUTO: 9.1 FL (ref 8.9–12.9)
POTASSIUM SERPL-SCNC: 3.6 MMOL/L (ref 3.5–5.1)
PROT SERPL-MCNC: 6.7 G/DL (ref 6.4–8.2)
PROT UR STRIP-MCNC: 100 MG/DL
RBC # BLD AUTO: 3.59 M/UL (ref 4.1–5.7)
RBC #/AREA URNS HPF: >100 /HPF (ref 0–5)
RBC MORPH BLD: ABNORMAL
SODIUM SERPL-SCNC: 139 MMOL/L (ref 136–145)
SP GR UR REFRACTOMETRY: 1.01 (ref 1–1.03)
URINE CULTURE IF INDICATED: ABNORMAL
UROBILINOGEN UR QL STRIP.AUTO: 0.2 EU/DL (ref 0.2–1)
WBC # BLD AUTO: 4 K/UL (ref 4.1–11.1)
WBC URNS QL MICRO: ABNORMAL /HPF (ref 0–4)

## 2023-08-06 PROCEDURE — 83735 ASSAY OF MAGNESIUM: CPT

## 2023-08-06 PROCEDURE — 80053 COMPREHEN METABOLIC PANEL: CPT

## 2023-08-06 PROCEDURE — 85025 COMPLETE CBC W/AUTO DIFF WBC: CPT

## 2023-08-06 PROCEDURE — 36415 COLL VENOUS BLD VENIPUNCTURE: CPT

## 2023-08-06 PROCEDURE — 96361 HYDRATE IV INFUSION ADD-ON: CPT

## 2023-08-06 PROCEDURE — 81001 URINALYSIS AUTO W/SCOPE: CPT

## 2023-08-06 PROCEDURE — 96360 HYDRATION IV INFUSION INIT: CPT

## 2023-08-06 PROCEDURE — 51701 INSERT BLADDER CATHETER: CPT

## 2023-08-06 PROCEDURE — 2580000003 HC RX 258: Performed by: STUDENT IN AN ORGANIZED HEALTH CARE EDUCATION/TRAINING PROGRAM

## 2023-08-06 PROCEDURE — 99284 EMERGENCY DEPT VISIT MOD MDM: CPT

## 2023-08-06 RX ORDER — 0.9 % SODIUM CHLORIDE 0.9 %
500 INTRAVENOUS SOLUTION INTRAVENOUS ONCE
Status: COMPLETED | OUTPATIENT
Start: 2023-08-06 | End: 2023-08-06

## 2023-08-06 RX ORDER — ACETAMINOPHEN 500 MG
1000 TABLET ORAL
Status: DISCONTINUED | OUTPATIENT
Start: 2023-08-06 | End: 2023-08-06 | Stop reason: HOSPADM

## 2023-08-06 RX ADMIN — SODIUM CHLORIDE 500 ML: 9 INJECTION, SOLUTION INTRAVENOUS at 11:35

## 2023-08-06 ASSESSMENT — PAIN - FUNCTIONAL ASSESSMENT: PAIN_FUNCTIONAL_ASSESSMENT: 0-10

## 2023-08-06 ASSESSMENT — PAIN SCALES - GENERAL
PAINLEVEL_OUTOF10: 0
PAINLEVEL_OUTOF10: 5

## 2023-08-06 NOTE — ED PROVIDER NOTES
[facility] EMERGENCY DE[facility]  EMERGENCY DEPARTMENT ENCOUNTER       Pt Name: Shannan Valadez MRN: [id number]  Birthdate 1945  Date of evaluation: 8/6/2023  Provider: Alice Tanner MD   PCP: Samir White MD  Note Started: 1:24 PM EDT 8/6/23     CHIEF COMPLAINT       Chief Complaint   Patient presents with    Dizziness     BIB EMS from home. Pt. Was in the shower when he suddenly felt dizzy, weak. Wife helped to lower him to the ground. No injury, no LOC. Prostate removed last Tuesday. Patient arrives with ruiz catheter in place, draining bloody urine. Per pt. Ruiz was draining yellow urine up until episode of dizziness.  per EMS        HISTORY OF PRESENT ILLNESS: 1 or more elements      History From: patient, History limited by: none     Shannan Valadez is a 68 y.o. male presenting with dizziness and hematuria. He notes he had a prostatectomy few days ago and has had a Ruiz in place. Notes he was taking a shower today when he got acutely dizzy. He did not fall but notes that his wife came to his side and lowered him to the ground. He notes he was weak but was able to get up by himself although his wife did let him. EMS was called and came to him. Upon lifting him his Ruiz got snagged on something and got pulled. Since then he has had some hematuria around the urethral meatus and through the Ruiz catheter as well. He notes he feels fine now and is asymptomatic. Denies any abdominal pain, chest pain, palpitations, dizziness, headache. Please See MDM for Additional Details of the HPI/PMH  Nursing Notes were all reviewed and agreed with or any disagreements were addressed in the HPI. REVIEW OF SYSTEMS        Positives and Pertinent negatives as per HPI.     PAST HISTORY     Past Medical History:  Past Medical History:   Diagnosis Date    Allergic rhinitis 1/30/14    treated at [facility]    Arthritis     HANDS    Bladder cancer ([address] [facility]) 06/02/2016    Dr. Davey Harvey 8/10/16 f/u note

## 2023-08-06 NOTE — CONSULTS
CC:  Hematuria    HPI:  Mr. Anne Prater is a 76yoM with hx of prostate cancer. He undewent at [facility] on Tuesday, 8/1/2023 at [facility]. This morning he fell in the shower and his catheter was tugged on when EMS was picking him up. He immediately began to bleed around ruiz and it stopped draining. Now complaining of bladder pain. He notes that he was sent out of [facility] without a catheter secure, and fears ruiz is malpositioned in light of lack of drainage.     Past Medical History:   Diagnosis Date    Allergic rhinitis 1/30/14    [facility]    Arthritis     HANDS    Bladder cancer ([address]) 06/02/2016    Dr. Merry Medina 8/10/16 f/u note path report too    Bladder cancer ([address]) 06/2016    Chronic kidney disease     Chronic kidney disease, stage II (mild)     Diverticula of colon 7/18/12    festus reza    Diverticulosis of colon     Elevated PSA 10/2011    Dr Merry Medina    Glaucoma     9/21/16 note from [facility]    Hypercholesterolemia     Other ill-defined conditions(799.89)     in clinical trial at [facility] for prostate cancer    Pneumonia     Prostate cancer ([address]) 11/2012      6/3/16 [facility] notes  [facility] 8/10/16 note    Prostatitis 07/2014    [facility]    Prostatitis     [facility]    Syncope 10/06/15    notes from [place] [facility] 3/09    and rosacea    Varicose veins     Vitamin D deficiency      Past Surgical History:   Procedure Laterality Date    APPENDECTOMY      COLONOSCOPY  8/06    5 yrs    CT CARDIAC CALCIUM SCORING  3/3/04    score 65.57    OTHER SURGICAL HISTORY  7/18/12    colonoscopy 5 year repeat    OTHER SURGICAL HISTORY  10/21/15    echocardiogram due to syncope    OTHER SURGICAL HISTORY  4/2012    cyst on groin removed Dr Yrn Smith    umbilical hernia    UROLOGICAL      TURB X 3    UROLOGICAL  '90    hydrocele    UROLOGICAL  05/2017    Biopsy and tumor removed from his bladder at [facility]     Review of Systems  Complete ROS performed and negative except as

## 2023-08-06 NOTE — ED NOTES
DC papers reviewed and in hand, pt verbalized understanding. Patient provided wheelchair out of ED, no acute distress noted.         Norma Rivera RN  08/06/23 [id number]

## 2023-08-07 LAB
EKG ATRIAL RATE: 80 BPM
EKG DIAGNOSIS: NORMAL
EKG P AXIS: 57 DEGREES
EKG P-R INTERVAL: 146 MS
EKG Q-T INTERVAL: 394 MS
EKG QRS DURATION: 80 MS
EKG QTC CALCULATION (BAZETT): 454 MS
EKG R AXIS: 14 DEGREES
EKG T AXIS: 44 DEGREES
EKG VENTRICULAR RATE: 80 BPM

## 2023-12-24 ENCOUNTER — HOSPITAL ENCOUNTER (OUTPATIENT)
Facility: HOSPITAL | Age: 78
Discharge: HOME | End: 2023-12-27
Payer: MEDICARE

## 2023-12-24 DIAGNOSIS — R47.1 DYSARTHRIA: ICD-10-CM

## 2023-12-24 PROCEDURE — A9579 GAD-BASE MR CONTRAST NOS,1ML: HCPCS | Performed by: FAMILY MEDICINE

## 2023-12-24 PROCEDURE — 70553 MRI BRAIN STEM W/O & W/DYE: CPT

## 2023-12-24 PROCEDURE — 6360000004 HC RX CONTRAST MEDICATION: Performed by: FAMILY MEDICINE

## 2023-12-24 RX ADMIN — GADOTERIDOL 15 ML: 279.3 INJECTION, SOLUTION INTRAVENOUS at 08:46

## (undated) DEVICE — SKIN MARKER,REGULAR TIP WITH RULER AND LABELS: Brand: DEVON

## (undated) DEVICE — SINGLE BASIN: Brand: CARDINAL HEALTH

## (undated) DEVICE — CYSTOSCOPY PACK: Brand: CONVERTORS

## (undated) DEVICE — GOWN,SIRUS,POLYRNF,BRTHSLV,XL,30/CS: Brand: MEDLINE

## (undated) DEVICE — (D)SYR 10ML 1/5ML GRAD NSAF -- PKGING CHANGE USE ITEM 338027

## (undated) DEVICE — MEDI-VAC NON-CONDUCTIVE SUCTION TUBING: Brand: CARDINAL HEALTH

## (undated) DEVICE — TIDISHIELD UROLOGY DRAIN BAGS FROSTY VINYL STERILE FITS SIEMENS UROSKOP ACCESS 20 PER CASE: Brand: TIDISHIELD

## (undated) DEVICE — SOLUTION IRRIG 1000ML H2O STRL BLT

## (undated) DEVICE — KENDALL SCD EXPRESS SLEEVES, KNEE LENGTH, MEDIUM: Brand: KENDALL SCD

## (undated) DEVICE — STERILE POLYISOPRENE POWDER-FREE SURGICAL GLOVES WITH EMOLLIENT COATING: Brand: PROTEXIS

## (undated) DEVICE — Z DISCONTINUEDSOLUTION PREP 2OZ 10% POVIDONE IOD SCR CAP BTL

## (undated) DEVICE — Z DISCONTINUED USE 2599823 ELECTRODE ENDO 27FR 0.3MM MPLR LOOP DISP

## (undated) DEVICE — INFECTION CONTROL KIT SYS

## (undated) DEVICE — SOL IRR GLYC 1.5 % 3000ML --

## (undated) DEVICE — 3M™ RANGER™ FLUID WARMING IRRIGATION SET, 24750, 10/CASE: Brand: 3M™ RANGER™

## (undated) DEVICE — REM POLYHESIVE ADULT PATIENT RETURN ELECTRODE: Brand: VALLEYLAB